# Patient Record
Sex: MALE | Race: WHITE | Employment: FULL TIME | URBAN - METROPOLITAN AREA
[De-identification: names, ages, dates, MRNs, and addresses within clinical notes are randomized per-mention and may not be internally consistent; named-entity substitution may affect disease eponyms.]

---

## 2011-02-25 LAB
ANTIBODY: NEGATIVE
HIV AG/AB: NEGATIVE

## 2017-11-17 ENCOUNTER — TELEPHONE (OUTPATIENT)
Dept: FAMILY MEDICINE CLINIC | Age: 52
End: 2017-11-17

## 2017-11-27 ENCOUNTER — TELEPHONE (OUTPATIENT)
Dept: FAMILY MEDICINE CLINIC | Age: 52
End: 2017-11-27

## 2017-11-27 DIAGNOSIS — Z12.5 SCREENING PSA (PROSTATE SPECIFIC ANTIGEN): ICD-10-CM

## 2017-11-27 DIAGNOSIS — Z13.220 SCREENING, LIPID: ICD-10-CM

## 2017-11-27 DIAGNOSIS — Z00.00 ANNUAL PHYSICAL EXAM: Primary | ICD-10-CM

## 2017-11-27 NOTE — TELEPHONE ENCOUNTER
PATIENT IS SCHEDULED TO ESTABLISH WITH DR Alfie Martinez ON 10/8/18, HE STATES HE JUST HAD A PHYSICAL WITH HER CURRENT PCP, BUT HE IS REQUESTING ORDERS FOR BLOOD WORK TO BE CREATED SO HE CAN HAVE IT DONE A WEEK BEFORE HIS APPT.  PLEASE ADVISE

## 2018-02-10 ENCOUNTER — OFFICE VISIT (OUTPATIENT)
Dept: FAMILY MEDICINE CLINIC | Age: 53
End: 2018-02-10
Payer: COMMERCIAL

## 2018-02-10 VITALS
TEMPERATURE: 98.1 F | RESPIRATION RATE: 14 BRPM | HEART RATE: 87 BPM | SYSTOLIC BLOOD PRESSURE: 152 MMHG | DIASTOLIC BLOOD PRESSURE: 96 MMHG | HEIGHT: 70 IN

## 2018-02-10 DIAGNOSIS — K64.9 HEMORRHOIDS, UNSPECIFIED HEMORRHOID TYPE: Primary | ICD-10-CM

## 2018-02-10 PROCEDURE — G8421 BMI NOT CALCULATED: HCPCS | Performed by: NURSE PRACTITIONER

## 2018-02-10 PROCEDURE — G8482 FLU IMMUNIZE ORDER/ADMIN: HCPCS | Performed by: NURSE PRACTITIONER

## 2018-02-10 PROCEDURE — 1036F TOBACCO NON-USER: CPT | Performed by: NURSE PRACTITIONER

## 2018-02-10 PROCEDURE — 3017F COLORECTAL CA SCREEN DOC REV: CPT | Performed by: NURSE PRACTITIONER

## 2018-02-10 PROCEDURE — 99213 OFFICE O/P EST LOW 20 MIN: CPT | Performed by: NURSE PRACTITIONER

## 2018-02-10 PROCEDURE — G8427 DOCREV CUR MEDS BY ELIG CLIN: HCPCS | Performed by: NURSE PRACTITIONER

## 2018-02-10 RX ORDER — HYDROCORTISONE ACETATE 25 MG/1
25 SUPPOSITORY RECTAL 2 TIMES DAILY
Qty: 100 SUPPOSITORY | Refills: 1 | Status: ON HOLD | OUTPATIENT
Start: 2018-02-10 | End: 2018-02-14 | Stop reason: HOSPADM

## 2018-02-10 RX ORDER — OMEPRAZOLE 40 MG/1
1 CAPSULE, DELAYED RELEASE ORAL
COMMUNITY
Start: 2017-07-17 | End: 2018-10-08 | Stop reason: ALTCHOICE

## 2018-02-10 ASSESSMENT — PATIENT HEALTH QUESTIONNAIRE - PHQ9
1. LITTLE INTEREST OR PLEASURE IN DOING THINGS: 0
2. FEELING DOWN, DEPRESSED OR HOPELESS: 0
SUM OF ALL RESPONSES TO PHQ9 QUESTIONS 1 & 2: 0
SUM OF ALL RESPONSES TO PHQ QUESTIONS 1-9: 0

## 2018-02-10 NOTE — PROGRESS NOTES
with the patient: current clinical status, medications, activities and diet. Side effects, adverse effects of the medication prescribed today, as well as treatment plan/ rationale and result expectations have been discussed with the patient who expresses understanding and desires to proceed. Close follow up to evaluate treatment results and for coordination of care. I have reviewed the patient's medical history in detail and updated the computerized patient record.     Amaya Lazo NP

## 2018-02-12 ENCOUNTER — OFFICE VISIT (OUTPATIENT)
Dept: FAMILY MEDICINE CLINIC | Age: 53
End: 2018-02-12
Payer: COMMERCIAL

## 2018-02-12 VITALS
HEART RATE: 82 BPM | HEIGHT: 70 IN | RESPIRATION RATE: 18 BRPM | SYSTOLIC BLOOD PRESSURE: 132 MMHG | DIASTOLIC BLOOD PRESSURE: 86 MMHG | TEMPERATURE: 97.2 F

## 2018-02-12 DIAGNOSIS — N50.89 PERINEAL MASS, MALE: Primary | ICD-10-CM

## 2018-02-12 PROCEDURE — G8421 BMI NOT CALCULATED: HCPCS | Performed by: NURSE PRACTITIONER

## 2018-02-12 PROCEDURE — G8427 DOCREV CUR MEDS BY ELIG CLIN: HCPCS | Performed by: NURSE PRACTITIONER

## 2018-02-12 PROCEDURE — 99213 OFFICE O/P EST LOW 20 MIN: CPT | Performed by: NURSE PRACTITIONER

## 2018-02-12 PROCEDURE — 3017F COLORECTAL CA SCREEN DOC REV: CPT | Performed by: NURSE PRACTITIONER

## 2018-02-12 PROCEDURE — 1036F TOBACCO NON-USER: CPT | Performed by: NURSE PRACTITIONER

## 2018-02-12 PROCEDURE — G8482 FLU IMMUNIZE ORDER/ADMIN: HCPCS | Performed by: NURSE PRACTITIONER

## 2018-02-12 ASSESSMENT — ENCOUNTER SYMPTOMS
ANAL BLEEDING: 0
DIARRHEA: 0
RECTAL PAIN: 1
NAUSEA: 0
VOMITING: 0
ABDOMINAL DISTENTION: 0
BLOOD IN STOOL: 0
CONSTIPATION: 0
ABDOMINAL PAIN: 0

## 2018-02-12 NOTE — PROGRESS NOTES
Subjective:      Patient ID: Umm Sargent is a 45 y.o. male who presents today for:  Chief Complaint   Patient presents with   [facility].     f/up on hemorroids, x5 days. denies any bleeding, has alot of pain. has tried medication OTC without relief       HPI    Patient seen for hemorrhoids 2 days ago. States he is in a lot of pain. Patient with h/o fissure that was repaired in 2012. Had colonoscopy shortly after this and states it showed nothing. Using suppositories and using otc rectal cream, tucks, etc. Using a donut to sit on. Denies any constipation or straining with BM's. Denies any penetration of anal cavity. Patient states that he feels like whatever it is causing the discomfort, it has worsened and feels like it is pressing on his tailbone. No past medical history on file. No past surgical history on file. No family history on file. Social History     Social History    Marital status: Single     Spouse name: N/A    Number of children: N/A    Years of education: N/A     Occupational History    Not on file. Social History Main Topics    Smoking status: Former Smoker    Smokeless tobacco: Never Used    Alcohol use Not on file    Drug use: Unknown    Sexual activity: Not on file     Other Topics Concern    Not on file     Social History Narrative    No narrative on file     Current Outpatient Prescriptions on File Prior to Visit   Medication Sig Dispense Refill    omeprazole (PRILOSEC) 40 MG delayed release capsule Take 1 capsule by mouth      hydrocortisone (ANUSOL-HC) 25 MG suppository Place 1 suppository rectally 2 times daily 100 suppository 1    Lidocaine, Anorectal, 5 % GEL Apply 4x a day as needed 30 g 1     No current facility-administered medications on file prior to visit. Allergies:  Amoxicillin and Clindamycin/lincomycin    Review of Systems   Gastrointestinal: Positive for rectal pain.  Negative for abdominal distention, abdominal pain, anal bleeding, blood in Return if symptoms worsen or fail to improve.     Estefanía Guevara, NP

## 2018-02-13 ENCOUNTER — ANESTHESIA EVENT (OUTPATIENT)
Dept: OPERATING ROOM | Age: 53
End: 2018-02-13
Payer: COMMERCIAL

## 2018-02-13 ENCOUNTER — HOSPITAL ENCOUNTER (OUTPATIENT)
Age: 53
Discharge: HOME | End: 2018-02-14
Attending: SURGERY | Admitting: SURGERY
Payer: COMMERCIAL

## 2018-02-13 ENCOUNTER — ANESTHESIA (OUTPATIENT)
Dept: OPERATING ROOM | Age: 53
End: 2018-02-13
Payer: COMMERCIAL

## 2018-02-13 ENCOUNTER — OFFICE VISIT (OUTPATIENT)
Dept: SURGERY | Age: 53
End: 2018-02-13
Payer: COMMERCIAL

## 2018-02-13 VITALS — DIASTOLIC BLOOD PRESSURE: 90 MMHG | TEMPERATURE: 97.9 F | SYSTOLIC BLOOD PRESSURE: 160 MMHG | HEART RATE: 100 BPM

## 2018-02-13 VITALS — DIASTOLIC BLOOD PRESSURE: 83 MMHG | SYSTOLIC BLOOD PRESSURE: 158 MMHG | OXYGEN SATURATION: 100 %

## 2018-02-13 DIAGNOSIS — K61.1 PERIRECTAL ABSCESS: Primary | ICD-10-CM

## 2018-02-13 PROBLEM — F41.1 GENERALIZED ANXIETY DISORDER: Status: ACTIVE | Noted: 2018-02-13

## 2018-02-13 PROBLEM — M27.59: Status: ACTIVE | Noted: 2018-02-13

## 2018-02-13 PROBLEM — K21.00 REFLUX ESOPHAGITIS: Status: ACTIVE | Noted: 2018-02-13

## 2018-02-13 PROBLEM — F42.9 OBSESSIVE-COMPULSIVE DISORDER: Status: ACTIVE | Noted: 2018-02-13

## 2018-02-13 PROCEDURE — 87205 SMEAR GRAM STAIN: CPT

## 2018-02-13 PROCEDURE — 2580000003 HC RX 258: Performed by: SURGERY

## 2018-02-13 PROCEDURE — 6370000000 HC RX 637 (ALT 250 FOR IP): Performed by: ANESTHESIOLOGY

## 2018-02-13 PROCEDURE — 3600000003 HC SURGERY LEVEL 3 BASE: Performed by: SURGERY

## 2018-02-13 PROCEDURE — 3700000000 HC ANESTHESIA ATTENDED CARE: Performed by: SURGERY

## 2018-02-13 PROCEDURE — 2580000003 HC RX 258: Performed by: ANESTHESIOLOGY

## 2018-02-13 PROCEDURE — 6360000002 HC RX W HCPCS: Performed by: ANESTHESIOLOGY

## 2018-02-13 PROCEDURE — G8482 FLU IMMUNIZE ORDER/ADMIN: HCPCS | Performed by: SURGERY

## 2018-02-13 PROCEDURE — 7100000000 HC PACU RECOVERY - FIRST 15 MIN: Performed by: SURGERY

## 2018-02-13 PROCEDURE — 6360000002 HC RX W HCPCS: Performed by: NURSE ANESTHETIST, CERTIFIED REGISTERED

## 2018-02-13 PROCEDURE — 7100000001 HC PACU RECOVERY - ADDTL 15 MIN: Performed by: SURGERY

## 2018-02-13 PROCEDURE — 46040 I&D ISCHIORCT&/PERIRCT ABSC: CPT | Performed by: SURGERY

## 2018-02-13 PROCEDURE — 1036F TOBACCO NON-USER: CPT | Performed by: SURGERY

## 2018-02-13 PROCEDURE — 2500000003 HC RX 250 WO HCPCS: Performed by: ANESTHESIOLOGY

## 2018-02-13 PROCEDURE — 6370000000 HC RX 637 (ALT 250 FOR IP): Performed by: SURGERY

## 2018-02-13 PROCEDURE — 6360000002 HC RX W HCPCS: Performed by: SURGERY

## 2018-02-13 PROCEDURE — G8421 BMI NOT CALCULATED: HCPCS | Performed by: SURGERY

## 2018-02-13 PROCEDURE — G8428 CUR MEDS NOT DOCUMENT: HCPCS | Performed by: SURGERY

## 2018-02-13 PROCEDURE — 87077 CULTURE AEROBIC IDENTIFY: CPT

## 2018-02-13 PROCEDURE — 87075 CULTR BACTERIA EXCEPT BLOOD: CPT

## 2018-02-13 PROCEDURE — 99204 OFFICE O/P NEW MOD 45 MIN: CPT | Performed by: SURGERY

## 2018-02-13 PROCEDURE — 87186 SC STD MICRODIL/AGAR DIL: CPT

## 2018-02-13 PROCEDURE — 2500000003 HC RX 250 WO HCPCS: Performed by: SURGERY

## 2018-02-13 PROCEDURE — 3700000001 HC ADD 15 MINUTES (ANESTHESIA): Performed by: SURGERY

## 2018-02-13 PROCEDURE — 3600000013 HC SURGERY LEVEL 3 ADDTL 15MIN: Performed by: SURGERY

## 2018-02-13 PROCEDURE — 3017F COLORECTAL CA SCREEN DOC REV: CPT | Performed by: SURGERY

## 2018-02-13 PROCEDURE — 2500000003 HC RX 250 WO HCPCS: Performed by: NURSE ANESTHETIST, CERTIFIED REGISTERED

## 2018-02-13 PROCEDURE — 87070 CULTURE OTHR SPECIMN AEROBIC: CPT

## 2018-02-13 RX ORDER — HYDROCODONE BITARTRATE AND ACETAMINOPHEN 5; 325 MG/1; MG/1
2 TABLET ORAL EVERY 4 HOURS PRN
Status: DISCONTINUED | OUTPATIENT
Start: 2018-02-13 | End: 2018-02-13

## 2018-02-13 RX ORDER — FENTANYL CITRATE 50 UG/ML
INJECTION, SOLUTION INTRAMUSCULAR; INTRAVENOUS PRN
Status: DISCONTINUED | OUTPATIENT
Start: 2018-02-13 | End: 2018-02-13 | Stop reason: SDUPTHER

## 2018-02-13 RX ORDER — ACETAMINOPHEN 325 MG/1
650 TABLET ORAL EVERY 6 HOURS PRN
COMMUNITY

## 2018-02-13 RX ORDER — SULFAMETHOXAZOLE AND TRIMETHOPRIM 800; 160 MG/1; MG/1
1 TABLET ORAL
COMMUNITY
End: 2018-10-08 | Stop reason: ALTCHOICE

## 2018-02-13 RX ORDER — HYDROCODONE BITARTRATE AND ACETAMINOPHEN 5; 325 MG/1; MG/1
1 TABLET ORAL EVERY 4 HOURS PRN
Status: DISCONTINUED | OUTPATIENT
Start: 2018-02-13 | End: 2018-02-13

## 2018-02-13 RX ORDER — M-VIT,TX,IRON,MINS/CALC/FOLIC 27MG-0.4MG
1 TABLET ORAL DAILY
COMMUNITY

## 2018-02-13 RX ORDER — ACETAMINOPHEN AND CODEINE PHOSPHATE 300; 30 MG/1; MG/1
2 TABLET ORAL EVERY 4 HOURS PRN
Status: DISCONTINUED | OUTPATIENT
Start: 2018-02-13 | End: 2018-02-14 | Stop reason: HOSPADM

## 2018-02-13 RX ORDER — MEPERIDINE HYDROCHLORIDE 25 MG/ML
12.5 INJECTION INTRAMUSCULAR; INTRAVENOUS; SUBCUTANEOUS EVERY 5 MIN PRN
Status: DISCONTINUED | OUTPATIENT
Start: 2018-02-13 | End: 2018-02-13 | Stop reason: HOSPADM

## 2018-02-13 RX ORDER — ONDANSETRON 2 MG/ML
4 INJECTION INTRAMUSCULAR; INTRAVENOUS
Status: DISCONTINUED | OUTPATIENT
Start: 2018-02-13 | End: 2018-02-13 | Stop reason: HOSPADM

## 2018-02-13 RX ORDER — DIPHENHYDRAMINE HYDROCHLORIDE 50 MG/ML
12.5 INJECTION INTRAMUSCULAR; INTRAVENOUS
Status: COMPLETED | OUTPATIENT
Start: 2018-02-13 | End: 2018-02-13

## 2018-02-13 RX ORDER — MAGNESIUM HYDROXIDE 1200 MG/15ML
LIQUID ORAL CONTINUOUS PRN
Status: DISCONTINUED | OUTPATIENT
Start: 2018-02-13 | End: 2018-02-13 | Stop reason: HOSPADM

## 2018-02-13 RX ORDER — HYDROCODONE BITARTRATE AND ACETAMINOPHEN 5; 325 MG/1; MG/1
2 TABLET ORAL PRN
Status: DISCONTINUED | OUTPATIENT
Start: 2018-02-13 | End: 2018-02-13 | Stop reason: HOSPADM

## 2018-02-13 RX ORDER — LIDOCAINE HYDROCHLORIDE 10 MG/ML
1 INJECTION, SOLUTION EPIDURAL; INFILTRATION; INTRACAUDAL; PERINEURAL
Status: COMPLETED | OUTPATIENT
Start: 2018-02-13 | End: 2018-02-13

## 2018-02-13 RX ORDER — LIDOCAINE HYDROCHLORIDE 20 MG/ML
INJECTION, SOLUTION INFILTRATION; PERINEURAL PRN
Status: DISCONTINUED | OUTPATIENT
Start: 2018-02-13 | End: 2018-02-13 | Stop reason: SDUPTHER

## 2018-02-13 RX ORDER — ONDANSETRON 2 MG/ML
4 INJECTION INTRAMUSCULAR; INTRAVENOUS EVERY 6 HOURS PRN
Status: DISCONTINUED | OUTPATIENT
Start: 2018-02-13 | End: 2018-02-14 | Stop reason: HOSPADM

## 2018-02-13 RX ORDER — METRONIDAZOLE 500 MG/1
500 TABLET ORAL 4 TIMES DAILY
COMMUNITY
End: 2018-10-08 | Stop reason: ALTCHOICE

## 2018-02-13 RX ORDER — BUPIVACAINE HYDROCHLORIDE 2.5 MG/ML
INJECTION, SOLUTION EPIDURAL; INFILTRATION; INTRACAUDAL PRN
Status: DISCONTINUED | OUTPATIENT
Start: 2018-02-13 | End: 2018-02-13 | Stop reason: HOSPADM

## 2018-02-13 RX ORDER — DOCUSATE SODIUM 100 MG/1
100 CAPSULE, LIQUID FILLED ORAL 2 TIMES DAILY
Status: ON HOLD | COMMUNITY
End: 2018-02-14 | Stop reason: HOSPADM

## 2018-02-13 RX ORDER — ACETAMINOPHEN AND CODEINE PHOSPHATE 300; 30 MG/1; MG/1
2 TABLET ORAL EVERY 4 HOURS PRN
Status: DISCONTINUED | OUTPATIENT
Start: 2018-02-13 | End: 2018-02-13

## 2018-02-13 RX ORDER — AMOXICILLIN 250 MG
2 CAPSULE ORAL DAILY
Status: ON HOLD | COMMUNITY
End: 2018-02-14 | Stop reason: HOSPADM

## 2018-02-13 RX ORDER — PSEUDOEPHEDRINE HCL 120 MG/1
120 TABLET, FILM COATED, EXTENDED RELEASE ORAL EVERY 12 HOURS PRN
Status: ON HOLD | COMMUNITY
End: 2018-02-14 | Stop reason: HOSPADM

## 2018-02-13 RX ORDER — HEPARIN SODIUM 5000 [USP'U]/.5ML
5000 INJECTION, SOLUTION INTRAVENOUS; SUBCUTANEOUS EVERY 8 HOURS
Status: DISCONTINUED | OUTPATIENT
Start: 2018-02-13 | End: 2018-02-14 | Stop reason: HOSPADM

## 2018-02-13 RX ORDER — ACETAMINOPHEN AND CODEINE PHOSPHATE 300; 30 MG/1; MG/1
1 TABLET ORAL EVERY 4 HOURS PRN
Status: DISCONTINUED | OUTPATIENT
Start: 2018-02-13 | End: 2018-02-13

## 2018-02-13 RX ORDER — SODIUM CHLORIDE 0.9 % (FLUSH) 0.9 %
10 SYRINGE (ML) INJECTION EVERY 12 HOURS SCHEDULED
Status: DISCONTINUED | OUTPATIENT
Start: 2018-02-13 | End: 2018-02-13 | Stop reason: HOSPADM

## 2018-02-13 RX ORDER — OMEGA-3 FATTY ACIDS CAP DELAYED RELEASE 1000 MG 1000 MG
1000 CAPSULE DELAYED RELEASE ORAL DAILY
COMMUNITY

## 2018-02-13 RX ORDER — KETOROLAC TROMETHAMINE 30 MG/ML
30 INJECTION, SOLUTION INTRAMUSCULAR; INTRAVENOUS EVERY 6 HOURS
Status: DISCONTINUED | OUTPATIENT
Start: 2018-02-13 | End: 2018-02-14 | Stop reason: HOSPADM

## 2018-02-13 RX ORDER — MORPHINE SULFATE 4 MG/ML
4 INJECTION, SOLUTION INTRAMUSCULAR; INTRAVENOUS
Status: DISCONTINUED | OUTPATIENT
Start: 2018-02-13 | End: 2018-02-14 | Stop reason: HOSPADM

## 2018-02-13 RX ORDER — METRONIDAZOLE 500 MG/1
250 TABLET ORAL EVERY 8 HOURS SCHEDULED
Status: DISCONTINUED | OUTPATIENT
Start: 2018-02-13 | End: 2018-02-14 | Stop reason: HOSPADM

## 2018-02-13 RX ORDER — PROPOFOL 10 MG/ML
INJECTION, EMULSION INTRAVENOUS PRN
Status: DISCONTINUED | OUTPATIENT
Start: 2018-02-13 | End: 2018-02-13 | Stop reason: SDUPTHER

## 2018-02-13 RX ORDER — HYDROCODONE BITARTRATE AND ACETAMINOPHEN 5; 325 MG/1; MG/1
1 TABLET ORAL PRN
Status: DISCONTINUED | OUTPATIENT
Start: 2018-02-13 | End: 2018-02-13 | Stop reason: HOSPADM

## 2018-02-13 RX ORDER — SODIUM CHLORIDE 0.9 % (FLUSH) 0.9 %
10 SYRINGE (ML) INJECTION PRN
Status: DISCONTINUED | OUTPATIENT
Start: 2018-02-13 | End: 2018-02-13 | Stop reason: HOSPADM

## 2018-02-13 RX ORDER — SCOLOPAMINE TRANSDERMAL SYSTEM 1 MG/1
1 PATCH, EXTENDED RELEASE TRANSDERMAL ONCE
Status: DISCONTINUED | OUTPATIENT
Start: 2018-02-13 | End: 2018-02-14 | Stop reason: HOSPADM

## 2018-02-13 RX ORDER — ACETAMINOPHEN AND CODEINE PHOSPHATE 300; 30 MG/1; MG/1
1 TABLET ORAL EVERY 4 HOURS PRN
Status: DISCONTINUED | OUTPATIENT
Start: 2018-02-13 | End: 2018-02-14 | Stop reason: HOSPADM

## 2018-02-13 RX ORDER — DEXTROSE, SODIUM CHLORIDE, SODIUM LACTATE, POTASSIUM CHLORIDE, AND CALCIUM CHLORIDE 5; .6; .31; .03; .02 G/100ML; G/100ML; G/100ML; G/100ML; G/100ML
INJECTION, SOLUTION INTRAVENOUS CONTINUOUS
Status: DISCONTINUED | OUTPATIENT
Start: 2018-02-13 | End: 2018-02-14 | Stop reason: HOSPADM

## 2018-02-13 RX ORDER — METOCLOPRAMIDE HYDROCHLORIDE 5 MG/ML
10 INJECTION INTRAMUSCULAR; INTRAVENOUS
Status: DISCONTINUED | OUTPATIENT
Start: 2018-02-13 | End: 2018-02-13 | Stop reason: HOSPADM

## 2018-02-13 RX ORDER — SODIUM CHLORIDE, SODIUM LACTATE, POTASSIUM CHLORIDE, CALCIUM CHLORIDE 600; 310; 30; 20 MG/100ML; MG/100ML; MG/100ML; MG/100ML
INJECTION, SOLUTION INTRAVENOUS CONTINUOUS
Status: DISCONTINUED | OUTPATIENT
Start: 2018-02-13 | End: 2018-02-14 | Stop reason: HOSPADM

## 2018-02-13 RX ORDER — MIDAZOLAM HYDROCHLORIDE 1 MG/ML
INJECTION INTRAMUSCULAR; INTRAVENOUS PRN
Status: DISCONTINUED | OUTPATIENT
Start: 2018-02-13 | End: 2018-02-13 | Stop reason: SDUPTHER

## 2018-02-13 RX ORDER — MORPHINE SULFATE 2 MG/ML
2 INJECTION, SOLUTION INTRAMUSCULAR; INTRAVENOUS
Status: DISCONTINUED | OUTPATIENT
Start: 2018-02-13 | End: 2018-02-14 | Stop reason: HOSPADM

## 2018-02-13 RX ORDER — FENTANYL CITRATE 50 UG/ML
50 INJECTION, SOLUTION INTRAMUSCULAR; INTRAVENOUS EVERY 10 MIN PRN
Status: DISCONTINUED | OUTPATIENT
Start: 2018-02-13 | End: 2018-02-13 | Stop reason: HOSPADM

## 2018-02-13 RX ADMIN — SODIUM CHLORIDE, SODIUM LACTATE, POTASSIUM CHLORIDE, CALCIUM CHLORIDE AND DEXTROSE MONOHYDRATE: 5; 600; 310; 30; 20 INJECTION, SOLUTION INTRAVENOUS at 22:31

## 2018-02-13 RX ADMIN — Medication 0.5 MG: at 18:00

## 2018-02-13 RX ADMIN — LIDOCAINE HYDROCHLORIDE 0.1 ML: 10 INJECTION, SOLUTION EPIDURAL; INFILTRATION; INTRACAUDAL; PERINEURAL at 15:44

## 2018-02-13 RX ADMIN — SODIUM CHLORIDE, POTASSIUM CHLORIDE, SODIUM LACTATE AND CALCIUM CHLORIDE: 600; 310; 30; 20 INJECTION, SOLUTION INTRAVENOUS at 17:05

## 2018-02-13 RX ADMIN — FENTANYL CITRATE 25 MCG: 50 INJECTION, SOLUTION INTRAMUSCULAR; INTRAVENOUS at 17:01

## 2018-02-13 RX ADMIN — PROPOFOL 50 MG: 10 INJECTION, EMULSION INTRAVENOUS at 16:59

## 2018-02-13 RX ADMIN — Medication 0.5 MG: at 18:10

## 2018-02-13 RX ADMIN — Medication 0.5 MG: at 18:20

## 2018-02-13 RX ADMIN — FENTANYL CITRATE 25 MCG: 50 INJECTION, SOLUTION INTRAMUSCULAR; INTRAVENOUS at 16:52

## 2018-02-13 RX ADMIN — FENTANYL CITRATE 25 MCG: 50 INJECTION, SOLUTION INTRAMUSCULAR; INTRAVENOUS at 16:50

## 2018-02-13 RX ADMIN — MIDAZOLAM HYDROCHLORIDE 2 MG: 1 INJECTION, SOLUTION INTRAMUSCULAR; INTRAVENOUS at 16:39

## 2018-02-13 RX ADMIN — FENTANYL CITRATE 50 MCG: 50 INJECTION, SOLUTION INTRAMUSCULAR; INTRAVENOUS at 17:40

## 2018-02-13 RX ADMIN — LIDOCAINE HYDROCHLORIDE 100 MG: 20 INJECTION, SOLUTION INFILTRATION; PERINEURAL at 16:46

## 2018-02-13 RX ADMIN — KETOROLAC TROMETHAMINE 30 MG: 30 INJECTION, SOLUTION INTRAMUSCULAR at 22:31

## 2018-02-13 RX ADMIN — Medication 0.5 MG: at 18:34

## 2018-02-13 RX ADMIN — METRONIDAZOLE 250 MG: 500 TABLET ORAL at 22:31

## 2018-02-13 RX ADMIN — SODIUM CHLORIDE, POTASSIUM CHLORIDE, SODIUM LACTATE AND CALCIUM CHLORIDE: 600; 310; 30; 20 INJECTION, SOLUTION INTRAVENOUS at 15:45

## 2018-02-13 RX ADMIN — DIPHENHYDRAMINE HYDROCHLORIDE 12.5 MG: 50 INJECTION, SOLUTION INTRAMUSCULAR; INTRAVENOUS at 18:39

## 2018-02-13 RX ADMIN — FENTANYL CITRATE 25 MCG: 50 INJECTION, SOLUTION INTRAMUSCULAR; INTRAVENOUS at 16:48

## 2018-02-13 RX ADMIN — FENTANYL CITRATE 50 MCG: 50 INJECTION, SOLUTION INTRAMUSCULAR; INTRAVENOUS at 17:51

## 2018-02-13 RX ADMIN — PROPOFOL 200 MG: 10 INJECTION, EMULSION INTRAVENOUS at 16:46

## 2018-02-13 RX ADMIN — FENTANYL CITRATE 25 MCG: 50 INJECTION, SOLUTION INTRAMUSCULAR; INTRAVENOUS at 16:54

## 2018-02-13 ASSESSMENT — PULMONARY FUNCTION TESTS
PIF_VALUE: 15
PIF_VALUE: 17
PIF_VALUE: 3
PIF_VALUE: 13
PIF_VALUE: 17
PIF_VALUE: 3
PIF_VALUE: 17
PIF_VALUE: 15
PIF_VALUE: 17
PIF_VALUE: 19
PIF_VALUE: 2
PIF_VALUE: 17
PIF_VALUE: 15
PIF_VALUE: 21
PIF_VALUE: 0
PIF_VALUE: 2
PIF_VALUE: 17
PIF_VALUE: 18
PIF_VALUE: 17
PIF_VALUE: 13
PIF_VALUE: 3
PIF_VALUE: 16
PIF_VALUE: 13
PIF_VALUE: 17
PIF_VALUE: 0
PIF_VALUE: 15
PIF_VALUE: 3
PIF_VALUE: 2

## 2018-02-13 ASSESSMENT — ENCOUNTER SYMPTOMS
ABDOMINAL DISTENTION: 0
ANAL BLEEDING: 0
ABDOMINAL PAIN: 0
TROUBLE SWALLOWING: 0
CHOKING: 0
EYE DISCHARGE: 0
EYE PAIN: 0
SHORTNESS OF BREATH: 0
WHEEZING: 0
BACK PAIN: 0
VOMITING: 0
COLOR CHANGE: 0
CHEST TIGHTNESS: 0

## 2018-02-13 ASSESSMENT — PAIN SCALES - GENERAL
PAINLEVEL_OUTOF10: 6
PAINLEVEL_OUTOF10: 5
PAINLEVEL_OUTOF10: 10
PAINLEVEL_OUTOF10: 9
PAINLEVEL_OUTOF10: 7
PAINLEVEL_OUTOF10: 8
PAINLEVEL_OUTOF10: 8

## 2018-02-13 ASSESSMENT — PAIN DESCRIPTION - PAIN TYPE: TYPE: SURGICAL PAIN

## 2018-02-13 ASSESSMENT — PAIN DESCRIPTION - LOCATION: LOCATION: BUTTOCKS

## 2018-02-13 ASSESSMENT — PAIN - FUNCTIONAL ASSESSMENT: PAIN_FUNCTIONAL_ASSESSMENT: 0-10

## 2018-02-13 NOTE — BRIEF OP NOTE
Brief Postoperative Note  ______________________________________________________________    Patient: Kip Goodpasture  YOB: 1965  MRN: [id number]  Date of Procedure: 2/13/2018    Pre-Op Diagnosis: PERIRECTAL ABSCESS    Post-Op Diagnosis: Same       Procedure(s):  INCISION AND DRAINAGE PERIRECTAL RECTAL ABSCESS (PAT ON ADMIT)    Anesthesia: General    Surgeon(s):  Radha Copeland MD    Staff:  First Assistant: Joe Kendall  Scrub Person First: Felice Goodpasture     Estimated Blood Loss: * No values recorded between 2/13/2018  4:40 PM and 2/13/2018  7:65 PM *     Complications: None    Specimens:   ID Type Source Tests Collected by Time Destination   1 :  Swab Perineum SURGICAL CULTURE Radha Copeland MD 2/13/2018 1703        Implants:  * No implants in log *      Drains:      Findings: abscess    Radha Copeland MD  Date: 2/13/2018  Time: 5:06 PM

## 2018-02-13 NOTE — ANESTHESIA PRE PROCEDURE
Department of Anesthesiology  Preprocedure Note       Name:  Jaime Garcia   Age:  45 y.o.  :  1965                                          MRN:  [id number]         Date:  2018      Surgeon: Flaca Echols):  Alfred Vickers MD    Procedure: Procedure(s):  INCISION AND DRAINAGE PERIRECTAL RECTAL ABSCESS (PAT ON ADMIT)    Medications prior to admission:   Prior to Admission medications    Medication Sig Start Date End Date Taking? Authorizing Provider   omeprazole (PRILOSEC) 40 MG delayed release capsule Take 1 capsule by mouth 17   Historical Provider, MD   hydrocortisone (ANUSOL-HC) 25 MG suppository Place 1 suppository rectally 2 times daily 2/10/18   [facility] Z[facility] Rivers   Lidocaine, Anorectal, 5 % GEL Apply 4x a day as needed 2/10/18   Gloria House NP       Current medications:    No current facility-administered medications for this encounter. 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 Past Surgical History:  No past surgical history on file. Social History:    Social History   Substance Use Topics    Smoking status: Former Smoker    Smokeless tobacco: Never Used    Alcohol use Not on file                                Counseling given: Not Answered      Vital Signs (Current): There were no vitals filed for this visit.                                            BP Readings from Last 3 Encounters:   18 (!) 160/90   18 132/86   02/10/18 (!) 152/96       NPO Status:                                                                                 BMI:   Wt Readings from Last 3 Encounters:   No data found for Wt     There is no height or weight on file to calculate BMI.    CBC: No results found for: WBC, RBC, HGB, HCT, MCV, RDW, PLT    CMP: No results found for: NA, K, CL, CO2, BUN, CREATININE, GFRAA, AGRATIO, LABGLOM, GLUCOSE, PROT, CALCIUM, BILITOT, ALKPHOS, AST, ALT    POC Tests: No results for input(s): POCGLU, POCNA, POCK, POCCL, POCBUN, POCHEMO, POCHCT in the last 72 hours. Coags: No results found for: PROTIME, INR, APTT    HCG (If Applicable): No results found for: PREGTESTUR, PREGSERUM, HCG, HCGQUANT     ABGs: No results found for: PHART, PO2ART, FZV2TIK, OHT4FQU, BEART, X5WKKWJJ     Type & Screen (If Applicable):  No results found for: LABABO, LABRH    Anesthesia Evaluation     history of anesthetic complications: PONV. Airway: Mallampati: I  TM distance: >3 FB   Neck ROM: full  Mouth opening: > = 3 FB Dental:          Pulmonary: breath sounds clear to auscultation  (+) asthma:                           ROS comment: Former smoker   Cardiovascular:Negative CV ROS            Rhythm: regular                      Neuro/Psych:   Negative Neuro/Psych ROS              GI/Hepatic/Renal:   (+) GERD:,           Endo/Other: Negative Endo/Other ROS                    Abdominal:           Vascular: negative vascular ROS. Anesthesia Plan      general     ASA 2     (PONV prophylaxis  Scop patch)  Induction: intravenous. MIPS: Postoperative opioids intended and Prophylactic antiemetics administered. Anesthetic plan and risks discussed with patient. Use of blood products discussed with patient whom consented to blood products.    Plan discussed with CRNA and surgical team.                  Екатерина Ritter MD   2/13/2018

## 2018-02-13 NOTE — PROGRESS NOTES
Kip Goodpasture      I have reviewed the Medical Assistant's entries in the Past Medical History, Medications, Allergies, Social History and Vital Sign sections      No chief complaint on file. HPI      45 M seen from ER for abscess and perirectal pain      Pt notes 8-10 days pain of anus  Notes lump at back of anus  Lump hurts    Not draining    Getting fever          Had anal surgery for fissure or fistula 2012 at [facility]  Colonoscopy at that time neg                              No past medical history on file. No past surgical history on file. Current problems include  There is no problem list on file for this patient. Social History     Social History    Marital status: Single     Spouse name: N/A    Number of children: N/A    Years of education: N/A     Social History Main Topics    Smoking status: Former Smoker    Smokeless tobacco: Never Used    Alcohol use None    Drug use: Unknown    Sexual activity: Not Asked     Other Topics Concern    None     Social History Narrative    None                 No family history on file. Current Outpatient Prescriptions   Medication Sig Dispense Refill    omeprazole (PRILOSEC) 40 MG delayed release capsule Take 1 capsule by mouth      hydrocortisone (ANUSOL-HC) 25 MG suppository Place 1 suppository rectally 2 times daily 100 suppository 1    Lidocaine, Anorectal, 5 % GEL Apply 4x a day as needed 30 g 1     No current facility-administered medications for this visit. Allergies   Allergen Reactions    Amoxicillin     Clindamycin/Lincomycin          Review of Systems   Constitutional: Negative for chills, fatigue, fever and unexpected weight change. HENT: Negative for nosebleeds and trouble swallowing. Eyes: Negative for pain and discharge. Respiratory: Negative for choking, chest tightness, shortness of breath and wheezing. Cardiovascular: Negative for chest pain, palpitations and leg swelling.    Gastrointestinal: Negative for abdominal distention, abdominal pain, anal bleeding and vomiting. Genitourinary: Negative for difficulty urinating, dysuria, flank pain and urgency. Musculoskeletal: Negative for arthralgias, back pain and neck stiffness. Skin: Negative for color change and wound. Neurological: Negative for dizziness, tremors, seizures, syncope and headaches. Hematological: Negative for adenopathy. Does not bruise/bleed easily. Psychiatric/Behavioral: Negative for agitation, behavioral problems and confusion. The patient is not nervous/anxious. Vitals:    02/13/18 0906   BP: (!) 160/90   Pulse: 100   Temp: 97.9 °F (36.6 °C)           Physical Exam   Constitutional: He is oriented to person, place, and time. He appears well-developed and well-nourished. HENT:   Head: Normocephalic and atraumatic. Eyes: Conjunctivae are normal. Pupils are equal, round, and reactive to light. Neck: Normal range of motion. Cardiovascular: Normal rate and normal heart sounds. Pulmonary/Chest: Effort normal. No stridor. He has no wheezes. He has no rales. Abdominal: Soft. Bowel sounds are normal. He exhibits no mass. There is no tenderness. There is no rebound and no guarding. Genitourinary: Right testis shows no mass. Left testis shows no mass. Genitourinary Comments: + abscess posterior to anus    Scar seen posteriorly   Musculoskeletal: Normal range of motion. Lymphadenopathy:     He has no cervical adenopathy. Neurological: He is alert and oriented to person, place, and time. Skin: Skin is warm and dry. Psychiatric: He has a normal mood and affect.  His behavior is normal.               Assessment/      Perirectal abscess            Plan/    Admit  NPO  IV ABX    To OR later today for I&D    Will have open wound    At risk for fistula            Fred Shirley MD

## 2018-02-14 VITALS
RESPIRATION RATE: 18 BRPM | DIASTOLIC BLOOD PRESSURE: 95 MMHG | WEIGHT: 195 LBS | HEART RATE: 77 BPM | TEMPERATURE: 97.5 F | SYSTOLIC BLOOD PRESSURE: 146 MMHG | HEIGHT: 70 IN | OXYGEN SATURATION: 96 % | BODY MASS INDEX: 27.92 KG/M2

## 2018-02-14 LAB
HCT VFR BLD CALC: 40 % (ref 42–52)
HEMOGLOBIN: 13.9 G/DL (ref 14–18)
MCH RBC QN AUTO: 28.4 PG (ref 27–31.3)
MCHC RBC AUTO-ENTMCNC: 34.7 % (ref 33–37)
MCV RBC AUTO: 82.1 FL (ref 80–100)
PDW BLD-RTO: 13.6 % (ref 11.5–14.5)
PLATELET # BLD: 210 K/UL (ref 130–400)
RBC # BLD: 4.87 M/UL (ref 4.7–6.1)
WBC # BLD: 6.2 K/UL (ref 4.8–10.8)

## 2018-02-14 PROCEDURE — 36415 COLL VENOUS BLD VENIPUNCTURE: CPT

## 2018-02-14 PROCEDURE — 2500000003 HC RX 250 WO HCPCS: Performed by: SURGERY

## 2018-02-14 PROCEDURE — 6370000000 HC RX 637 (ALT 250 FOR IP): Performed by: SURGERY

## 2018-02-14 PROCEDURE — 85027 COMPLETE CBC AUTOMATED: CPT

## 2018-02-14 PROCEDURE — 6360000002 HC RX W HCPCS: Performed by: SURGERY

## 2018-02-14 PROCEDURE — 2580000003 HC RX 258: Performed by: SURGERY

## 2018-02-14 PROCEDURE — 99024 POSTOP FOLLOW-UP VISIT: CPT | Performed by: SURGERY

## 2018-02-14 RX ADMIN — AZTREONAM 2 G: 2 INJECTION, SOLUTION INTRAVENOUS at 00:34

## 2018-02-14 RX ADMIN — METRONIDAZOLE 250 MG: 500 TABLET ORAL at 06:54

## 2018-02-14 RX ADMIN — AZTREONAM 2 G: 2 INJECTION, SOLUTION INTRAVENOUS at 08:16

## 2018-02-14 RX ADMIN — MORPHINE SULFATE 4 MG: 4 INJECTION, SOLUTION INTRAMUSCULAR; INTRAVENOUS at 09:14

## 2018-02-14 RX ADMIN — SODIUM CHLORIDE, SODIUM LACTATE, POTASSIUM CHLORIDE, CALCIUM CHLORIDE AND DEXTROSE MONOHYDRATE: 5; 600; 310; 30; 20 INJECTION, SOLUTION INTRAVENOUS at 08:16

## 2018-02-14 RX ADMIN — KETOROLAC TROMETHAMINE 30 MG: 30 INJECTION, SOLUTION INTRAMUSCULAR at 03:35

## 2018-02-14 ASSESSMENT — PAIN SCALES - GENERAL
PAINLEVEL_OUTOF10: 9
PAINLEVEL_OUTOF10: 1
PAINLEVEL_OUTOF10: 9

## 2018-02-14 NOTE — PROGRESS NOTES
Notified Dr. Garvey Notice via perfect serve need to review order for lovenox, and lab work. 2206 Notified Dr. Garvey Notice that patient needs to have a PLT count in order to admin heparin. Ordered am CBC. Will hold until am lab work. Pt assessment completed. Alert and oriented to person, place, time, and situation. Lung sounds clear bilaterally. RA. S1, S2 audible, no adventitious sounds. Peripheral pulses palpable. Bowel sounds active x 4 quadrants. Soft, non-tender. Skin Intact. Patient denies dyspnea, SOB, and any pain at this time. C/o tenderness to rectum. He has a bulky dressing with packing, abd pad and mesh underwear. Large drainage noted. Mesh underwear changed, applied large pad over packing. Will continue to monitor drainage.

## 2018-02-14 NOTE — OP NOTE
[address]                       [address], [address] [facility]                                 OPERATIVE REPORT    PATIENT NAME: Lucio Gray                    :        1965  MED REC NO:   [id number]                            ROOM:       [id number]  ACCOUNT NO:   [de-identified]                           ADMIT DATE: 2018  PROVIDER:     Claudia Quinn MD    DATE OF PROCEDURE:  2018    PREOPERATIVE DIAGNOSIS:  Perirectal abscess. POSTOPERATIVE DIAGNOSIS:  Perirectal abscess. OPERATION PERFORMED:  Incision and drainage of perirectal abscess. SURGEON:  Claudia Quinn MD    ASSISTANT:  Nurse. ANESTHESIA:  General.    INDICATIONS:  The patient is a 26-year-old male with a perirectal abscess. He presents now for incision and drainage. Risks, benefits, and  indications for this were reviewed with him. High likelihood of an open  wound was discussed. Need for packing, antibiotics etc., was reviewed. Potential need for colonoscopy at a later date was discussed. All  questions were answered and consent was obtained. OPERATIVE PROCEDURE:  The patient was taken to the operating room. General  anesthesia was obtained. He was placed on the table in right lateral  decubitus position. Perirectal area was now prepped with Betadine and  draped in a sterile fashion. Time-out procedures were followed. Area of concern was identified in the posterior midline. There appeared to  be an old scar at the site of prior incision and drainage procedure. Again, the area of fluctuance was centered underneath the scar. Using 15-blade, the scar was opened and abscess cavity was entered. Free  flowing white purulent fluid was evacuated, cultures were taken. The wound was irrigated and all the purulence was removed. It was now  packed with saline soaked gauze. Sterile dressing was placed.     The patient tolerated the procedure well.        Scot Flores MD    D: 02/13/2018 17:21:43       T: 02/14/2018 3:57:48     [id number]  Job#: [id number]     Doc#: [id number]    CC:  Vivienne Rodriguez MD

## 2018-02-16 LAB
ANAEROBIC CULTURE: ABNORMAL
CULTURE SURGICAL: ABNORMAL
CULTURE SURGICAL: ABNORMAL
GRAM STAIN RESULT: ABNORMAL
ORGANISM: ABNORMAL
ORGANISM: ABNORMAL

## 2018-02-16 NOTE — H&P
be kept n.p.o., will receive  fluids, analgesia, and antibiotics, will go to the operating room for  incision and drainage. Risks, benefits, and indications for this were reviewed with the patient. Anticipated convalescence was reviewed. The patient understands he will  have an open wound that may require packing. Potential for fistula requiring other treatment at a later date is  reviewed. All questions were answered, and consent was obtained.         Yfn Lobato MD    D: 02/13/2018 9:53:36       T: 02/13/2018 13:48:28     [id number]  Job#: [id number]     Doc#: [id number]    CC:  Neri Kingsley MD

## 2018-02-22 ENCOUNTER — OFFICE VISIT (OUTPATIENT)
Dept: SURGERY | Age: 53
End: 2018-02-22

## 2018-02-22 VITALS — TEMPERATURE: 97 F | HEART RATE: 74 BPM | SYSTOLIC BLOOD PRESSURE: 140 MMHG | DIASTOLIC BLOOD PRESSURE: 98 MMHG

## 2018-02-22 DIAGNOSIS — K61.1 PERIRECTAL ABSCESS: Primary | ICD-10-CM

## 2018-02-22 PROCEDURE — 99024 POSTOP FOLLOW-UP VISIT: CPT | Performed by: SURGERY

## 2018-02-22 NOTE — PROGRESS NOTES
S/P 2/13/18 I&D gustavo rectal abscess    At home  Feels great    Not draining    No f/c    PE/    I&D site closed  No eythema  No discharge  No induration    A/ doing well  P/    RTC 12/2021 for colonoscopy

## 2018-03-05 ENCOUNTER — TELEPHONE (OUTPATIENT)
Dept: SURGERY | Age: 53
End: 2018-03-05

## 2018-03-06 ENCOUNTER — OFFICE VISIT (OUTPATIENT)
Dept: SURGERY | Age: 53
End: 2018-03-06

## 2018-03-06 VITALS — TEMPERATURE: 97.5 F | SYSTOLIC BLOOD PRESSURE: 139 MMHG | HEART RATE: 101 BPM | DIASTOLIC BLOOD PRESSURE: 84 MMHG

## 2018-03-06 DIAGNOSIS — K61.1 PERIRECTAL ABSCESS: Primary | ICD-10-CM

## 2018-03-06 PROCEDURE — 99024 POSTOP FOLLOW-UP VISIT: CPT | Performed by: SURGERY

## 2018-06-26 ENCOUNTER — OFFICE VISIT (OUTPATIENT)
Dept: SURGERY | Age: 53
End: 2018-06-26
Payer: COMMERCIAL

## 2018-06-26 VITALS
SYSTOLIC BLOOD PRESSURE: 136 MMHG | HEIGHT: 70 IN | DIASTOLIC BLOOD PRESSURE: 86 MMHG | TEMPERATURE: 98 F | HEART RATE: 69 BPM | OXYGEN SATURATION: 98 %

## 2018-06-26 DIAGNOSIS — K61.1 PERIRECTAL ABSCESS: Primary | ICD-10-CM

## 2018-06-26 PROCEDURE — 99213 OFFICE O/P EST LOW 20 MIN: CPT | Performed by: SURGERY

## 2018-06-26 RX ORDER — ALBUTEROL SULFATE 90 UG/1
AEROSOL, METERED RESPIRATORY (INHALATION)
COMMUNITY
Start: 2018-03-05 | End: 2019-01-29 | Stop reason: SDUPTHER

## 2018-06-26 RX ORDER — BUDESONIDE AND FORMOTEROL FUMARATE DIHYDRATE 160; 4.5 UG/1; UG/1
AEROSOL RESPIRATORY (INHALATION)
COMMUNITY
Start: 2018-06-18 | End: 2018-10-25 | Stop reason: SDUPTHER

## 2018-06-26 ASSESSMENT — ENCOUNTER SYMPTOMS
BACK PAIN: 0
TROUBLE SWALLOWING: 0
EYE DISCHARGE: 0
ANAL BLEEDING: 0
VOMITING: 0
EYE PAIN: 0
ABDOMINAL DISTENTION: 0
CHOKING: 0
COLOR CHANGE: 0
SHORTNESS OF BREATH: 0
WHEEZING: 0
ABDOMINAL PAIN: 0
CHEST TIGHTNESS: 0

## 2018-10-01 DIAGNOSIS — Z13.220 SCREENING, LIPID: ICD-10-CM

## 2018-10-01 DIAGNOSIS — Z12.5 SCREENING PSA (PROSTATE SPECIFIC ANTIGEN): ICD-10-CM

## 2018-10-01 DIAGNOSIS — Z00.00 ANNUAL PHYSICAL EXAM: ICD-10-CM

## 2018-10-01 LAB
ALBUMIN SERPL-MCNC: 4.7 G/DL (ref 3.9–4.9)
ALP BLD-CCNC: 98 U/L (ref 35–104)
ALT SERPL-CCNC: 46 U/L (ref 0–41)
ANION GAP SERPL CALCULATED.3IONS-SCNC: 13 MEQ/L (ref 7–13)
AST SERPL-CCNC: 36 U/L (ref 0–40)
BASOPHILS ABSOLUTE: 0 K/UL (ref 0–0.2)
BASOPHILS RELATIVE PERCENT: 0.4 %
BILIRUB SERPL-MCNC: 0.6 MG/DL (ref 0–1.2)
BUN BLDV-MCNC: 16 MG/DL (ref 6–20)
CALCIUM SERPL-MCNC: 9.9 MG/DL (ref 8.6–10.2)
CHLORIDE BLD-SCNC: 101 MEQ/L (ref 98–107)
CHOLESTEROL, TOTAL: 211 MG/DL (ref 0–199)
CO2: 27 MEQ/L (ref 22–29)
CREAT SERPL-MCNC: 0.62 MG/DL (ref 0.7–1.2)
EOSINOPHILS ABSOLUTE: 0.1 K/UL (ref 0–0.7)
EOSINOPHILS RELATIVE PERCENT: 0.9 %
GFR AFRICAN AMERICAN: >60
GFR NON-AFRICAN AMERICAN: >60
GLOBULIN: 2.3 G/DL (ref 2.3–3.5)
GLUCOSE BLD-MCNC: 84 MG/DL (ref 74–109)
HCT VFR BLD CALC: 47 % (ref 42–52)
HDLC SERPL-MCNC: 61 MG/DL (ref 40–59)
HEMOGLOBIN: 16.1 G/DL (ref 14–18)
LDL CHOLESTEROL CALCULATED: 137 MG/DL (ref 0–129)
LYMPHOCYTES ABSOLUTE: 1.2 K/UL (ref 1–4.8)
LYMPHOCYTES RELATIVE PERCENT: 14.3 %
MCH RBC QN AUTO: 28.9 PG (ref 27–31.3)
MCHC RBC AUTO-ENTMCNC: 34.4 % (ref 33–37)
MCV RBC AUTO: 84 FL (ref 80–100)
MONOCYTES ABSOLUTE: 0.4 K/UL (ref 0.2–0.8)
MONOCYTES RELATIVE PERCENT: 5 %
NEUTROPHILS ABSOLUTE: 6.6 K/UL (ref 1.4–6.5)
NEUTROPHILS RELATIVE PERCENT: 79.4 %
PDW BLD-RTO: 13.3 % (ref 11.5–14.5)
PLATELET # BLD: 257 K/UL (ref 130–400)
POTASSIUM SERPL-SCNC: 4.8 MEQ/L (ref 3.5–5.1)
PROSTATE SPECIFIC ANTIGEN: 3.36 NG/ML (ref 0–3.89)
RBC # BLD: 5.59 M/UL (ref 4.7–6.1)
SODIUM BLD-SCNC: 141 MEQ/L (ref 132–144)
TOTAL PROTEIN: 7 G/DL (ref 6.4–8.1)
TRIGL SERPL-MCNC: 65 MG/DL (ref 0–200)
WBC # BLD: 8.3 K/UL (ref 4.8–10.8)

## 2018-10-08 ENCOUNTER — OFFICE VISIT (OUTPATIENT)
Dept: FAMILY MEDICINE CLINIC | Age: 53
End: 2018-10-08
Payer: COMMERCIAL

## 2018-10-08 VITALS
HEIGHT: 70 IN | BODY MASS INDEX: 27.98 KG/M2 | TEMPERATURE: 97.9 F | DIASTOLIC BLOOD PRESSURE: 82 MMHG | SYSTOLIC BLOOD PRESSURE: 134 MMHG | HEART RATE: 76 BPM | RESPIRATION RATE: 16 BRPM

## 2018-10-08 DIAGNOSIS — Z23 NEED FOR TETANUS, DIPHTHERIA, AND ACELLULAR PERTUSSIS (TDAP) VACCINE IN PATIENT OF ADOLESCENT AGE OR OLDER: ICD-10-CM

## 2018-10-08 DIAGNOSIS — R97.20 INCREASED PROSTATE SPECIFIC ANTIGEN (PSA) VELOCITY: ICD-10-CM

## 2018-10-08 DIAGNOSIS — Z00.00 ANNUAL PHYSICAL EXAM: Primary | ICD-10-CM

## 2018-10-08 DIAGNOSIS — E78.2 MIXED HYPERLIPIDEMIA: ICD-10-CM

## 2018-10-08 DIAGNOSIS — Z23 NEED FOR INFLUENZA VACCINATION: ICD-10-CM

## 2018-10-08 PROCEDURE — 90688 IIV4 VACCINE SPLT 0.5 ML IM: CPT | Performed by: FAMILY MEDICINE

## 2018-10-08 PROCEDURE — 99204 OFFICE O/P NEW MOD 45 MIN: CPT | Performed by: FAMILY MEDICINE

## 2018-10-08 PROCEDURE — 90471 IMMUNIZATION ADMIN: CPT | Performed by: FAMILY MEDICINE

## 2018-10-08 PROCEDURE — 90715 TDAP VACCINE 7 YRS/> IM: CPT | Performed by: FAMILY MEDICINE

## 2018-10-08 PROCEDURE — 90472 IMMUNIZATION ADMIN EACH ADD: CPT | Performed by: FAMILY MEDICINE

## 2018-10-08 RX ORDER — TRIAMCINOLONE ACETONIDE 1 MG/G
CREAM TOPICAL 2 TIMES DAILY
COMMUNITY
End: 2018-12-20 | Stop reason: SDUPTHER

## 2018-10-08 RX ORDER — FLUTICASONE PROPIONATE 50 MCG
1 SPRAY, SUSPENSION (ML) NASAL DAILY
COMMUNITY
End: 2019-01-29 | Stop reason: SDUPTHER

## 2018-10-08 RX ORDER — PANTOPRAZOLE SODIUM 40 MG/1
40 TABLET, DELAYED RELEASE ORAL DAILY
Qty: 90 TABLET | Refills: 1 | Status: SHIPPED | OUTPATIENT
Start: 2018-10-08 | End: 2019-02-04 | Stop reason: SDUPTHER

## 2018-10-08 RX ORDER — CETIRIZINE HYDROCHLORIDE 10 MG/1
10 TABLET ORAL DAILY
COMMUNITY
Start: 2013-08-23

## 2018-10-08 ASSESSMENT — ENCOUNTER SYMPTOMS
VOICE CHANGE: 0
RHINORRHEA: 0
VOMITING: 0
DIARRHEA: 0
ABDOMINAL PAIN: 0
SORE THROAT: 0
NAUSEA: 0
CHEST TIGHTNESS: 0
COLOR CHANGE: 0
COUGH: 0
TROUBLE SWALLOWING: 0
SHORTNESS OF BREATH: 0
CONSTIPATION: 0
WHEEZING: 0
BLOOD IN STOOL: 0
SINUS PAIN: 0

## 2018-10-15 ENCOUNTER — TELEPHONE (OUTPATIENT)
Dept: FAMILY MEDICINE CLINIC | Age: 53
End: 2018-10-15

## 2018-10-15 DIAGNOSIS — R74.01 ELEVATED ALT MEASUREMENT: Primary | ICD-10-CM

## 2018-10-15 NOTE — TELEPHONE ENCOUNTER
Patient's lab from 10/1/18 had an elevated ALT. Please advise on an additional lab(s) and when to complete. thank you.

## 2018-10-17 ENCOUNTER — TELEPHONE (OUTPATIENT)
Dept: FAMILY MEDICINE CLINIC | Age: 53
End: 2018-10-17

## 2018-10-18 ENCOUNTER — TELEPHONE (OUTPATIENT)
Dept: SURGERY | Age: 53
End: 2018-10-18

## 2018-10-25 RX ORDER — BUDESONIDE AND FORMOTEROL FUMARATE DIHYDRATE 160; 4.5 UG/1; UG/1
2 AEROSOL RESPIRATORY (INHALATION) 2 TIMES DAILY
Qty: 1 INHALER | Refills: 5 | Status: SHIPPED | OUTPATIENT
Start: 2018-10-25 | End: 2019-01-29 | Stop reason: SDUPTHER

## 2018-12-20 RX ORDER — TRIAMCINOLONE ACETONIDE 1 MG/G
CREAM TOPICAL 2 TIMES DAILY
Qty: 80 G | Refills: 1 | Status: SHIPPED | OUTPATIENT
Start: 2018-12-20 | End: 2019-01-29 | Stop reason: SDUPTHER

## 2019-01-29 RX ORDER — TRIAMCINOLONE ACETONIDE 1 MG/G
CREAM TOPICAL
Qty: 80 G | Refills: 1 | Status: SHIPPED | OUTPATIENT
Start: 2019-01-29 | End: 2019-02-04 | Stop reason: SDUPTHER

## 2019-01-29 RX ORDER — ALBUTEROL SULFATE 90 UG/1
AEROSOL, METERED RESPIRATORY (INHALATION)
Qty: 1 INHALER | Refills: 5 | Status: SHIPPED | OUTPATIENT
Start: 2019-01-29 | End: 2019-02-04 | Stop reason: SDUPTHER

## 2019-01-29 RX ORDER — FLUTICASONE PROPIONATE 50 MCG
1 SPRAY, SUSPENSION (ML) NASAL DAILY
Qty: 1 BOTTLE | Refills: 5 | Status: SHIPPED | OUTPATIENT
Start: 2019-01-29 | End: 2019-01-30 | Stop reason: CLARIF

## 2019-01-29 RX ORDER — BUDESONIDE AND FORMOTEROL FUMARATE DIHYDRATE 160; 4.5 UG/1; UG/1
2 AEROSOL RESPIRATORY (INHALATION) 2 TIMES DAILY
Qty: 1 INHALER | Refills: 5 | Status: SHIPPED | OUTPATIENT
Start: 2019-01-29 | End: 2019-02-04 | Stop reason: SDUPTHER

## 2019-01-30 RX ORDER — FLUTICASONE PROPIONATE 50 MCG
1 SPRAY, SUSPENSION (ML) NASAL DAILY
Qty: 1 BOTTLE | Refills: 5 | Status: SHIPPED | OUTPATIENT
Start: 2019-01-30 | End: 2019-02-04 | Stop reason: SDUPTHER

## 2019-02-04 ENCOUNTER — TELEPHONE (OUTPATIENT)
Dept: FAMILY MEDICINE CLINIC | Age: 54
End: 2019-02-04

## 2019-02-04 DIAGNOSIS — J30.1 ALLERGIC RHINITIS DUE TO POLLEN, UNSPECIFIED SEASONALITY: Primary | ICD-10-CM

## 2019-02-04 DIAGNOSIS — K21.00 REFLUX ESOPHAGITIS: ICD-10-CM

## 2019-02-04 DIAGNOSIS — L30.9 DERMATITIS: ICD-10-CM

## 2019-02-04 DIAGNOSIS — J45.40 MODERATE PERSISTENT ASTHMA, UNSPECIFIED WHETHER COMPLICATED: ICD-10-CM

## 2019-02-04 RX ORDER — BUDESONIDE AND FORMOTEROL FUMARATE DIHYDRATE 160; 4.5 UG/1; UG/1
2 AEROSOL RESPIRATORY (INHALATION) 2 TIMES DAILY
Qty: 3 INHALER | Refills: 3 | Status: SHIPPED | OUTPATIENT
Start: 2019-02-04 | End: 2019-02-07 | Stop reason: SDUPTHER

## 2019-02-04 RX ORDER — FLUTICASONE PROPIONATE 50 MCG
1 SPRAY, SUSPENSION (ML) NASAL DAILY
Qty: 3 BOTTLE | Refills: 3 | Status: SHIPPED | OUTPATIENT
Start: 2019-02-04

## 2019-02-04 RX ORDER — TRIAMCINOLONE ACETONIDE 1 MG/G
CREAM TOPICAL
Qty: 240 G | Refills: 3 | Status: SHIPPED | OUTPATIENT
Start: 2019-02-04 | End: 2020-03-13 | Stop reason: SDUPTHER

## 2019-02-04 RX ORDER — FLUTICASONE PROPIONATE 50 MCG
1 SPRAY, SUSPENSION (ML) NASAL DAILY
Qty: 3 BOTTLE | Refills: 3 | Status: SHIPPED | OUTPATIENT
Start: 2019-02-04 | End: 2019-02-04 | Stop reason: SDUPTHER

## 2019-02-04 RX ORDER — ALBUTEROL SULFATE 90 UG/1
AEROSOL, METERED RESPIRATORY (INHALATION)
Qty: 3 INHALER | Refills: 3 | Status: SHIPPED | OUTPATIENT
Start: 2019-02-04 | End: 2019-02-04 | Stop reason: SDUPTHER

## 2019-02-04 RX ORDER — BUDESONIDE AND FORMOTEROL FUMARATE DIHYDRATE 160; 4.5 UG/1; UG/1
2 AEROSOL RESPIRATORY (INHALATION) 2 TIMES DAILY
Qty: 3 INHALER | Refills: 3 | Status: SHIPPED | OUTPATIENT
Start: 2019-02-04 | End: 2019-02-04 | Stop reason: SDUPTHER

## 2019-02-04 RX ORDER — PANTOPRAZOLE SODIUM 40 MG/1
40 TABLET, DELAYED RELEASE ORAL DAILY
Qty: 90 TABLET | Refills: 3 | Status: SHIPPED | OUTPATIENT
Start: 2019-02-04 | End: 2019-02-04 | Stop reason: SDUPTHER

## 2019-02-04 RX ORDER — ALBUTEROL SULFATE 90 UG/1
AEROSOL, METERED RESPIRATORY (INHALATION)
Qty: 3 INHALER | Refills: 3 | Status: SHIPPED | OUTPATIENT
Start: 2019-02-04 | End: 2021-07-12

## 2019-02-04 RX ORDER — PANTOPRAZOLE SODIUM 40 MG/1
40 TABLET, DELAYED RELEASE ORAL DAILY
Qty: 90 TABLET | Refills: 3 | Status: SHIPPED | OUTPATIENT
Start: 2019-02-04 | End: 2019-12-02 | Stop reason: SDUPTHER

## 2019-02-04 RX ORDER — TRIAMCINOLONE ACETONIDE 1 MG/G
CREAM TOPICAL
Qty: 240 G | Refills: 3 | Status: SHIPPED | OUTPATIENT
Start: 2019-02-04 | End: 2019-02-04 | Stop reason: SDUPTHER

## 2019-02-07 DIAGNOSIS — J45.40 MODERATE PERSISTENT ASTHMA, UNSPECIFIED WHETHER COMPLICATED: ICD-10-CM

## 2019-02-07 RX ORDER — BUDESONIDE AND FORMOTEROL FUMARATE DIHYDRATE 160; 4.5 UG/1; UG/1
2 AEROSOL RESPIRATORY (INHALATION) 2 TIMES DAILY
Qty: 3 INHALER | Refills: 3 | Status: SHIPPED | OUTPATIENT
Start: 2019-02-07 | End: 2019-02-07 | Stop reason: SDUPTHER

## 2019-02-07 RX ORDER — BUDESONIDE AND FORMOTEROL FUMARATE DIHYDRATE 160; 4.5 UG/1; UG/1
2 AEROSOL RESPIRATORY (INHALATION) 2 TIMES DAILY
Qty: 3 INHALER | Refills: 3 | Status: CANCELLED | OUTPATIENT
Start: 2019-02-07

## 2019-02-07 RX ORDER — BUDESONIDE AND FORMOTEROL FUMARATE DIHYDRATE 160; 4.5 UG/1; UG/1
2 AEROSOL RESPIRATORY (INHALATION) 2 TIMES DAILY
Qty: 3 INHALER | Refills: 3 | Status: SHIPPED | OUTPATIENT
Start: 2019-02-07 | End: 2020-09-14

## 2019-02-25 DIAGNOSIS — E78.2 MIXED HYPERLIPIDEMIA: Primary | ICD-10-CM

## 2019-02-25 DIAGNOSIS — R74.01 ELEVATED ALT MEASUREMENT: ICD-10-CM

## 2019-02-25 DIAGNOSIS — R97.20 INCREASING PROSTATE SPECIFIC ANTIGEN LEVEL: ICD-10-CM

## 2019-02-26 ENCOUNTER — TELEPHONE (OUTPATIENT)
Dept: FAMILY MEDICINE CLINIC | Age: 54
End: 2019-02-26

## 2019-04-01 ENCOUNTER — TELEPHONE (OUTPATIENT)
Dept: FAMILY MEDICINE CLINIC | Age: 54
End: 2019-04-01

## 2019-04-01 NOTE — TELEPHONE ENCOUNTER
Pt has appt in Sept for lab and Oct to transfer care to Dr. Ines Bassett. He is requesting orders for all his labs to be drawn, that he normally gets. He is requesting cholesterol, liver, sugar, sodium, psa, etc. He wants everything checked.

## 2019-04-02 DIAGNOSIS — Z12.5 PROSTATE CANCER SCREENING: ICD-10-CM

## 2019-04-02 DIAGNOSIS — E78.00 PURE HYPERCHOLESTEROLEMIA: ICD-10-CM

## 2019-04-02 DIAGNOSIS — R73.9 HYPERGLYCEMIA: ICD-10-CM

## 2019-04-02 DIAGNOSIS — Z00.00 PREVENTATIVE HEALTH CARE: Primary | ICD-10-CM

## 2019-04-19 ENCOUNTER — OFFICE VISIT (OUTPATIENT)
Dept: SURGERY | Age: 54
End: 2019-04-19
Payer: COMMERCIAL

## 2019-04-19 VITALS — HEIGHT: 70 IN | BODY MASS INDEX: 27.98 KG/M2 | HEART RATE: 92 BPM | OXYGEN SATURATION: 97 % | TEMPERATURE: 97.3 F

## 2019-04-19 DIAGNOSIS — K64.2 GRADE III HEMORRHOIDS: Primary | ICD-10-CM

## 2019-04-19 PROCEDURE — 46221 LIGATION OF HEMORRHOID(S): CPT | Performed by: COLON & RECTAL SURGERY

## 2019-04-19 PROCEDURE — 99203 OFFICE O/P NEW LOW 30 MIN: CPT | Performed by: COLON & RECTAL SURGERY

## 2019-04-19 ASSESSMENT — ENCOUNTER SYMPTOMS
CHEST TIGHTNESS: 0
ABDOMINAL DISTENTION: 0
VOMITING: 0
BLOOD IN STOOL: 0
COUGH: 0
RECTAL PAIN: 1
ANAL BLEEDING: 0
COLOR CHANGE: 0

## 2019-04-19 NOTE — PROGRESS NOTES
service: Not on file     Active member of club or organization: Not on file     Attends meetings of clubs or organizations: Not on file     Relationship status: Not on file    Intimate partner violence:     Fear of current or ex partner: Not on file     Emotionally abused: Not on file     Physically abused: Not on file     Forced sexual activity: Not on file   Other Topics Concern    Not on file   Social History Narrative    Not on file     History reviewed. No pertinent family history. Allergies:  Amoxicillin and Clindamycin/lincomycin    Review of Systems   Constitutional: Negative for activity change, appetite change, diaphoresis and fever. Respiratory: Negative for cough and chest tightness. Cardiovascular: Negative for chest pain and palpitations. Gastrointestinal: Positive for rectal pain. Negative for abdominal distention, anal bleeding, blood in stool and vomiting. Genitourinary: Negative for difficulty urinating. Musculoskeletal: Negative for arthralgias. Skin: Negative for color change. Neurological: Negative for dizziness and headaches. Psychiatric/Behavioral: Negative for agitation. Objective:    Pulse 92   Temp 97.3 °F (36.3 °C) (Temporal)   Ht 5' 10\" (1.778 m)   SpO2 97%   BMI 27.98 kg/m²     Physical Exam   Constitutional: He is oriented to person, place, and time. He appears well-developed and well-nourished. No distress. HENT:   Head: Normocephalic and atraumatic. Eyes: Pupils are equal, round, and reactive to light. Neck: Normal range of motion. Cardiovascular: Normal rate, regular rhythm and normal heart sounds. Pulmonary/Chest: Effort normal and breath sounds normal. No respiratory distress. He has no wheezes. Abdominal: Soft. He exhibits no distension. There is no tenderness. There is no guarding. No hernia. Genitourinary:   Genitourinary Comments: Anal inspection shows a prolapsing grade 3 internal hemorrhoid in the left lateral position.   There is no evidence of any perianal abscess. There is no evidence of a fistula. Digital exam shows no evidence of an anal fissure. Anoscopy was performed which was only remarkable for grade 3 internal hemorrhoid left lateral position. Musculoskeletal: Normal range of motion. He exhibits no edema or tenderness. Neurological: He is alert and oriented to person, place, and time. Skin: Skin is warm and dry. No rash noted. He is not diaphoretic. No erythema. Psychiatric: He has a normal mood and affect. His behavior is normal. Judgment and thought content normal.     Procedure: I discussed with the patient the risks and benefits of hemorrhoid banding. Risks of infection bleeding and recurrence of hemorrhoids were all addressed. Postoperative pain post-banding was discussed as well. I discussed the risks and benefits, and the patient wishes to proceed. Proper consents were obtained. Timeout was taken for appropriate verification    With the patient in left lateral position a lubricated anoscope was inserted without difficulty. The above-mentioned hemorrhoids were located in the Left lateral after the hemorrhoids were grasped to ensure that they were insensate. Once appropriate, the hemorrhoid bands were deployed. Excellent results were obtained. The anoscope was removed. Patient tolerated procedure without difficulty. There was no blood loss during the procedure. Assessment/Plan:          Diagnosis Orders   1. Grade III hemorrhoids        Post-banding instructions were given. He had a number of questions regarding his abdominal discomfort around his umbilical site as well as the post-banding clinical course for internal hemorrhoids. I discussed this with him for 20 minutes and answered all his questions. I will see him back in the office in 3-4 weeks if any problems persist.  Reassurance was given.             Please note this report has beenpartially produced using speech recognition software and may cause contain errors related to that system including grammar, punctuation and spelling as well as words and phrases that may seem inappropriate.  If there arequestions or concerns please feel free to contact me to clarify

## 2019-10-03 DIAGNOSIS — E78.00 PURE HYPERCHOLESTEROLEMIA: ICD-10-CM

## 2019-10-03 DIAGNOSIS — R73.9 HYPERGLYCEMIA: ICD-10-CM

## 2019-10-03 DIAGNOSIS — Z00.00 PREVENTATIVE HEALTH CARE: ICD-10-CM

## 2019-10-03 DIAGNOSIS — Z12.5 PROSTATE CANCER SCREENING: ICD-10-CM

## 2019-10-03 LAB
ALBUMIN SERPL-MCNC: 4.8 G/DL (ref 3.5–4.6)
ALP BLD-CCNC: 101 U/L (ref 35–104)
ALT SERPL-CCNC: 40 U/L (ref 0–41)
ANION GAP SERPL CALCULATED.3IONS-SCNC: 15 MEQ/L (ref 9–15)
AST SERPL-CCNC: 39 U/L (ref 0–40)
BASOPHILS ABSOLUTE: 0 K/UL (ref 0–0.2)
BASOPHILS RELATIVE PERCENT: 0.6 %
BILIRUB SERPL-MCNC: 0.6 MG/DL (ref 0.2–0.7)
BUN BLDV-MCNC: 15 MG/DL (ref 6–20)
CALCIUM SERPL-MCNC: 9.8 MG/DL (ref 8.5–9.9)
CHLORIDE BLD-SCNC: 105 MEQ/L (ref 95–107)
CHOLESTEROL, TOTAL: 192 MG/DL (ref 0–199)
CO2: 24 MEQ/L (ref 20–31)
CREAT SERPL-MCNC: 0.83 MG/DL (ref 0.7–1.2)
EOSINOPHILS ABSOLUTE: 0.2 K/UL (ref 0–0.7)
EOSINOPHILS RELATIVE PERCENT: 3.4 %
GFR AFRICAN AMERICAN: >60
GFR NON-AFRICAN AMERICAN: >60
GLOBULIN: 2.5 G/DL (ref 2.3–3.5)
GLUCOSE BLD-MCNC: 102 MG/DL (ref 70–99)
HBA1C MFR BLD: 5.5 % (ref 4.8–5.9)
HCT VFR BLD CALC: 49.8 % (ref 42–52)
HDLC SERPL-MCNC: 67 MG/DL (ref 40–59)
HEMOGLOBIN: 16.6 G/DL (ref 14–18)
LDL CHOLESTEROL CALCULATED: 109 MG/DL (ref 0–129)
LYMPHOCYTES ABSOLUTE: 1.4 K/UL (ref 1–4.8)
LYMPHOCYTES RELATIVE PERCENT: 24.6 %
MCH RBC QN AUTO: 28.6 PG (ref 27–31.3)
MCHC RBC AUTO-ENTMCNC: 33.3 % (ref 33–37)
MCV RBC AUTO: 85.7 FL (ref 80–100)
MONOCYTES ABSOLUTE: 0.5 K/UL (ref 0.2–0.8)
MONOCYTES RELATIVE PERCENT: 7.8 %
NEUTROPHILS ABSOLUTE: 3.7 K/UL (ref 1.4–6.5)
NEUTROPHILS RELATIVE PERCENT: 63.6 %
PDW BLD-RTO: 13.7 % (ref 11.5–14.5)
PLATELET # BLD: 271 K/UL (ref 130–400)
POTASSIUM SERPL-SCNC: 4.4 MEQ/L (ref 3.4–4.9)
PROSTATE SPECIFIC ANTIGEN: 2.7 NG/ML (ref 0–3.89)
RBC # BLD: 5.81 M/UL (ref 4.7–6.1)
SODIUM BLD-SCNC: 144 MEQ/L (ref 135–144)
TOTAL PROTEIN: 7.3 G/DL (ref 6.3–8)
TRIGL SERPL-MCNC: 82 MG/DL (ref 0–150)
WBC # BLD: 5.8 K/UL (ref 4.8–10.8)

## 2019-10-07 ENCOUNTER — OFFICE VISIT (OUTPATIENT)
Dept: FAMILY MEDICINE CLINIC | Age: 54
End: 2019-10-07
Payer: COMMERCIAL

## 2019-10-07 VITALS
BODY MASS INDEX: 27.98 KG/M2 | TEMPERATURE: 98 F | DIASTOLIC BLOOD PRESSURE: 90 MMHG | HEART RATE: 108 BPM | RESPIRATION RATE: 14 BRPM | HEIGHT: 70 IN | SYSTOLIC BLOOD PRESSURE: 150 MMHG | OXYGEN SATURATION: 98 %

## 2019-10-07 DIAGNOSIS — Z00.00 PREVENTATIVE HEALTH CARE: Primary | ICD-10-CM

## 2019-10-07 DIAGNOSIS — K64.4 EXTERNAL HEMORRHOIDS: ICD-10-CM

## 2019-10-07 PROCEDURE — 99396 PREV VISIT EST AGE 40-64: CPT | Performed by: INTERNAL MEDICINE

## 2019-10-07 PROCEDURE — 90688 IIV4 VACCINE SPLT 0.5 ML IM: CPT | Performed by: INTERNAL MEDICINE

## 2019-10-07 PROCEDURE — 90471 IMMUNIZATION ADMIN: CPT | Performed by: INTERNAL MEDICINE

## 2019-10-07 RX ORDER — METHYLPREDNISOLONE 4 MG/1
TABLET ORAL
COMMUNITY
Start: 2018-03-15 | End: 2020-09-14

## 2019-10-07 RX ORDER — HYDROCORTISONE ACETATE 25 MG/1
25 SUPPOSITORY RECTAL EVERY 12 HOURS
Qty: 12 SUPPOSITORY | Refills: 5 | Status: SHIPPED | OUTPATIENT
Start: 2019-10-07 | End: 2020-08-24

## 2019-10-13 ASSESSMENT — ENCOUNTER SYMPTOMS
VOMITING: 0
TROUBLE SWALLOWING: 0
SHORTNESS OF BREATH: 0
NAUSEA: 0
VOICE CHANGE: 0
PHOTOPHOBIA: 0
CHOKING: 0

## 2019-12-02 ENCOUNTER — OFFICE VISIT (OUTPATIENT)
Dept: PRIMARY CARE CLINIC | Age: 54
End: 2019-12-02
Payer: COMMERCIAL

## 2019-12-02 VITALS
TEMPERATURE: 96.7 F | RESPIRATION RATE: 18 BRPM | OXYGEN SATURATION: 99 % | SYSTOLIC BLOOD PRESSURE: 140 MMHG | HEIGHT: 71 IN | WEIGHT: 195 LBS | BODY MASS INDEX: 27.3 KG/M2 | HEART RATE: 80 BPM | DIASTOLIC BLOOD PRESSURE: 98 MMHG

## 2019-12-02 DIAGNOSIS — M79.641 BILATERAL HAND PAIN: ICD-10-CM

## 2019-12-02 DIAGNOSIS — M79.642 BILATERAL HAND PAIN: ICD-10-CM

## 2019-12-02 DIAGNOSIS — Z76.0 MEDICATION REFILL: ICD-10-CM

## 2019-12-02 DIAGNOSIS — K21.00 REFLUX ESOPHAGITIS: Primary | ICD-10-CM

## 2019-12-02 PROCEDURE — 99213 OFFICE O/P EST LOW 20 MIN: CPT | Performed by: NURSE PRACTITIONER

## 2019-12-02 RX ORDER — PANTOPRAZOLE SODIUM 40 MG/1
40 TABLET, DELAYED RELEASE ORAL DAILY
Qty: 90 TABLET | Refills: 3 | Status: SHIPPED | OUTPATIENT
Start: 2019-12-02 | End: 2020-11-20 | Stop reason: SDUPTHER

## 2019-12-27 ASSESSMENT — ENCOUNTER SYMPTOMS
ANAL BLEEDING: 0
BLOOD IN STOOL: 0
CONSTIPATION: 0
ABDOMINAL PAIN: 0
COUGH: 0
VOMITING: 0
NAUSEA: 0
DIARRHEA: 0
ABDOMINAL DISTENTION: 0
WHEEZING: 0
SHORTNESS OF BREATH: 0
CHEST TIGHTNESS: 0

## 2020-03-13 ENCOUNTER — TELEPHONE (OUTPATIENT)
Dept: PRIMARY CARE CLINIC | Age: 55
End: 2020-03-13

## 2020-03-13 RX ORDER — ALBUTEROL SULFATE 90 UG/1
AEROSOL, METERED RESPIRATORY (INHALATION)
Qty: 3 INHALER | Refills: 1 | Status: SHIPPED | OUTPATIENT
Start: 2020-03-13 | End: 2020-03-16 | Stop reason: SDUPTHER

## 2020-03-13 RX ORDER — TRIAMCINOLONE ACETONIDE 1 MG/G
CREAM TOPICAL
Qty: 240 G | Refills: 3 | Status: SHIPPED | OUTPATIENT
Start: 2020-03-13 | End: 2020-03-16 | Stop reason: SDUPTHER

## 2020-03-13 RX ORDER — TRIAMCINOLONE ACETONIDE 1 MG/G
CREAM TOPICAL
Qty: 240 G | Refills: 3 | Status: CANCELLED | OUTPATIENT
Start: 2020-03-13

## 2020-03-13 NOTE — TELEPHONE ENCOUNTER
Patient would like refills for 90 days with 3 refills attatched for insurance purposes and because of mail order needs GENERIC for PROAIR INHALER, and MUPIROCIN ointment 2% net wt 22grams, AND TRIAMCINOLONE 0.1%

## 2020-03-16 RX ORDER — TRIAMCINOLONE ACETONIDE 1 MG/G
CREAM TOPICAL
Qty: 240 G | Refills: 3 | Status: SHIPPED | OUTPATIENT
Start: 2020-03-16 | End: 2021-08-23 | Stop reason: SDUPTHER

## 2020-03-16 RX ORDER — ALBUTEROL SULFATE 90 UG/1
AEROSOL, METERED RESPIRATORY (INHALATION)
Qty: 3 INHALER | Refills: 3 | Status: SHIPPED | OUTPATIENT
Start: 2020-03-16 | End: 2020-07-20 | Stop reason: SDUPTHER

## 2020-03-18 RX ORDER — TRIAMCINOLONE ACETONIDE 1 MG/G
CREAM TOPICAL
Qty: 240 G | Refills: 3 | OUTPATIENT
Start: 2020-03-18

## 2020-07-15 ENCOUNTER — TELEPHONE (OUTPATIENT)
Dept: ADMINISTRATIVE | Age: 55
End: 2020-07-15

## 2020-07-15 NOTE — TELEPHONE ENCOUNTER
Patient called for a refill of his Albuterol sulfate HFA (PROAIR  mgc/act  inhaler sent to [facility] for a 90 day supply with three refills.

## 2020-07-17 NOTE — TELEPHONE ENCOUNTER
Patient called for a refill of his Albuterol sulfate HFA (PROAIR  mgc/act  inhaler sent to [facility] for a 90 day supply with three refills.

## 2020-07-20 RX ORDER — ALBUTEROL SULFATE 90 UG/1
AEROSOL, METERED RESPIRATORY (INHALATION)
Qty: 3 INHALER | Refills: 3 | Status: SHIPPED | OUTPATIENT
Start: 2020-07-20 | End: 2020-09-14

## 2020-08-07 ENCOUNTER — TELEPHONE (OUTPATIENT)
Dept: PRIMARY CARE CLINIC | Age: 55
End: 2020-08-07

## 2020-08-07 NOTE — TELEPHONE ENCOUNTER
Received: 1 week ago   [address] Clinical Staff    Caller: Unspecified (1 week ago,  3:07 PM)               The Pt has requested that Dr Jerilyn Gonzalez approve his needed Lab Testing Scripts before his Mon. 8/4/2020 appt. He is planning on coming to the [facility]. 8/17/2020 for his blood draws. He wants a Complete series of Lab Testing done, including but not limited to PSA, Cholesterol, Prostrate.

## 2020-08-17 DIAGNOSIS — Z00.00 PREVENTATIVE HEALTH CARE: ICD-10-CM

## 2020-08-17 DIAGNOSIS — E78.00 PURE HYPERCHOLESTEROLEMIA: ICD-10-CM

## 2020-08-17 DIAGNOSIS — R73.9 HYPERGLYCEMIA: ICD-10-CM

## 2020-08-17 DIAGNOSIS — Z12.5 PROSTATE CANCER SCREENING: ICD-10-CM

## 2020-08-17 LAB
ALBUMIN SERPL-MCNC: 4.8 G/DL (ref 3.5–4.6)
ALP BLD-CCNC: 94 U/L (ref 35–104)
ALT SERPL-CCNC: 37 U/L (ref 0–41)
ANION GAP SERPL CALCULATED.3IONS-SCNC: 9 MEQ/L (ref 9–15)
AST SERPL-CCNC: 36 U/L (ref 0–40)
BASOPHILS ABSOLUTE: 0 K/UL (ref 0–0.2)
BASOPHILS RELATIVE PERCENT: 0.5 %
BILIRUB SERPL-MCNC: 0.6 MG/DL (ref 0.2–0.7)
BUN BLDV-MCNC: 14 MG/DL (ref 6–20)
CALCIUM SERPL-MCNC: 9.6 MG/DL (ref 8.5–9.9)
CHLORIDE BLD-SCNC: 105 MEQ/L (ref 95–107)
CHOLESTEROL, TOTAL: 223 MG/DL (ref 0–199)
CO2: 30 MEQ/L (ref 20–31)
CREAT SERPL-MCNC: 0.68 MG/DL (ref 0.7–1.2)
EOSINOPHILS ABSOLUTE: 0.2 K/UL (ref 0–0.7)
EOSINOPHILS RELATIVE PERCENT: 4.1 %
GFR AFRICAN AMERICAN: >60
GFR NON-AFRICAN AMERICAN: >60
GLOBULIN: 2.4 G/DL (ref 2.3–3.5)
GLUCOSE BLD-MCNC: 105 MG/DL (ref 70–99)
HCT VFR BLD CALC: 49 % (ref 42–52)
HDLC SERPL-MCNC: 71 MG/DL (ref 40–59)
HEMOGLOBIN: 16.7 G/DL (ref 14–18)
LDL CHOLESTEROL CALCULATED: 131 MG/DL (ref 0–129)
LYMPHOCYTES ABSOLUTE: 1.4 K/UL (ref 1–4.8)
LYMPHOCYTES RELATIVE PERCENT: 22.6 %
MCH RBC QN AUTO: 28.9 PG (ref 27–31.3)
MCHC RBC AUTO-ENTMCNC: 34 % (ref 33–37)
MCV RBC AUTO: 84.9 FL (ref 80–100)
MONOCYTES ABSOLUTE: 0.4 K/UL (ref 0.2–0.8)
MONOCYTES RELATIVE PERCENT: 6.5 %
NEUTROPHILS ABSOLUTE: 4 K/UL (ref 1.4–6.5)
NEUTROPHILS RELATIVE PERCENT: 66.3 %
PDW BLD-RTO: 14 % (ref 11.5–14.5)
PLATELET # BLD: 254 K/UL (ref 130–400)
POTASSIUM SERPL-SCNC: 4.4 MEQ/L (ref 3.4–4.9)
PROSTATE SPECIFIC ANTIGEN: 2.75 NG/ML (ref 0–3.89)
RBC # BLD: 5.77 M/UL (ref 4.7–6.1)
SODIUM BLD-SCNC: 144 MEQ/L (ref 135–144)
TOTAL PROTEIN: 7.2 G/DL (ref 6.3–8)
TRIGL SERPL-MCNC: 107 MG/DL (ref 0–150)
WBC # BLD: 6.1 K/UL (ref 4.8–10.8)

## 2020-08-21 ENCOUNTER — TELEPHONE (OUTPATIENT)
Dept: PRIMARY CARE CLINIC | Age: 55
End: 2020-08-21

## 2020-08-24 ENCOUNTER — OFFICE VISIT (OUTPATIENT)
Dept: PRIMARY CARE CLINIC | Age: 55
End: 2020-08-24
Payer: COMMERCIAL

## 2020-08-24 VITALS
RESPIRATION RATE: 14 BRPM | BODY MASS INDEX: 27.16 KG/M2 | DIASTOLIC BLOOD PRESSURE: 98 MMHG | OXYGEN SATURATION: 96 % | SYSTOLIC BLOOD PRESSURE: 138 MMHG | HEART RATE: 79 BPM | TEMPERATURE: 97.6 F | HEIGHT: 71 IN | WEIGHT: 194 LBS

## 2020-08-24 PROCEDURE — 99396 PREV VISIT EST AGE 40-64: CPT | Performed by: INTERNAL MEDICINE

## 2020-08-24 SDOH — ECONOMIC STABILITY: INCOME INSECURITY: HOW HARD IS IT FOR YOU TO PAY FOR THE VERY BASICS LIKE FOOD, HOUSING, MEDICAL CARE, AND HEATING?: NOT HARD AT ALL

## 2020-08-24 SDOH — ECONOMIC STABILITY: FOOD INSECURITY: WITHIN THE PAST 12 MONTHS, THE FOOD YOU BOUGHT JUST DIDN'T LAST AND YOU DIDN'T HAVE MONEY TO GET MORE.: NEVER TRUE

## 2020-08-24 SDOH — ECONOMIC STABILITY: FOOD INSECURITY: WITHIN THE PAST 12 MONTHS, YOU WORRIED THAT YOUR FOOD WOULD RUN OUT BEFORE YOU GOT MONEY TO BUY MORE.: NEVER TRUE

## 2020-08-24 ASSESSMENT — ENCOUNTER SYMPTOMS
PHOTOPHOBIA: 0
SHORTNESS OF BREATH: 0
NAUSEA: 0
CHOKING: 0
VOMITING: 0
TROUBLE SWALLOWING: 0
VOICE CHANGE: 0

## 2020-08-24 NOTE — PROGRESS NOTES
Juliane Ba 62 y.o. male presents today with   Chief Complaint   Patient presents with    Annual Exam       HPI annual   5 years since last insole for plantar faciatis. Mole on inner leg with color change. Past Medical History:   Diagnosis Date    Anal fistula     Arthritis     Asthma     Eczema     steroid christina work    GERD (gastroesophageal reflux disease)     Plantar fasciitis, bilateral     insoles work    Smoker     quit 2007     Patient Active Problem List    Diagnosis Date Noted    Periradicular pathology associated with previous endodontic treatment 02/13/2018    Generalized anxiety disorder 02/13/2018    Obsessive-compulsive disorder 02/13/2018    Reflux esophagitis 02/13/2018    Perirectal abscess     Dermatitis 10/04/2016    Dermatofibroma of right thigh 10/04/2016    Acquired equinus deformity of both feet 03/14/2016    Calcaneal spur 03/14/2016    Plantar fasciitis, left 03/14/2016    Mixed hyperlipidemia 10/08/2015    Dermatophytosis of foot 05/09/2012    Other seborrheic keratosis 11/11/2011    Idiopathic urticaria 04/03/2009    Allergic rhinitis due to pollen 01/23/2009    Moderate persistent asthma 01/33/7194    Umbilical hernia 26/95/7313    Constipation 12/04/2001    Depressive disorder, not elsewhere classified 12/04/2001     Past Surgical History:   Procedure Laterality Date    NH I&D RECTAL SUBMUCOSAL ABSCESS N/A 2/13/2018    INCISION AND DRAINAGE PERIRECTAL RECTAL ABSCESS performed by Brooke Orosco MD at [facility] Right     dislocation      No family history on file.   Social History     Socioeconomic History    Marital status: Single     Spouse name: None    Number of children: None    Years of education: None    Highest education level: None   Occupational History    None   Social Needs    Financial resource strain: Not hard at all   knowNormal-Lorenzo insecurity     Worry: Never true     Inability: Never true   [place] needs Medical: None     Non-medical: None   Tobacco Use    Smoking status: Former Smoker     Packs/day: 1.50     Years: 26.00     Pack years: 39.00     Last attempt to quit: 2006     Years since quittin.1    Smokeless tobacco: Never Used   Substance and Sexual Activity    Alcohol use: No    Drug use: No    Sexual activity: Never   Lifestyle    Physical activity     Days per week: None     Minutes per session: None    Stress: None   Relationships    Social connections     Talks on phone: None     Gets together: None     Attends Nondenominational service: None     Active member of club or organization: None     Attends meetings of clubs or organizations: None     Relationship status: None    Intimate partner violence     Fear of current or ex partner: None     Emotionally abused: None     Physically abused: None     Forced sexual activity: None   Other Topics Concern    None   Social History Narrative    None     Allergies   Allergen Reactions    Amoxicillin     Clindamycin/Lincomycin        Review of Systems   Constitutional: Negative for fatigue and fever. HENT: Negative for congestion, trouble swallowing and voice change. Eyes: Negative for photophobia and visual disturbance. Respiratory: Negative for choking and shortness of breath. Cardiovascular: Negative for chest pain and palpitations. Gastrointestinal: Negative for nausea and vomiting. Genitourinary: Negative for urgency. Skin: Positive for color change. Negative for rash. Neurological: Negative for tremors and syncope. Hematological: Does not bruise/bleed easily. Psychiatric/Behavioral: Negative for suicidal ideas. Vitals:    20 0918   BP: (!) 138/98   Pulse: 79   Resp: 14   Temp: 97.6 °F (36.4 °C)   SpO2: 96%   Weight: 194 lb (88 kg)   Height: 5' 10.5\" (1.791 m)       Physical Exam  Constitutional:       Appearance: He is well-developed. HENT:      Head: Normocephalic and atraumatic.       Mouth/Throat: Mouth: Mucous membranes are moist.   Eyes:      Conjunctiva/sclera: Conjunctivae normal.      Pupils: Pupils are equal, round, and reactive to light. Neck:      Musculoskeletal: Normal range of motion. Cardiovascular:      Rate and Rhythm: Normal rate and regular rhythm. Pulmonary:      Effort: Pulmonary effort is normal. No respiratory distress. Breath sounds: No wheezing. Abdominal:      General: Bowel sounds are normal. There is no distension. Palpations: Abdomen is soft. Genitourinary:     Prostate: Normal.      Rectum: Normal.   Musculoskeletal: Normal range of motion. Skin:     General: Skin is dry. Coloration: Skin is not jaundiced. Neurological:      Mental Status: He is alert. Cranial Nerves: No cranial nerve deficit. Psychiatric:         Mood and Affect: Mood normal.       Assessment/Plan  Adan Mcneill was seen today for annual exam.    Diagnoses and all orders for this visit:    Preventative health care    Plantar fasciitis, bilateral  -     [facility] - Brysono Lupillo, DPM, Podiatry, [place]    Change in color of skin mole  -     [facility]        Return in about 1 year (around 8/24/2021), or if symptoms worsen or fail to improve.     Elena Diggs MD

## 2020-08-25 ASSESSMENT — ENCOUNTER SYMPTOMS: COLOR CHANGE: 1

## 2020-09-14 ENCOUNTER — INITIAL CONSULT (OUTPATIENT)
Dept: PODIATRY | Facility: CLINIC | Age: 55
End: 2020-09-14

## 2020-09-14 VITALS — BODY MASS INDEX: 27.63 KG/M2 | TEMPERATURE: 97.4 F | WEIGHT: 193 LBS | HEIGHT: 70 IN

## 2020-09-14 ASSESSMENT — ENCOUNTER SYMPTOMS
CONSTIPATION: 0
NAUSEA: 0
BACK PAIN: 1
SHORTNESS OF BREATH: 0
DIARRHEA: 0

## 2020-09-14 NOTE — PROGRESS NOTES
HOURS AS NEEDED AS DIRECTED 3 Inhaler 3    aspirin 81 MG tablet Take 81 mg by mouth daily      triamcinolone (KENALOG) 0.1 % cream Apply a thin film to the affected area twice daily until resolved. 240 g 3    pantoprazole (PROTONIX) 40 MG tablet Take 1 tablet by mouth daily 90 tablet 3    fluticasone (FLONASE) 50 MCG/ACT nasal spray 1 spray by Each Nare route daily 3 Bottle 3    cetirizine (ZYRTEC ALLERGY) 10 MG tablet Take 10 mg by mouth      acetaminophen (TYLENOL) 325 MG tablet Take 650 mg by mouth every 6 hours as needed for Pain      Multiple Vitamins-Minerals (THERAPEUTIC MULTIVITAMIN-MINERALS) tablet Take 1 tablet by mouth daily      Omega-3 Fatty Acids (FISH OIL) 1000 MG CPDR Take 1,000 mg by mouth daily       No current facility-administered medications on file prior to visit.         Past Medical History:   Diagnosis Date    Anal fistula     Arthritis     Asthma     Eczema     steroid christina work    GERD (gastroesophageal reflux disease)     Plantar fasciitis, bilateral     insoles work    Smoker     quit      Past Surgical History:   Procedure Laterality Date    [place] I&D RECTAL SUBMUCOSAL ABSCESS N/A 2018    INCISION AND DRAINAGE PERIRECTAL RECTAL ABSCESS performed by Mis Potts MD at [facility] Right     dislocation      Social History     Socioeconomic History    Marital status: Single     Spouse name: Not on file    Number of children: Not on file    Years of education: Not on file    Highest education level: Not on file   Occupational History    Not on file   Social Needs    Financial resource strain: Not hard at all    Food insecurity     Worry: Never true     Inability: Never true   [employer] needs     Medical: Not on file     Non-medical: Not on file   Tobacco Use    Smoking status: Former Smoker     Packs/day: 1.50     Years: 26.00     Pack years: 39.00     Last attempt to quit: 2006     Years since quittin.1    Smokeless symptoms were not reproduced with percussion of the medial or lateral calcaneal nerves bilaterally. Dermatological:  No open lesions noted bilateral foot. The hallux toenail is absent bilateral foot, the remaining toenails are incurvated and are well groomed bilaterally. Normal skin texture noted bilaterally. Focal hyperkeratotic lesions noted: none. Normal skin turgor noted bilaterally. Webspace 1-4 is dry and intact bilateral foot. Musculoskeletal:  Planus foot type noted bilaterally. Manual muscle strength is graded at 5/5 for all groups tested bilateral foot. Gross static deformities noted: bony prominences noted the dorsum of the head of the first metatarsal bilateral foot. Pain or crepitus noted with active or passive range of motion of the joints of the foot or ankle: none. Functional hallux limitus noted bilaterally. Decreased ankle joint dorsiflexion noted bilateral lower extremity. There is tenderness to palpation of the medial calcaneal tubercle bilateral plantar foot. No pain elicited with medial to lateral compression of the heel bilaterally. No tenderness to palpation to the Achilles tendon insertion or the musculotendinous junction bilaterally. Psychiatric:    Judgement and insight intact. Short and long term memory intact. No evidence of depression, anxiety, or agitation. Patient is calm, cooperative, and communicative. Appropriate interactions and affect. Assessment:      Diagnosis Orders   1. Pain in both feet  FL FT INSERT UCB LUNA SHELL   2. Bilateral plantar fasciitis  FL FT INSERT UCB LUNA SHELL   3. Acquired hallux limitus of both feet  FL FT INSERT UCB LUNA SHELL       Plan:     Plantar fasciitis:  I discussed the nature and etiology of the condition with the patient in detail. Patient is to continue daily home exercise plan. I have recommended new custom orthotics with a 1st ray cutout to improve joint motion and relax the plantar fascia.  I have also recommended that the patient avoid using motion control shoes with the orthotics. Orders Placed This Encounter   Procedures    MO FT INSERT UCB LUNA MACEDO     Prescriptioin: Qty 2; self-pay     Standing Status:   Future     Standing Expiration Date:   12/13/2020     All questions were answered to the patient's satisfaction. Thank you for allowing me to participate in the care of your patient. Follow up:  Return in about 1 week (around 9/21/2020) for orthotic casting. Arie Holden DPM      Please note that this report has been partially produced using speech recognition software which may cause errors including grammar, punctuation, and spelling or words and phrases that may seem inappropriate. If there are questions or concerns please feel free to contact me for clarification.

## 2020-09-21 ENCOUNTER — OFFICE VISIT (OUTPATIENT)
Dept: PODIATRY | Facility: CLINIC | Age: 55
End: 2020-09-21

## 2020-09-21 NOTE — PROGRESS NOTES
Shantanu Gill  (1965)    9/21/20    Subjective     Shantanu Gill is 62 y.o. male who complains today of:    Chief Complaint   Patient presents with    Foot Pain     HPI: Patient returns today for follow up of painful plantar fasciitis. There has been no significant improvement since last visit. The patient has continued prior conservative measures as instructed. No new complaints today. The patient wishes to proceed with making impressions for new custom orthotics. Allergies:  Amoxicillin and Clindamycin/lincomycin    Current Outpatient Medications on File Prior to Visit   Medication Sig Dispense Refill    triamcinolone (KENALOG) 0.1 % cream Apply a thin film to the affected area twice daily until resolved. 240 g 3    pantoprazole (PROTONIX) 40 MG tablet Take 1 tablet by mouth daily 90 tablet 3    fluticasone (FLONASE) 50 MCG/ACT nasal spray 1 spray by Each Nare route daily 3 Bottle 3    albuterol sulfate HFA (PROAIR HFA) 108 (90 Base) MCG/ACT inhaler USE 2 INHALATIONS EVERY 4 HOURS AS NEEDED AS DIRECTED 3 Inhaler 3    cetirizine (ZYRTEC ALLERGY) 10 MG tablet Take 10 mg by mouth      acetaminophen (TYLENOL) 325 MG tablet Take 650 mg by mouth every 6 hours as needed for Pain      aspirin 81 MG tablet Take 81 mg by mouth daily      Multiple Vitamins-Minerals (THERAPEUTIC MULTIVITAMIN-MINERALS) tablet Take 1 tablet by mouth daily      Omega-3 Fatty Acids (FISH OIL) 1000 MG CPDR Take 1,000 mg by mouth daily       No current facility-administered medications on file prior to visit.         Past Medical History:   Diagnosis Date    Anal fistula     Arthritis     Asthma     Eczema     steroid christina work    GERD (gastroesophageal reflux disease)     Plantar fasciitis, bilateral     insoles work    Smoker     quit 2007     Past Surgical History:   Procedure Laterality Date    [place] I&D RECTAL SUBMUCOSAL ABSCESS N/A 2/13/2018    INCISION AND DRAINAGE PERIRECTAL RECTAL ABSCESS performed by Lencho Baez MD at [facility] Right     dislocation      Social History     Socioeconomic History    Marital status: Single     Spouse name: Not on file    Number of children: Not on file    Years of education: Not on file    Highest education level: Not on file   Occupational History    Not on file   Social Needs    Financial resource strain: Not hard at all    Food insecurity     Worry: Never true     Inability: Never true   [employer] needs     Medical: Not on file     Non-medical: Not on file   Tobacco Use    Smoking status: Former Smoker     Packs/day: 1.50     Years: 26.00     Pack years: 39.00     Last attempt to quit: 2006     Years since quittin.2    Smokeless tobacco: Never Used   Substance and Sexual Activity    Alcohol use: No    Drug use: No    Sexual activity: Never   Lifestyle    Physical activity     Days per week: Not on file     Minutes per session: Not on file    Stress: Not on file   Relationships    Social connections     Talks on phone: Not on file     Gets together: Not on file     Attends Quaker service: Not on file     Active member of club or organization: Not on file     Attends meetings of clubs or organizations: Not on file     Relationship status: Not on file    Intimate partner violence     Fear of current or ex partner: Not on file     Emotionally abused: Not on file     Physically abused: Not on file     Forced sexual activity: Not on file   Other Topics Concern    Not on file   Social History Narrative    Not on file     History reviewed. No pertinent family history. Objective:     Physical Exam  Musculoskeletal:  Planus foot type noted bilaterally. Manual muscle strength is graded at 5/5 for all groups tested bilateral foot. Bony prominences noted the dorsum of the head of the first metatarsal bilateral foot. No pain or crepitus noted with range of motion of the joints of the foot or ankle.    Functional hallux limitus noted bilaterally. Decreased ankle joint dorsiflexion noted bilateral lower extremity. There is tenderness to palpation of the medial calcaneal tubercle bilateral plantar foot. Assessment:      Diagnosis Orders   1. Pain in both feet     2. Bilateral plantar fasciitis     3. Acquired hallux limitus of both feet         Plan:     The patient is evaluated and found to have a biomechanical dysfunction causing  significant pain and deformity. It is recommended that use of a custom orthotic be undertaken to treat this  deformity and malalignment while reducing pain. After obtaining appropriate range of motion measurements of the hindfoot to forefoot relationship, negative plaster casting impressions of the right and left foot were made using a neutral suspension technique. Plaster casting material was used for the right and left foot to make the  impressions. These functional foot orthotics will be packaged, handled, and mailed by an outside  laboratory and fashioned as removable devices. The patient is to continue previously enacted conservative measures. The patient will be contacted when the orthotics have arrived. Follow up:  Return for orthotic dispense. Herminia Amaya DPM      Please note that this report has been partially produced using speech recognition software which may cause errors including grammar, punctuation, and spelling or words and phrases that may seem inappropriate. If there are questions or concerns please feel free to contact me for clarification.

## 2020-10-26 ENCOUNTER — OFFICE VISIT (OUTPATIENT)
Dept: PODIATRY | Facility: CLINIC | Age: 55
End: 2020-10-26

## 2020-10-26 VITALS — TEMPERATURE: 97.7 F | HEIGHT: 70 IN | BODY MASS INDEX: 27.63 KG/M2 | WEIGHT: 193 LBS

## 2020-10-26 ASSESSMENT — ENCOUNTER SYMPTOMS
NAUSEA: 0
BACK PAIN: 0
CONSTIPATION: 0
DIARRHEA: 0
SHORTNESS OF BREATH: 0

## 2020-10-26 NOTE — PROGRESS NOTES
by mouth daily      Omega-3 Fatty Acids (FISH OIL) 1000 MG CPDR Take 1,000 mg by mouth daily       No current facility-administered medications on file prior to visit.         Past Medical History:   Diagnosis Date    Anal fistula     Arthritis     Asthma     Eczema     steroid christina work    GERD (gastroesophageal reflux disease)     Plantar fasciitis, bilateral     insoles work    Smoker     quit      Past Surgical History:   Procedure Laterality Date    [place] I&D RECTAL SUBMUCOSAL ABSCESS N/A 2018    INCISION AND DRAINAGE PERIRECTAL RECTAL ABSCESS performed by Santiago Cintron MD at [address] [place] [address] Right     dislocation      Social History     Socioeconomic History    Marital status: Single     Spouse name: Not on file    Number of children: Not on file    Years of education: Not on file    Highest education level: Not on file   Occupational History    Not on file   Social Needs    Financial resource strain: Not hard at all    Food insecurity     Worry: Never true     Inability: Never true   [employer] needs     Medical: Not on file     Non-medical: Not on file   Tobacco Use    Smoking status: Former Smoker     Packs/day: 1.50     Years: 26.00     Pack years: 39.00     Last attempt to quit: 2006     Years since quittin.3    Smokeless tobacco: Never Used   Substance and Sexual Activity    Alcohol use: No    Drug use: No    Sexual activity: Never   Lifestyle    Physical activity     Days per week: Not on file     Minutes per session: Not on file    Stress: Not on file   Relationships    Social connections     Talks on phone: Not on file     Gets together: Not on file     Attends Taoism service: Not on file     Active member of club or organization: Not on file     Attends meetings of clubs or organizations: Not on file     Relationship status: Not on file    Intimate partner violence     Fear of current or ex partner: Not on file     Emotionally abused: Not on file     Physically abused: Not on file     Forced sexual activity: Not on file   Other Topics Concern    Not on file   Social History Narrative    Not on file     History reviewed. No pertinent family history. Objective:   Vitals:  Temp 97.7 °F (36.5 °C)   Ht 5' 10\" (1.778 m)   Wt 193 lb (87.5 kg)   BMI 27.69 kg/m² Pain Score:   0 - No pain    Physical Exam  Custom molded orthotics were dispensed today. The devices were compared to the feet and found to be the appropriate size, length and conform to the plantar foot. The patient stood on the devices outside of their shoes and correction of the gross static deformity and improved foot to leg alignment/ relationship were noted. The devices were inserted into the patient's shoes and found to fit well. The patient denied areas of friction or irritation while ambulating in the office with the devices in place. Assessment:      Diagnosis Orders   1. Pain in both feet     2. Bilateral plantar fasciitis     3. Acquired hallux limitus of both feet         Plan:     The orthotics were dispensed and fit to the patient's existing shoe gear. Explicit break-in instructions were dicussed/ dispensed, break-in should take approximately four weeks. The patient was counseled today regarding the buying and fitting of appropriate footwear. The patient will continue with all other concomitant/ previous  conservative care rendered. The exam, fitting, gait analysis, break in protocol discussion, and shoe gear discussion required more than 15 minutes to complete. Return to clinic in four weeks, or sooner, should concerns arise. Follow up:  Return in about 4 weeks (around 11/23/2020). Morales Muñoz DPM      Please note that this report has been partially produced using speech recognition software which may cause errors including grammar, punctuation, and spelling or words and phrases that may seem inappropriate.  If there are questions or concerns please feel free to contact me for clarification.

## 2020-11-16 ENCOUNTER — TELEPHONE (OUTPATIENT)
Dept: ADMINISTRATIVE | Age: 55
End: 2020-11-16

## 2020-11-20 RX ORDER — PANTOPRAZOLE SODIUM 40 MG/1
40 TABLET, DELAYED RELEASE ORAL DAILY
Qty: 90 TABLET | Refills: 3 | Status: SHIPPED | OUTPATIENT
Start: 2020-11-20 | End: 2021-08-23 | Stop reason: SDUPTHER

## 2020-11-20 RX ORDER — BUDESONIDE AND FORMOTEROL FUMARATE DIHYDRATE 160; 4.5 UG/1; UG/1
2 AEROSOL RESPIRATORY (INHALATION) 2 TIMES DAILY
Qty: 3 INHALER | Refills: 3 | Status: SHIPPED | OUTPATIENT
Start: 2020-11-20 | End: 2021-07-26 | Stop reason: ALTCHOICE

## 2020-11-23 ENCOUNTER — OFFICE VISIT (OUTPATIENT)
Dept: PODIATRY | Facility: CLINIC | Age: 55
End: 2020-11-23

## 2020-11-23 VITALS — HEIGHT: 71 IN | BODY MASS INDEX: 27.3 KG/M2 | WEIGHT: 195 LBS | TEMPERATURE: 96.6 F

## 2020-11-23 RX ORDER — METHYLPREDNISOLONE 4 MG/1
TABLET ORAL
Qty: 1 KIT | Refills: 0 | Status: SHIPPED | OUTPATIENT
Start: 2020-11-23 | End: 2020-11-29

## 2020-11-23 ASSESSMENT — ENCOUNTER SYMPTOMS
NAUSEA: 0
BACK PAIN: 0
DIARRHEA: 0
SHORTNESS OF BREATH: 0
CONSTIPATION: 0

## 2020-11-23 NOTE — PROGRESS NOTES
Brian Clemente  (1965)    11/23/20    Subjective     Brian Clemente is 62 y.o. male who complains today of:    Chief Complaint   Patient presents with    Toe Pain    Foot Pain       HPI: The patient presents 4 weeks s/p dispensing of custom orthotics. He complains of bilateral heel pain. 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 Wearing them in these shoes causes arch pain and exacerbates heel pain. He has been wearing his old orthotics with these shoes as there is less discomfort. He has been wearing them in the slip resistant shoes he wears while working at [employer], he has no pain with this combination of shoe and device. Toe Pain          Review of Systems   Constitutional: Negative for fever. HENT: Negative for hearing loss. Respiratory: Negative for shortness of breath. Gastrointestinal: Negative for constipation, diarrhea and nausea. Genitourinary: Negative for difficulty urinating. Musculoskeletal: Negative for back pain and neck pain. Skin: Negative for rash. Neurological: Negative for headaches. Hematological: Does not bruise/bleed easily. Psychiatric/Behavioral: Negative for sleep disturbance. He has not tried physical therapy. Date of last of last PT treatment: none    The patient is NOT a diabetic. Allergies:  Amoxicillin and Clindamycin/lincomycin    Current Outpatient Medications on File Prior to Visit   Medication Sig Dispense Refill    pantoprazole (PROTONIX) 40 MG tablet Take 1 tablet by mouth daily 90 tablet 3    SYMBICORT 160-4.5 MCG/ACT AERO Inhale 2 puffs into the lungs 2 times daily 3 Inhaler 3    triamcinolone (KENALOG) 0.1 % cream Apply a thin film to the affected area twice daily until resolved.  240 g 3    fluticasone (FLONASE) 50 MCG/ACT nasal spray 1 spray by Each Nare route daily 3 Bottle 3    albuterol sulfate HFA (PROAIR HFA) 108 (90 Base) MCG/ACT inhaler USE 2 INHALATIONS EVERY 4 HOURS AS NEEDED AS DIRECTED 3 Inhaler 3    cetirizine (ZYRTEC ALLERGY) 10 MG tablet Take 10 mg by mouth      acetaminophen (TYLENOL) 325 MG tablet Take 650 mg by mouth every 6 hours as needed for Pain      aspirin 81 MG tablet Take 81 mg by mouth daily      Multiple Vitamins-Minerals (THERAPEUTIC MULTIVITAMIN-MINERALS) tablet Take 1 tablet by mouth daily      Omega-3 Fatty Acids (FISH OIL) 1000 MG CPDR Take 1,000 mg by mouth daily       No current facility-administered medications on file prior to visit.         Past Medical History:   Diagnosis Date    Anal fistula     Arthritis     Asthma     Eczema     steroid christina work    GERD (gastroesophageal reflux disease)     Plantar fasciitis, bilateral     insoles work    Smoker     quit      Past Surgical History:   Procedure Laterality Date    OR I&D RECTAL SUBMUCOSAL ABSCESS N/A 2018    INCISION AND DRAINAGE PERIRECTAL RECTAL ABSCESS performed by Memo Rodriguez MD at [facility]     dislocation      Social History     Socioeconomic History    Marital status: Single     Spouse name: Not on file    Number of children: Not on file    Years of education: Not on file    Highest education level: Not on file   Occupational History    Not on file   Social Needs    Financial resource strain: Not hard at all    Food insecurity     Worry: Never true     Inability: Never true   [employer] needs     Medical: Not on file     Non-medical: Not on file   Tobacco Use    Smoking status: Former Smoker     Packs/day: 1.50     Years: 26.00     Pack years: 39.00     Last attempt to quit: 2006     Years since quittin.3    Smokeless tobacco: Never Used   Substance and Sexual Activity    Alcohol use: No    Drug use: No    Sexual activity: Never   Lifestyle    Physical activity     Days per week: Not on file     Minutes per session: Not on file    Stress: Not on file   Relationships    Social connections     Talks on phone: Not on file     Gets together: Not on file     Attends Restorationist service: Not on file     Active member of club or organization: Not on file     Attends meetings of clubs or organizations: Not on file     Relationship status: Not on file    Intimate partner violence     Fear of current or ex partner: Not on file     Emotionally abused: Not on file     Physically abused: Not on file     Forced sexual activity: Not on file   Other Topics Concern    Not on file   Social History Narrative    Not on file     No family history on file. Objective:   Vitals:  Temp 96.6 °F (35.9 °C) (Temporal)   Ht 5' 10.5\" (1.791 m)   Wt 195 lb (88.5 kg)   BMI 27.58 kg/m² Pain Score:   0 - No pain    Physical Exam  Vascular:   Dorsalis pedis pulse is palpable bilateral foot. Posterior tibial pulse is palpable bilateral foot. There is no edema noted to the bilateral lower extremity. Capillary refill is immediate bilateral hallux. There are no varicosities noted bilaterally. There are no telangiectasia noted bilaterally. Skin temperature gradient is noted to be warm to warm bilaterally. There are no signs of ischemia or skin atrophy noted bilaterally. Hair growth is present bilaterally.     Neurological:   Light touch sensation is absent bilaterally. Patellar deep tendon reflex is graded at 2/4 bilaterally. Achilles tendon deep tendon reflex is graded at 2/4 bilaterally. The Babinski response is negative bilaterally. No ankle clonus is noted bilaterally.     Dermatological:  No open lesions noted bilateral foot. The hallux toenail is absent bilateral foot, the remaining toenails are incurvated and are well groomed bilaterally. Normal skin texture noted bilaterally. Focal hyperkeratotic lesions noted: none. Normal skin turgor noted bilaterally.   Webspace 1-4 is dry and intact bilateral foot.     Musculoskeletal:  Planus foot type noted bilaterally. Manual muscle strength is graded at 5/5 for all groups tested bilateral foot. Bony prominences noted the dorsum of the head of the first metatarsal bilateral foot. No pain or crepitus noted with range of motion of the joints of the foot or ankle. Functional hallux limitus noted bilaterally. Decreased ankle joint dorsiflexion noted bilateral lower extremity. There is tenderness to palpation of the medial calcaneal tubercle bilateral plantar foot. Assessment:      Diagnosis Orders   1. Pain in both feet     2. Bilateral plantar fasciitis     3. Acquired hallux limitus of both feet         Plan:     Patient encouraged to obtain new cushioned running shoes to wear while at work at the [employer]. Patient instructed to take the orthotics with him to make sure they fit shoe. Patient instructed to stretch and ice daily. Prescription for Medrol Dosepak was dispensed. Patient instructed to follow-up in April, prior to the end of the 6 month warranty against defects for the orthotics from [employer] Orders Placed This Encounter   Medications    methylPREDNISolone (MEDROL DOSEPACK) 4 MG tablet     Sig: Take by mouth. Dispense:  1 kit     Refill:  0         Follow up:  Return in about 20 weeks (around 4/12/2021). Nicole Haji DPM      Please note that this report has been partially produced using speech recognition software which may cause errors including grammar, punctuation, and spelling or words and phrases that may seem inappropriate. If there are questions or concerns please feel free to contact me for clarification.

## 2021-02-23 ENCOUNTER — TELEPHONE (OUTPATIENT)
Dept: PRIMARY CARE CLINIC | Age: 56
End: 2021-02-23

## 2021-02-23 DIAGNOSIS — J45.40 MODERATE PERSISTENT ASTHMA, UNSPECIFIED WHETHER COMPLICATED: Primary | ICD-10-CM

## 2021-02-23 RX ORDER — ALBUTEROL SULFATE 90 UG/1
2 AEROSOL, METERED RESPIRATORY (INHALATION) 4 TIMES DAILY PRN
Qty: 3 INHALER | Refills: 3 | Status: SHIPPED | OUTPATIENT
Start: 2021-02-23 | End: 2021-07-26 | Stop reason: ALTCHOICE

## 2021-02-23 NOTE — TELEPHONE ENCOUNTER
Patient asking for new medication that he wants called in and he checked with his insurance   Cymbicort is too expensive can't afford it and he has insurance and it is $200 out of pocket  Needs generic Fluticasone / salmeterol   [facility] said for a 90 day supply with 3 refills  Write it as a EDEN-9   (Generic for advair disc)  Cymbicort is 160 / 4.5 is strengh he uses     Was told pro-air not covered   Generic - albuterol inhaler 90 day supply with 3 refills  90 mcg     These will be no charge to him if you send this into him

## 2021-04-19 ENCOUNTER — OFFICE VISIT (OUTPATIENT)
Dept: PRIMARY CARE CLINIC | Age: 56
End: 2021-04-19
Payer: COMMERCIAL

## 2021-04-19 VITALS
DIASTOLIC BLOOD PRESSURE: 80 MMHG | HEART RATE: 87 BPM | OXYGEN SATURATION: 99 % | TEMPERATURE: 97.7 F | SYSTOLIC BLOOD PRESSURE: 122 MMHG

## 2021-04-19 DIAGNOSIS — G56.03 BILATERAL CARPAL TUNNEL SYNDROME: Primary | ICD-10-CM

## 2021-04-19 PROCEDURE — 99213 OFFICE O/P EST LOW 20 MIN: CPT | Performed by: INTERNAL MEDICINE

## 2021-04-19 ASSESSMENT — ENCOUNTER SYMPTOMS
COUGH: 0
SHORTNESS OF BREATH: 0
NAUSEA: 0
TROUBLE SWALLOWING: 0
VOICE CHANGE: 0
VOMITING: 0

## 2021-04-19 ASSESSMENT — PATIENT HEALTH QUESTIONNAIRE - PHQ9
SUM OF ALL RESPONSES TO PHQ QUESTIONS 1-9: 0
2. FEELING DOWN, DEPRESSED OR HOPELESS: 0
SUM OF ALL RESPONSES TO PHQ QUESTIONS 1-9: 0
SUM OF ALL RESPONSES TO PHQ QUESTIONS 1-9: 0

## 2021-04-19 NOTE — PROGRESS NOTES
Donald Oliveira 60 y.o. male presents today with   Chief Complaint   Patient presents with    Numbness     in right arm getting worse       Wrist Pain   The pain is present in the left wrist and right wrist. This is a recurrent problem. The current episode started more than 1 year ago. The problem occurs daily. The problem has been waxing and waning. The quality of the pain is described as aching. The pain is at a severity of 1/10. The pain is mild. Associated symptoms include numbness and stiffness. Pertinent negatives include no fever. Past Medical History:   Diagnosis Date    Anal fistula     Arthritis     Asthma     Eczema     steroid christina work    GERD (gastroesophageal reflux disease)     Plantar fasciitis, bilateral     insoles work    Smoker     quit 2007     Patient Active Problem List    Diagnosis Date Noted    Periradicular pathology associated with previous endodontic treatment 02/13/2018    Generalized anxiety disorder 02/13/2018    Obsessive-compulsive disorder 02/13/2018    Reflux esophagitis 02/13/2018    Perirectal abscess     Dermatitis 10/04/2016    Dermatofibroma of right thigh 10/04/2016    Acquired equinus deformity of both feet 03/14/2016    Calcaneal spur 03/14/2016    Plantar fasciitis, left 03/14/2016    Mixed hyperlipidemia 10/08/2015    Dermatophytosis of foot 05/09/2012    Other seborrheic keratosis 11/11/2011    Idiopathic urticaria 04/03/2009    Allergic rhinitis due to pollen 01/23/2009    Moderate persistent asthma 99/87/8605    Umbilical hernia 84/77/5640    Constipation 12/04/2001    Depressive disorder, not elsewhere classified 12/04/2001     Past Surgical History:   Procedure Laterality Date    MT I&D RECTAL SUBMUCOSAL ABSCESS N/A 2/13/2018    INCISION AND DRAINAGE PERIRECTAL RECTAL ABSCESS performed by Yinka Dougherty MD at [facility]     dislocation      No family history on file.   Social History     Socioeconomic History    Marital status: Single     Spouse name: Not on file    Number of children: Not on file    Years of education: Not on file    Highest education level: Not on file   Occupational History    Not on file   Social Needs    Financial resource strain: Not hard at all    Food insecurity     Worry: Never true     Inability: Never true   [employer] needs     Medical: Not on file     Non-medical: Not on file   Tobacco Use    Smoking status: Former Smoker     Packs/day: 1.50     Years: 26.00     Pack years: 39.00     Quit date: 2006     Years since quittin.7    Smokeless tobacco: Never Used   Substance and Sexual Activity    Alcohol use: No    Drug use: No    Sexual activity: Never   Lifestyle    Physical activity     Days per week: Not on file     Minutes per session: Not on file    Stress: Not on file   Relationships    Social connections     Talks on phone: Not on file     Gets together: Not on file     Attends Yazidism service: Not on file     Active member of club or organization: Not on file     Attends meetings of clubs or organizations: Not on file     Relationship status: Not on file    Intimate partner violence     Fear of current or ex partner: Not on file     Emotionally abused: Not on file     Physically abused: Not on file     Forced sexual activity: Not on file   Other Topics Concern    Not on file   Social History Narrative    Not on file     Allergies   Allergen Reactions    Amoxicillin     Clindamycin/Lincomycin        Review of Systems   Constitutional: Negative for fatigue and fever. HENT: Negative for trouble swallowing and voice change. Eyes: Negative for visual disturbance. Respiratory: Negative for cough and shortness of breath. Cardiovascular: Negative for chest pain and palpitations. Gastrointestinal: Negative for nausea and vomiting. Genitourinary: Negative for urgency. Musculoskeletal: Positive for stiffness. Skin: Negative for rash. Neurological: Positive for numbness. Negative for tremors and syncope. Hematological: Does not bruise/bleed easily. Psychiatric/Behavioral: Negative for suicidal ideas. Vitals:    04/19/21 1013   BP: 122/80   Pulse: 87   Temp: 97.7 °F (36.5 °C)   SpO2: 99%       Physical Exam  Constitutional:       Appearance: He is well-developed. HENT:      Head: Normocephalic and atraumatic. Eyes:      Conjunctiva/sclera: Conjunctivae normal.   Neck:      Musculoskeletal: Normal range of motion. Cardiovascular:      Rate and Rhythm: Normal rate and regular rhythm. Pulmonary:      Effort: Pulmonary effort is normal. No respiratory distress. Abdominal:      General: There is no distension. Musculoskeletal: Normal range of motion. Skin:     Coloration: Skin is not jaundiced. Neurological:      Mental Status: He is alert. Cranial Nerves: No cranial nerve deficit. Psychiatric:         Mood and Affect: Mood normal.     Assessment/Plan  Naty Billings was seen today for numbness. Diagnoses and all orders for this visit:    Bilateral carpal tunnel syndrome  -     EMG; Future        No follow-ups on file.     Claudine Kan MD

## 2021-04-28 ENCOUNTER — TELEPHONE (OUTPATIENT)
Dept: PRIMARY CARE CLINIC | Age: 56
End: 2021-04-28

## 2021-04-28 DIAGNOSIS — J45.40 MODERATE PERSISTENT ASTHMA, UNSPECIFIED WHETHER COMPLICATED: Primary | ICD-10-CM

## 2021-04-28 RX ORDER — ALBUTEROL SULFATE 90 UG/1
2 AEROSOL, METERED RESPIRATORY (INHALATION) EVERY 6 HOURS PRN
Qty: 3 INHALER | Refills: 3 | Status: SHIPPED | OUTPATIENT
Start: 2021-04-28 | End: 2022-04-20 | Stop reason: SDUPTHER

## 2021-04-28 NOTE — TELEPHONE ENCOUNTER
Ventolina HFA inhaler not covered. Preferred covered alt is Albuterol.   Please change if ok and send to [facility]

## 2021-05-12 ENCOUNTER — HOSPITAL ENCOUNTER (OUTPATIENT)
Dept: NEUROLOGY | Age: 56
Discharge: HOME | End: 2021-05-12
Payer: COMMERCIAL

## 2021-05-12 DIAGNOSIS — G56.03 BILATERAL CARPAL TUNNEL SYNDROME: ICD-10-CM

## 2021-05-12 PROCEDURE — 95911 NRV CNDJ TEST 9-10 STUDIES: CPT

## 2021-05-12 PROCEDURE — 95886 MUSC TEST DONE W/N TEST COMP: CPT | Performed by: PSYCHIATRY & NEUROLOGY

## 2021-05-12 PROCEDURE — 95913 NRV CNDJ TEST 13/> STUDIES: CPT | Performed by: PSYCHIATRY & NEUROLOGY

## 2021-05-12 PROCEDURE — 95886 MUSC TEST DONE W/N TEST COMP: CPT

## 2021-05-12 NOTE — PROCEDURES
velocity. The left ulnar mixed orthodromic study  shows normal peak latencies, normal amplitudes, and normal conduction  velocity. Radial sensory nerve conduction studies are normal.  Needle  electrode examination of the above-sampled muscles shows decreased  activated units in the right abductor pollicis brevis muscle and the  left abductor pollicis brevis muscle. IMPRESSION:  Moderate bilateral median nerve neuropathies at the wrists  suggesting carpal tunnel syndrome. These findings are based on  prolonged latencies seen both in the antidromic and orthodromic sensory  nerve recordings. Surgical decompressions may be considered. The  patient has a subtle nonlocalized left ulnar neuropathy. This does not  though show any denervation or conduction blocks. She is mostly  asymptomatic. Multiple sensory nerve conduction studies are evaluated to rule out an  artifact or temperature differences. This test is personally performed  and interpreted by me. Findings were discussed with the patient. Thank you Dr. Jae Holbrook for allowing us to participate in the care of the  patient.         Tobi Haji MD    D: 05/12/2021 11:24:33       T: 05/12/2021 11:32:04     [id number]  Job#: [id number]     Doc#: [id number]    CC:

## 2021-05-17 ENCOUNTER — TELEPHONE (OUTPATIENT)
Dept: PRIMARY CARE CLINIC | Age: 56
End: 2021-05-17

## 2021-05-17 DIAGNOSIS — G56.03 CARPAL TUNNEL SYNDROME, BILATERAL: Primary | ICD-10-CM

## 2021-06-14 ENCOUNTER — TELEPHONE (OUTPATIENT)
Dept: PRIMARY CARE CLINIC | Age: 56
End: 2021-06-14

## 2021-06-14 DIAGNOSIS — L03.019 CELLULITIS OF FINGER, UNSPECIFIED LATERALITY: Primary | ICD-10-CM

## 2021-06-14 NOTE — TELEPHONE ENCOUNTER
Wants to know if he can have mupirocion oint 2% called in. Has had this in the past. Gets frequent infections on index finger under nail bed and this helps.

## 2021-06-15 ENCOUNTER — TELEPHONE (OUTPATIENT)
Dept: PRIMARY CARE CLINIC | Age: 56
End: 2021-06-15

## 2021-06-15 DIAGNOSIS — G56.03 CARPAL TUNNEL SYNDROME, BILATERAL: Primary | ICD-10-CM

## 2021-06-15 NOTE — TELEPHONE ENCOUNTER
Pt needs new referral to [facility]) for carpal tunnel. He was originally referred to Dr. Darin Cummings (per suggestion) and he is not in pt network nor do they accept his insurance. He is requesting someone experienced with hands as his occupation depends on it. He is asking for as soon as possible since he has been trying to get this taken care of since EMG done in May.

## 2021-07-12 ENCOUNTER — OFFICE VISIT (OUTPATIENT)
Dept: ORTHOPEDIC SURGERY | Age: 56
End: 2021-07-12
Payer: COMMERCIAL

## 2021-07-12 VITALS
OXYGEN SATURATION: 99 % | HEART RATE: 98 BPM | TEMPERATURE: 97.6 F | WEIGHT: 196 LBS | BODY MASS INDEX: 27.44 KG/M2 | HEIGHT: 71 IN

## 2021-07-12 DIAGNOSIS — G56.03 CARPAL TUNNEL SYNDROME ON BOTH SIDES: Primary | ICD-10-CM

## 2021-07-12 PROCEDURE — 99204 OFFICE O/P NEW MOD 45 MIN: CPT | Performed by: ORTHOPAEDIC SURGERY

## 2021-07-12 NOTE — PROGRESS NOTES
Subjective:      Patient ID: Bran Price is a 60 y.o. male who presents today for:  Chief Complaint   Patient presents with    Carpal Tunnel     NP-bilateral carpal tunnel, EMG 5/12, says that results show right worse than left, pt says that his hand hurts more in the right hand but hand is more numb in the left, he also has numbing in the left, pt is right hand dominant; no surgical hx on either hand; referred by Dr. 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 Patient states that his right hand is significantly worse than his left. He notes some numbness and tingling in his left hand without associated pain. In regards to his right hand. Patient states that the pain is essentially present most if not all of the time and is keeping him up at night. Patient was evaluated by his primary care physician and EMG was obtained with diagnosis of bilateral carpal tunnel syndrome right significantly worse than left.     Past Medical History:   Diagnosis Date    Anal fistula     Arthritis     Asthma     Eczema     steroid christina work    GERD (gastroesophageal reflux disease)     Plantar fasciitis, bilateral     insoles work    Smoker     quit 2007      Past Surgical History:   Procedure Laterality Date    LA I&D RECTAL SUBMUCOSAL ABSCESS N/A 2/13/2018    INCISION AND DRAINAGE PERIRECTAL RECTAL ABSCESS performed by Tatyana Churchill MD at [facility]     dislocation      Social History     Socioeconomic History    Marital status: Single     Spouse name: Not on file    Number of children: Not on file    Years of education: Not on file    Highest education level: Not on file   Occupational History    Not on file   Tobacco Use    Smoking status: Former Smoker     Packs/day: 1.50     Years: 26.00     Pack years: 39.00     Quit date: 7/11/2006     Years since quitting: 15.0    Smokeless tobacco: Never Used Vaping Use    Vaping Use: Never used   Substance and Sexual Activity    Alcohol use: No    Drug use: No    Sexual activity: Never   Other Topics Concern    Not on file   Social History Narrative    Not on file     Social Determinants of Health     Financial Resource Strain: Low Risk     Difficulty of Paying Living Expenses: Not hard at all   Food Insecurity: No Food Insecurity    Worried About Running Out of Food in the Last Year: Never true    [address] in the Last Year: Never true   Transportation Needs:     Lack of Transportation (Medical):  Lack of Transportation (Non-Medical):    Physical Activity:     Days of Exercise per Week:     Minutes of Exercise per Session:    Stress:     Feeling of Stress :    Social Connections:     Frequency of Communication with Friends and Family:     Frequency of Social Gatherings with Friends and Family:     Attends Church Services:     Active Member of Clubs or Organizations:     Attends Club or Organization Meetings:     Marital Status:    Intimate Partner Violence:     Fear of Current or Ex-Partner:     Emotionally Abused:     Physically Abused:     Sexually Abused:      No family history on file.   Allergies   Allergen Reactions    Amoxicillin     Clindamycin/Lincomycin      Current Outpatient Medications on File Prior to Visit   Medication Sig Dispense Refill    albuterol sulfate  (90 Base) MCG/ACT inhaler Inhale 2 puffs into the lungs every 6 hours as needed for Wheezing 3 Inhaler 3    fluticasone-salmeterol (ADVAIR) 250-50 MCG/DOSE AEPB Inhale 1 puff into the lungs every 12 hours 3 each 3    albuterol sulfate HFA (VENTOLIN HFA) 108 (90 Base) MCG/ACT inhaler Inhale 2 puffs into the lungs 4 times daily as needed for Wheezing 3 Inhaler 3    pantoprazole (PROTONIX) 40 MG tablet Take 1 tablet by mouth daily 90 tablet 3    SYMBICORT 160-4.5 MCG/ACT AERO Inhale 2 puffs into the lungs 2 times daily 3 Inhaler 3    triamcinolone (KENALOG) 0.1 % cream Apply a thin film to the affected area twice daily until resolved. 240 g 3    fluticasone (FLONASE) 50 MCG/ACT nasal spray 1 spray by Each Nare route daily 3 Bottle 3    cetirizine (ZYRTEC ALLERGY) 10 MG tablet Take 10 mg by mouth      acetaminophen (TYLENOL) 325 MG tablet Take 650 mg by mouth every 6 hours as needed for Pain      aspirin 81 MG tablet Take 81 mg by mouth daily      Multiple Vitamins-Minerals (THERAPEUTIC MULTIVITAMIN-MINERALS) tablet Take 1 tablet by mouth daily      Omega-3 Fatty Acids (FISH OIL) 1000 MG CPDR Take 1,000 mg by mouth daily       No current facility-administered medications on file prior to visit. Review of Systems  General: Denies fever, chills  Cardiovascular: Denies chest pain  Pulmonary: Denies shortness of breath  GI: Denies nausea or vomiting  Neuro: Denies numbness or tingling except as documented in HPI    Objective:   Pulse 98   Temp 97.6 °F (36.4 °C) (Temporal)   Ht 5' 10.5\" (1.791 m)   Wt 196 lb (88.9 kg) Comment: pt refused but says that this was his last weight  SpO2 99%   BMI 27.73 kg/m²     Ortho Exam  General: Well-appearing male who appears their stated age  Right upper extremity:  Skin: Intact circumferentially, no swelling, ecchymosis or edema is appreciated  Neuro: Sensation intact light touch in the radial and ulnar nerve distribution. Able to perform all cardinal hand movements. There is decrease sensation appreciated in the median nerve distribution. There is mild increased painful symptoms with Alie's compression test as well as prolonged flexion at the wrist.  Negative Tinel's sign at the wrist volarly. Negative Tinel's sign proximally on the proximal volar forearm or the cubital tunnel  Vascular: Strong palpable radial pulse, brisk cap refill in all digits. MSK: Thenar atrophy is present. Mild decreased  strength compared to contralateral side.     Left upper extremity:  Skin: Intact circumferentially, no swelling, ecchymosis or edema is appreciated  Neuro: Sensation intact light touch in the radial and ulnar nerve distribution. Able to perform all cardinal hand movements. There is decrease sensation appreciated in the median nerve distribution. There is no increased painful symptoms with Alie's compression test for prolonged flexion at the wrist.  Negative Tinel's sign at the wrist volarly. Negative Tinel's sign proximally on the proximal volar forearm or the cubital tunnel  Vascular: Strong palpable radial pulse, brisk cap refill in all digits. MSK: Thenar atrophy is absent. Radiographs and Laboratory Studies:     Diagnostic Studies:      EMG demonstrates bilateral medial nerve neuropathies located at the carpal tunnel    Laboratory Studies:   Lab Results   Component Value Date    WBC 6.1 08/17/2020    HGB 16.7 08/17/2020    HCT 49.0 08/17/2020    MCV 84.9 08/17/2020     08/17/2020     No results found for: SEDRATE  No results found for: CRP    Assessment:      Carpal tunnel syndrome bilateral wrists right worse than left     Plan:     Discussion was had with patient regarding treatment options including bracing and steroid injections of the carpal tunnel. Patient states that he has been dealing with this for so long he wants to proceed with surgical intervention. Risks and benefits of right carpal tunnel release were discussed with patient including not limited to damage to nerves, tendons, vessels, persistent pain, persistent numbness, unpredictable return of sensation and strength, infection, need for revision surgery with goals of surgery to remove compressive structure about the median nerve and allow for maximal functional recovery of the median nerve and improved pain relief. With knowledge of these risk and benefits patient is electing to proceed.     Morales Ramey MD           Surgery Phone: [phone]   [facility]   Surgery Fax: [phone]    Phone: ______________________________________________________    Physician Signature Required:    Date/Time: 7/12/2021

## 2021-07-13 ENCOUNTER — TELEPHONE (OUTPATIENT)
Dept: ORTHOPEDIC SURGERY | Age: 56
End: 2021-07-13

## 2021-07-13 NOTE — TELEPHONE ENCOUNTER
Lm Yadav from [facility] tried contacting patient to discuss surgery with Dr. Desmond Sims. Julia EBAR on patients VM to call office. PRE-OP: 7/26/2021 @10am @[address] shan [place]      Post-op: 8/16/2021 @10am Dr. Tiffany Birch. Please make patient aware when he contacts office.

## 2021-07-13 NOTE — TELEPHONE ENCOUNTER
Saurav Cabrera did a prior approval for Dr. Ivette Ordaz for surgery date 08/02/2021    Insurance approved CPT code 25047  REF# [id number]

## 2021-07-26 ENCOUNTER — OFFICE VISIT (OUTPATIENT)
Dept: FAMILY MEDICINE CLINIC | Age: 56
End: 2021-07-26
Payer: COMMERCIAL

## 2021-07-26 VITALS
HEIGHT: 71 IN | HEART RATE: 84 BPM | SYSTOLIC BLOOD PRESSURE: 132 MMHG | BODY MASS INDEX: 26.88 KG/M2 | WEIGHT: 192 LBS | OXYGEN SATURATION: 98 % | DIASTOLIC BLOOD PRESSURE: 88 MMHG | TEMPERATURE: 97.6 F

## 2021-07-26 DIAGNOSIS — Z01.818 PRE-OP EXAMINATION: ICD-10-CM

## 2021-07-26 DIAGNOSIS — Z01.818 PRE-OP EXAMINATION: Primary | ICD-10-CM

## 2021-07-26 PROBLEM — M79.645 PAIN IN LEFT FINGER(S): Status: ACTIVE | Noted: 2017-07-25

## 2021-07-26 PROBLEM — L03.012 CELLULITIS OF LEFT FINGER: Status: ACTIVE | Noted: 2017-07-25

## 2021-07-26 PROBLEM — Z76.5 MALINGERER (CONSCIOUS SIMULATION): Status: ACTIVE | Noted: 2017-07-25

## 2021-07-26 PROBLEM — K62.5 HEMORRHAGE OF ANUS AND RECTUM: Status: ACTIVE | Noted: 2018-02-13

## 2021-07-26 LAB
HCT VFR BLD CALC: 46.9 % (ref 42–52)
HEMOGLOBIN: 16.2 G/DL (ref 14–18)
MCH RBC QN AUTO: 29.3 PG (ref 27–31.3)
MCHC RBC AUTO-ENTMCNC: 34.6 % (ref 33–37)
MCV RBC AUTO: 84.9 FL (ref 80–100)
PDW BLD-RTO: 13.6 % (ref 11.5–14.5)
PLATELET # BLD: 280 K/UL (ref 130–400)
RBC # BLD: 5.53 M/UL (ref 4.7–6.1)
WBC # BLD: 6.9 K/UL (ref 4.8–10.8)

## 2021-07-26 PROCEDURE — 93000 ELECTROCARDIOGRAM COMPLETE: CPT | Performed by: NURSE PRACTITIONER

## 2021-07-26 PROCEDURE — 99243 OFF/OP CNSLTJ NEW/EST LOW 30: CPT | Performed by: NURSE PRACTITIONER

## 2021-07-26 RX ORDER — MULTIVIT WITH MINERALS/LUTEIN
250 TABLET ORAL DAILY
COMMUNITY

## 2021-07-26 RX ORDER — B-COMPLEX WITH VITAMIN C
TABLET ORAL DAILY
COMMUNITY

## 2021-07-26 NOTE — PROGRESS NOTES
Preoperative Consultation      Arturo Bloom  YOB: 1965    Date of Service:  7/26/2021     Vitals:    07/26/21 0959   BP: 132/88   Site: Right Upper Arm   Position: Sitting   Pulse: 84   Temp: 97.6 °F (36.4 °C)   TempSrc: Temporal   SpO2: 98%   Weight: 192 lb (87.1 kg)   Height: 5' 10.5\" (1.791 m)      Wt Readings from Last 2 Encounters:   07/26/21 192 lb (87.1 kg)   07/12/21 196 lb (88.9 kg)     BP Readings from Last 3 Encounters:   07/26/21 132/88   04/19/21 122/80   08/24/20 (!) 138/98        Chief Complaint   Patient presents with   Andree Mandel Pre-op Exam     Patient here for      Allergies   Allergen Reactions    Amoxicillin Diarrhea     Stomach problems     Clindamycin/Lincomycin Diarrhea     Stomach problmes     Outpatient Medications Marked as Taking for the 7/26/21 encounter (Office Visit) with Shauna Lopez. True Coelho, APRN - CNP   Medication Sig Dispense Refill    Ascorbic Acid (VITAMIN C) 250 MG tablet Take 250 mg by mouth daily      Zinc 100 MG TABS Take by mouth daily      albuterol sulfate  (90 Base) MCG/ACT inhaler Inhale 2 puffs into the lungs every 6 hours as needed for Wheezing 3 Inhaler 3    fluticasone-salmeterol (ADVAIR) 250-50 MCG/DOSE AEPB Inhale 1 puff into the lungs every 12 hours 3 each 3    pantoprazole (PROTONIX) 40 MG tablet Take 1 tablet by mouth daily 90 tablet 3    triamcinolone (KENALOG) 0.1 % cream Apply a thin film to the affected area twice daily until resolved.  240 g 3    fluticasone (FLONASE) 50 MCG/ACT nasal spray 1 spray by Each Nare route daily 3 Bottle 3    cetirizine (ZYRTEC ALLERGY) 10 MG tablet Take 10 mg by mouth daily       acetaminophen (TYLENOL) 325 MG tablet Take 650 mg by mouth every 6 hours as needed for Pain      aspirin 81 MG tablet Take 81 mg by mouth daily      Multiple Vitamins-Minerals (THERAPEUTIC MULTIVITAMIN-MINERALS) tablet Take 1 tablet by mouth daily      Omega-3 Fatty Acids (FISH OIL) 1000 MG CPDR Take 1,000 mg by mouth daily         This patient presents to the office today for a preoperative consultation at the request of surgeon, Dr. Maddie Fernandez, who plans on performing Right open Carpal tunnel release on August 2 at [facility]. The current problem began 2 years ago, and symptoms have been worsening with time. Conservative therapy: Yes: splints, which has been ineffective. Planned anesthesia: Berny Block   Known anesthesia problems: PONV   Bleeding risk: No recent or remote history of abnormal bleeding  Personal or FH of DVT/PE: No    Patient objection to receiving blood products: No    Patient Active Problem List   Diagnosis    Periradicular pathology associated with previous endodontic treatment    Acquired equinus deformity of both feet    Allergic rhinitis due to pollen    Calcaneal spur    Constipation    Depressive disorder, not elsewhere classified    Dermatitis    Dermatofibroma of right thigh    Dermatophytosis of foot    Generalized anxiety disorder    Idiopathic urticaria    Mixed hyperlipidemia    Moderate persistent asthma    Obsessive-compulsive disorder    Other seborrheic keratosis    Plantar fasciitis, left    Reflux esophagitis    Umbilical hernia    Perirectal abscess    Bilateral carpal tunnel syndrome    Pain in left finger(s)    Malingerer (conscious simulation)    Hemorrhage of anus and rectum    Cellulitis of left finger       Past Medical History:   Diagnosis Date    Anal fistula     Arthritis     Asthma     Eczema     steroid christina work    GERD (gastroesophageal reflux disease)     Plantar fasciitis, bilateral     insoles work    Smoker     quit 2007     Past Surgical History:   Procedure Laterality Date    NC I&D RECTAL SUBMUCOSAL ABSCESS N/A 2/13/2018    INCISION AND DRAINAGE PERIRECTAL RECTAL ABSCESS performed by Tyler aVz MD at [facility] Right     dislocation      History reviewed. No pertinent family history.   Social History     Socioeconomic History  Marital status: Single     Spouse name: Not on file    Number of children: Not on file    Years of education: Not on file    Highest education level: Not on file   Occupational History    Not on file   Tobacco Use    Smoking status: Former Smoker     Packs/day: 1.50     Years: 26.00     Pack years: 39.00     Quit date: 7/11/2006     Years since quitting: 15.0    Smokeless tobacco: Never Used   Vaping Use    Vaping Use: Never used   Substance and Sexual Activity    Alcohol use: No    Drug use: No    Sexual activity: Never   Other Topics Concern    Not on file   Social History Narrative    Not on file     Social Determinants of Health     Financial Resource Strain: Low Risk     Difficulty of Paying Living Expenses: Not hard at all   Food Insecurity: No Food Insecurity    Worried About [address] in the Last Year: Never true    [address] in the Last Year: Never true   Transportation Needs:     Lack of Transportation (Medical):  Lack of Transportation (Non-Medical):    Physical Activity:     Days of Exercise per Week:     Minutes of Exercise per Session:    Stress:     Feeling of Stress :    Social Connections:     Frequency of Communication with Friends and Family:     Frequency of Social Gatherings with Friends and Family:     Attends Taoism Services:     Active Member of Clubs or Organizations:     Attends Club or Organization Meetings:     Marital Status:    Intimate Partner Violence:     Fear of Current or Ex-Partner:     Emotionally Abused:     Physically Abused:     Sexually Abused:        Review of Systems  A comprehensive review of systems was negative except for what was noted in the HPI. Physical Exam   Constitutional: He is oriented to person, place, and time. He appears well-developed and well-nourished. No distress. HENT:   Head: Normocephalic and atraumatic.    Mouth/Throat: Uvula is midline, oropharynx is clear and moist and mucous membranes are normal.   Eyes: Conjunctivae and EOM are normal. Pupils are equal, round, and reactive to light. Neck: Trachea normal and normal range of motion. Neck supple. No JVD present. Carotid bruit is not present. No mass and no thyromegaly present. Cardiovascular: Normal rate, regular rhythm, normal heart sounds and intact distal pulses. Exam reveals no gallop and no friction rub. No murmur heard. Pulmonary/Chest: Effort normal and breath sounds normal. No respiratory distress. He has no wheezes. He has no rales. Abdominal: Soft. Normal aorta and bowel sounds are normal. He exhibits no distension and no mass. There is no hepatosplenomegaly. No tenderness. Musculoskeletal: He exhibits no edema and no tenderness. Neurological: He is alert and oriented to person, place, and time. He has normal strength. No cranial nerve deficit or sensory deficit. Coordination and gait normal.   Skin: Skin is warm and dry. No rash noted. No erythema. Psychiatric: He has a normal mood and affect. His behavior is normal.     EKG Interpretation:  normal sinus rhythm, difficult to get a good read on EKG due to lotion. Lab Review labs ordered        Assessment:       60 y.o. patient with planned surgery as above. Known risk factors for perioperative complications: Asthma  Current medications which may produce withdrawal symptoms if withheld perioperatively: none      Plan:     1. Preoperative workup as follows: ECG, hemoglobin, hematocrit,   2. Change in medication regimen before surgery: Hold all medications on morning of surgery, albuterol and advair use   3.  Prophylaxis for cardiac events with perioperative beta-blockers: Not indicated  ACC/AHA indications for pre-operative beta-blocker use:    · Vascular surgery with history of postitive stress test  · Intermediate or high risk surgery with history of CAD   · Intermediate or high risk surgery with multiple clinical predictors of CAD- 2 of the following: history of

## 2021-07-26 NOTE — PATIENT INSTRUCTIONS
Patient Education        Carpal Tunnel Release: Before Your Surgery  What is carpal tunnel release? Carpal tunnel surgery reduces the pressure on a nerve in the wrist. Your doctor will cut a ligament that presses on the nerve. This lets the nerve pass freely through the tunnel without being squeezed. This is also called carpal tunnel release surgery. The surgery can be open or endoscopic. In open surgery, your doctor makes a small cut in the palm of your hand. This cut is called an incision. In endoscopic surgery, your doctor makes one small incision in the wrist. Or you may have one small incision in the wrist and one in the palm. Your doctor puts a thin tube with a camera attached (endoscope) into the incision. Surgical tools are put in along with the endoscope. In both types of surgeries, the incisions are closed with stitches. The incisions leave scars that usually fade in time. You may be asleep during the surgery. Or you may be awake and have medicine to numb your hand and arm so you won't feel pain. After surgery, your wrist and hand pain should start to go away. It usually takes 3 to 4 months to recover and 1 year before your hand strength returns. How much hand strength returns is different for each person. You will go home the same day as the surgery. When you can go back to work depends on the type of work you do. Follow-up care is a key part of your treatment and safety. Be sure to make and go to all appointments, and call your doctor if you are having problems. It's also a good idea to know your test results and keep a list of the medicines you take. How do you prepare for surgery? Surgery can be stressful. This information will help you understand what you can expect. And it will help you safely prepare for surgery. Preparing for surgery    · Be sure you have someone to take you home.  Anesthesia and pain medicine will make it unsafe for you to drive or get home on your own.     · Understand exactly what surgery is planned, along with the risks, benefits, and other options.     · Tell your doctor ALL the medicines, vitamins, supplements, and herbal remedies you take. Some may increase the risk of problems during your surgery. Your doctor will tell you if you should stop taking any of them before the surgery and how soon to do it.     · If you take aspirin or some other blood thinner, ask your doctor if you should stop taking it before your surgery. Make sure that you understand exactly what your doctor wants you to do. These medicines increase the risk of bleeding.     · Make sure your doctor and the hospital have a copy of your advance directive. If you don't have one, you may want to prepare one. It lets others know your health care wishes. It's a good thing to have before any type of surgery or procedure. What happens on the day of surgery? · Follow the instructions exactly about when to stop eating and drinking. If you don't, your surgery may be canceled. If your doctor told you to take your medicines on the day of surgery, take them with only a sip of water.     · Take a bath or shower before you come in for your surgery. Do not apply lotions, perfumes, deodorants, or nail polish.     · Do not shave the surgical site yourself.     · Take off all jewelry and piercings. And take out contact lenses, if you wear them. At the hospital or surgery center   · Bring a picture ID.     · The area for surgery is often marked to make sure there are no errors.     · You will be kept comfortable and safe by your anesthesia provider. The anesthesia may make you sleep. Or it may just numb the area being worked on.     · The surgery will take about 15 to 60 minutes. When should you call your doctor?    · You have questions or concerns.     · You don't understand how to prepare for your surgery.     · You become ill before the surgery (such as fever, flu, or a cold).     · You need to reschedule or have changed your mind about having the surgery. Where can you learn more? Go to [email or web address] and sign in to your [employer] account. Enter N012 in the [facility] box to learn more about \"Carpal Tunnel Release: Before Your Surgery. \"     If you do not have an account, please click on the \"Sign Up Now\" link. Current as of: November 16, 2020               Content Version: 12.9  © 2006-2021 [employer]. Care instructions adapted under license by [facility] ([facility]). If you have questions about a medical condition or this instruction, always ask your healthcare professional. [address] any warranty or liability for your use of this information. Patient Education        Carpal Tunnel Release: What to Expect at [facility] tunnel reduces the pressure on a nerve in the wrist. Your doctor cut a ligament that presses on the nerve. This lets the nerve pass freely through the tunnel without being squeezed. Your hand will hurt and may feel weak with some numbness. This usually goes away in a few days, but it may take several months. Your doctor may remove the large bandage, or he or she will tell you when and how to remove it yourself. In some cases, you may have a splint. If you have one, you will wear it for about 2 weeks. Your doctor will take out your stitches in 1 to 2 weeks. Your hand and wrist may feel worse than they used to feel. But the pain should start to go away. It usually takes 3 to 4 months to recover and up to 1 year before hand strength returns. How much strength returns will vary. The timing of your return to work depends on the type of surgery you had, whether the surgery was on your dominant hand (the hand you use most), and your work activities. If you had open surgery on your dominant hand and you do repeated actions at work, you may be able to go back to work in 10 to 8 weeks.  Repeated motions include typing or assembly-line work. If the surgery was on the other hand and you don't do repeated actions at work, you may be able to return to work in 9 to 14 days. If you had endoscopic surgery, you may be able to go back to work sooner than with open surgery. This care sheet gives you a general idea about how long it will take for you to recover. But each person recovers at a different pace. Follow the steps below to get better as quickly as possible. How can you care for yourself at home? Activity    · Rest when you feel tired. Getting enough sleep will help you recover.     · Try to walk each day. Start by walking a little more than you did the day before. Bit by bit, increase the amount you walk.     · For up to 2 weeks after surgery, avoid lifting things heavier than 1 to 2 pounds and using your hand. This includes doing repeated arm or hand movements, such as typing or using a computer mouse, washing windows, vacuuming, or chopping food. Do not use power tools, and avoid activities that cause vibration.     · You may do heavier tasks about 4 weeks after surgery. These include vacuuming, mowing the lawn, and gardening.     · You may shower 24 to 48 hours after surgery, if your doctor okays it. Keep your bandage dry by taping a sheet of plastic to cover it. If you have a splint, keep it dry. Your doctor will tell you if you can remove it when you shower. Be careful not to put the splint on too tight. Do not take a bath until the incision heals, or until your doctor tells you it is okay.     · You may drive when you are fully able to use your hand. Diet    · You can eat your normal diet. If your stomach is upset, try bland, low-fat foods like plain rice, broiled chicken, toast, and yogurt. Medicines    · Your doctor will tell you if and when you can restart your medicines.  He or she will also give you instructions about taking any new medicines.     · If you take aspirin or some other blood thinner, ask your doctor if and when to start taking it again. Make sure that you understand exactly what your doctor wants you to do.     · Take pain medicines exactly as directed. ? If the doctor gave you a prescription medicine for pain, take it as prescribed. ? If you are not taking a prescription pain medicine, take an over-the-counter medicine such as acetaminophen (Tylenol), ibuprofen (Advil, Motrin), or naproxen (Aleve). Read and follow all instructions on the label. ? Do not take two or more pain medicines at the same time unless the doctor told you to. Many pain medicines have acetaminophen, which is Tylenol. Too much acetaminophen (Tylenol) can be harmful.     · If you think your pain medicine is making you sick to your stomach:  ? Take your medicine after meals (unless your doctor has told you not to). ? Ask your doctor for a different pain medicine.     · If your doctor prescribed antibiotics, take them as directed. Do not stop taking them just because you feel better. You need to take the full course of antibiotics. Incision and splint care    · Keep your bandage dry. If it gets dirty, you may change it.     · If you have a splint, talk to your doctor about when you should wear it. Exercise    · You may need wrist and hand rehabilitation. This is a series of exercises you do after your surgery. This helps you get back your wrist's and hand's range of motion, strength, and . You will work with your doctor and physical or occupational therapist to plan this exercise program. To get the best results, you need to do the exercises correctly and as often and as long as your doctor tells you. Ice and elevation    · Put ice or a cold pack on your wrist for 10 to 20 minutes at a time. Try to do this every 1 to 2 hours for the next 3 days (when you are awake) or until the swelling goes down.  Put a thin cloth between the ice and your skin.     · Prop up the sore wrist on a pillow when you ice it or anytime you sit or lie down during the next 3 days. Try to keep it above the level of your heart. This will help reduce swelling. Other instructions    · Avoid letting your hand hang down. This can cause swelling. Follow-up care is a key part of your treatment and safety. Be sure to make and go to all appointments, and call your doctor if you are having problems. It's also a good idea to know your test results and keep a list of the medicines you take. When should you call for help? Call 911 anytime you think you may need emergency care. For example, call if:    · You passed out (lost consciousness).     · You have chest pain, are short of breath, or cough up blood. Call your doctor now or seek immediate medical care if:    · You have pain that does not get better after you take pain medicine.     · Your hand is cool or pale or changes color.     · Your cast or splint feels too tight.     · You have tingling, weakness, or numbness in your hand or fingers.     · You are sick to your stomach or cannot drink fluids.     · You have loose stitches, or your incision comes open.     · You have signs of a blood clot in your leg (called a deep vein thrombosis), such as:  ? Pain in your calf, back of the knee, thigh, or groin. ? Redness or swelling in your leg.     · You have signs of infection, such as:  ? Increased pain, swelling, warmth, or redness. ? Red streaks leading from the incision. ? Pus draining from the incision. ? A fever.     · Bright red blood has soaked through the bandage over your incision. Watch closely for any changes in your health, and be sure to contact your doctor if:    · You have a problem with your cast or splint.     · You do not get better as expected. Where can you learn more? Go to https://[email or web address] org and sign in to your [employer] account. Enter N388 in the [facility] box to learn more about \"Carpal Tunnel Release: What to Expect at Home. \"     If you do not have an account, please click on the \"Sign Up Now\" link. Current as of: November 16, 2020               Content Version: 12.9  © 2006-2021 [employer]. Care instructions adapted under license by [facility] ([facility]). If you have questions about a medical condition or this instruction, always ask your healthcare professional. [address] any warranty or liability for your use of this information.

## 2021-07-27 LAB — SARS-COV-2, PCR: NOT DETECTED

## 2021-08-02 ENCOUNTER — ANESTHESIA (OUTPATIENT)
Dept: OPERATING ROOM | Age: 56
End: 2021-08-02
Payer: COMMERCIAL

## 2021-08-02 ENCOUNTER — ANESTHESIA EVENT (OUTPATIENT)
Dept: OPERATING ROOM | Age: 56
End: 2021-08-02
Payer: COMMERCIAL

## 2021-08-02 ENCOUNTER — HOSPITAL ENCOUNTER (OUTPATIENT)
Age: 56
Setting detail: OUTPATIENT SURGERY
Discharge: HOME | End: 2021-08-02
Attending: ORTHOPAEDIC SURGERY | Admitting: ORTHOPAEDIC SURGERY
Payer: COMMERCIAL

## 2021-08-02 VITALS
RESPIRATION RATE: 19 BRPM | HEIGHT: 71 IN | HEART RATE: 77 BPM | DIASTOLIC BLOOD PRESSURE: 76 MMHG | BODY MASS INDEX: 26.88 KG/M2 | WEIGHT: 192 LBS | SYSTOLIC BLOOD PRESSURE: 155 MMHG | TEMPERATURE: 97 F | OXYGEN SATURATION: 99 %

## 2021-08-02 VITALS — DIASTOLIC BLOOD PRESSURE: 79 MMHG | OXYGEN SATURATION: 97 % | SYSTOLIC BLOOD PRESSURE: 162 MMHG

## 2021-08-02 DIAGNOSIS — G56.01 CARPAL TUNNEL SYNDROME OF RIGHT WRIST: Primary | ICD-10-CM

## 2021-08-02 PROCEDURE — 2500000003 HC RX 250 WO HCPCS: Performed by: ORTHOPAEDIC SURGERY

## 2021-08-02 PROCEDURE — 2709999900 HC NON-CHARGEABLE SUPPLY: Performed by: ORTHOPAEDIC SURGERY

## 2021-08-02 PROCEDURE — 6370000000 HC RX 637 (ALT 250 FOR IP): Performed by: ANESTHESIOLOGY

## 2021-08-02 PROCEDURE — 2500000003 HC RX 250 WO HCPCS: Performed by: ANESTHESIOLOGY

## 2021-08-02 PROCEDURE — 2580000003 HC RX 258: Performed by: ANESTHESIOLOGY

## 2021-08-02 PROCEDURE — 2580000003 HC RX 258: Performed by: ORTHOPAEDIC SURGERY

## 2021-08-02 PROCEDURE — 3700000001 HC ADD 15 MINUTES (ANESTHESIA): Performed by: ORTHOPAEDIC SURGERY

## 2021-08-02 PROCEDURE — 3600000013 HC SURGERY LEVEL 3 ADDTL 15MIN: Performed by: ORTHOPAEDIC SURGERY

## 2021-08-02 PROCEDURE — 7100000011 HC PHASE II RECOVERY - ADDTL 15 MIN: Performed by: ORTHOPAEDIC SURGERY

## 2021-08-02 PROCEDURE — 3700000000 HC ANESTHESIA ATTENDED CARE: Performed by: ORTHOPAEDIC SURGERY

## 2021-08-02 PROCEDURE — 3600000003 HC SURGERY LEVEL 3 BASE: Performed by: ORTHOPAEDIC SURGERY

## 2021-08-02 PROCEDURE — 6360000002 HC RX W HCPCS: Performed by: ORTHOPAEDIC SURGERY

## 2021-08-02 PROCEDURE — 7100000010 HC PHASE II RECOVERY - FIRST 15 MIN: Performed by: ORTHOPAEDIC SURGERY

## 2021-08-02 PROCEDURE — 2720000010 HC SURG SUPPLY STERILE: Performed by: ORTHOPAEDIC SURGERY

## 2021-08-02 PROCEDURE — 64721 CARPAL TUNNEL SURGERY: CPT | Performed by: ORTHOPAEDIC SURGERY

## 2021-08-02 RX ORDER — HYDROCODONE BITARTRATE AND ACETAMINOPHEN 5; 325 MG/1; MG/1
1 TABLET ORAL EVERY 4 HOURS PRN
Qty: 21 TABLET | Refills: 0 | Status: SHIPPED | OUTPATIENT
Start: 2021-08-02 | End: 2021-08-09

## 2021-08-02 RX ORDER — SODIUM CHLORIDE, SODIUM LACTATE, POTASSIUM CHLORIDE, CALCIUM CHLORIDE 600; 310; 30; 20 MG/100ML; MG/100ML; MG/100ML; MG/100ML
INJECTION, SOLUTION INTRAVENOUS CONTINUOUS
Status: DISCONTINUED | OUTPATIENT
Start: 2021-08-02 | End: 2021-08-02 | Stop reason: HOSPADM

## 2021-08-02 RX ORDER — MAGNESIUM HYDROXIDE 1200 MG/15ML
LIQUID ORAL CONTINUOUS PRN
Status: COMPLETED | OUTPATIENT
Start: 2021-08-02 | End: 2021-08-02

## 2021-08-02 RX ORDER — 0.9 % SODIUM CHLORIDE 0.9 %
500 INTRAVENOUS SOLUTION INTRAVENOUS
Status: DISCONTINUED | OUTPATIENT
Start: 2021-08-02 | End: 2021-08-02 | Stop reason: HOSPADM

## 2021-08-02 RX ORDER — METOCLOPRAMIDE HYDROCHLORIDE 5 MG/ML
10 INJECTION INTRAMUSCULAR; INTRAVENOUS
Status: DISCONTINUED | OUTPATIENT
Start: 2021-08-02 | End: 2021-08-02 | Stop reason: HOSPADM

## 2021-08-02 RX ORDER — HYDROCODONE BITARTRATE AND ACETAMINOPHEN 5; 325 MG/1; MG/1
2 TABLET ORAL PRN
Status: COMPLETED | OUTPATIENT
Start: 2021-08-02 | End: 2021-08-02

## 2021-08-02 RX ORDER — ONDANSETRON 2 MG/ML
4 INJECTION INTRAMUSCULAR; INTRAVENOUS
Status: DISCONTINUED | OUTPATIENT
Start: 2021-08-02 | End: 2021-08-02 | Stop reason: HOSPADM

## 2021-08-02 RX ORDER — LIDOCAINE HYDROCHLORIDE 5 MG/ML
INJECTION, SOLUTION INFILTRATION; INTRAVENOUS PRN
Status: DISCONTINUED | OUTPATIENT
Start: 2021-08-02 | End: 2021-08-02 | Stop reason: SDUPTHER

## 2021-08-02 RX ORDER — PROPOFOL 10 MG/ML
INJECTION, EMULSION INTRAVENOUS CONTINUOUS PRN
Status: DISCONTINUED | OUTPATIENT
Start: 2021-08-02 | End: 2021-08-02 | Stop reason: SDUPTHER

## 2021-08-02 RX ORDER — DIPHENHYDRAMINE HYDROCHLORIDE 50 MG/ML
12.5 INJECTION INTRAMUSCULAR; INTRAVENOUS
Status: DISCONTINUED | OUTPATIENT
Start: 2021-08-02 | End: 2021-08-02 | Stop reason: HOSPADM

## 2021-08-02 RX ORDER — LIDOCAINE HYDROCHLORIDE 10 MG/ML
1 INJECTION, SOLUTION EPIDURAL; INFILTRATION; INTRACAUDAL; PERINEURAL
Status: COMPLETED | OUTPATIENT
Start: 2021-08-02 | End: 2021-08-02

## 2021-08-02 RX ORDER — LABETALOL HYDROCHLORIDE 5 MG/ML
5 INJECTION, SOLUTION INTRAVENOUS EVERY 10 MIN PRN
Status: DISCONTINUED | OUTPATIENT
Start: 2021-08-02 | End: 2021-08-02 | Stop reason: HOSPADM

## 2021-08-02 RX ORDER — BUPIVACAINE HYDROCHLORIDE 5 MG/ML
INJECTION, SOLUTION EPIDURAL; INTRACAUDAL PRN
Status: DISCONTINUED | OUTPATIENT
Start: 2021-08-02 | End: 2021-08-02 | Stop reason: ALTCHOICE

## 2021-08-02 RX ORDER — HYDROCODONE BITARTRATE AND ACETAMINOPHEN 5; 325 MG/1; MG/1
1 TABLET ORAL PRN
Status: COMPLETED | OUTPATIENT
Start: 2021-08-02 | End: 2021-08-02

## 2021-08-02 RX ORDER — FENTANYL CITRATE 50 UG/ML
25 INJECTION, SOLUTION INTRAMUSCULAR; INTRAVENOUS EVERY 5 MIN PRN
Status: DISCONTINUED | OUTPATIENT
Start: 2021-08-02 | End: 2021-08-02 | Stop reason: HOSPADM

## 2021-08-02 RX ADMIN — HYDROCODONE BITARTRATE AND ACETAMINOPHEN 2 TABLET: 5; 325 TABLET ORAL at 13:43

## 2021-08-02 RX ADMIN — SODIUM CHLORIDE, POTASSIUM CHLORIDE, SODIUM LACTATE AND CALCIUM CHLORIDE: 600; 310; 30; 20 INJECTION, SOLUTION INTRAVENOUS at 11:51

## 2021-08-02 RX ADMIN — PROPOFOL 150 MCG/KG/MIN: 10 INJECTION, EMULSION INTRAVENOUS at 13:01

## 2021-08-02 RX ADMIN — CEFAZOLIN 2000 MG: 10 INJECTION, POWDER, FOR SOLUTION INTRAVENOUS at 13:09

## 2021-08-02 RX ADMIN — LIDOCAINE HYDROCHLORIDE 45 ML: 5 INJECTION, SOLUTION INFILTRATION; INTRAVENOUS at 13:04

## 2021-08-02 RX ADMIN — LIDOCAINE HYDROCHLORIDE 0.1 ML: 10 INJECTION, SOLUTION EPIDURAL; INFILTRATION; INTRACAUDAL; PERINEURAL at 11:50

## 2021-08-02 ASSESSMENT — PULMONARY FUNCTION TESTS
PIF_VALUE: 1

## 2021-08-02 ASSESSMENT — PAIN SCALES - GENERAL
PAINLEVEL_OUTOF10: 5
PAINLEVEL_OUTOF10: 5
PAINLEVEL_OUTOF10: 8

## 2021-08-02 NOTE — OP NOTE
Operative Note      Patient: Brian Clemente  YOB: 1965  MRN: [id number]    Date of Procedure: 8/2/2021    Pre-Op Diagnosis: RIGHT CARPAL TUNNEL SYNDROME    Post-Op Diagnosis: Same       Procedure(s):  RIGHT OPEN CARPAL TUNNEL RELEASE    Surgeon(s):  Yvette Hughes MD    Assistant:   * No surgical staff found *    Anesthesia: Berny Block    Estimated Blood Loss (mL): Minimal    Complications: None    Specimens:   * No specimens in log *    Implants:  * No implants in log *      Drains: * No LDAs found *    Findings: Right carpal tunnel syndrome    Detailed Description of Procedure:   Patient was taken the operating room and placed supine on the operative table. The right uppper extremity extremity was placed onto the hand table. Tourniquet was applied to the operative extremity at the level of the arm. Timeout was performed, all parties identified the correct surgical site was noted. After exsanguination and inflation of the tourniquet, a [place] block was administered by anesthesia. After this was performed and adequate analgesia had been obtained, a standard volar approach to the transverse carpal ligament was utilized. Skin incision was localized between the thenar and hypothenar eminence. Dutton's line was noted for the distal extent of the release. An incision was not carried into the flexion crease of the wrist.  Dissection was carried down through skin and exposing palmar fascia. This was incised longitudinally in line with the incision. Self retainer was placed into the wound. The transverse carpal ligament was easily identified. This was released under direct visualization using 15 blade scalpel. Release was carried distally until the palmar fat was identified. I then turned my attention proximally and directly released as much as I could visualize until with a very small remnant of the transverse carpal ligament was present proximally.   This was subsequently then released with carpal tunnel blade with protective base. Small spreading with Metzenbaum scissors fully released the median nerve. Median nerve did show increased vascularity once transverse carpal ligament had been completely released. Wound was copiously irrigated with normal saline. Skin was closed with 4-0 Prolene suture. 10 cc of half percent Marcaine was applied proximally for regional block. A soft compressive dressing was applied. Tourniquet was deflated after closure and dressing application. Patient tolerated the procedure well. No complications occurred.     Electronically signed by Zeina Reyes MD on 8/2/2021 at 1:48 PM

## 2021-08-02 NOTE — H&P
History and physical reviewed from 7/26/2021 is correct without interval change. Plan to proceed with right open carpal tunnel release.

## 2021-08-02 NOTE — ANESTHESIA PRE PROCEDURE
Department of Anesthesiology  Preprocedure Note       Name:  Kim Dennis   Age:  60 y.o.  :  1965                                          MRN:  [id number]         Date:  2021      Surgeon: Jose Ramon Higgins):  Carmina Alberts MD    Procedure: Procedure(s):  RIGHT OPEN CARPAL TUNNEL RELEASE    Medications prior to admission:   Prior to Admission medications    Medication Sig Start Date End Date Taking? Authorizing Provider   Ascorbic Acid (VITAMIN C) 250 MG tablet Take 250 mg by mouth daily   Yes Historical Provider, MD   Zinc 100 MG TABS Take by mouth daily   Yes Historical Provider, MD   albuterol sulfate  (90 Base) MCG/ACT inhaler Inhale 2 puffs into the lungs every 6 hours as needed for Wheezing 21  Yes Ritu Balderas MD   fluticasone-salmeterol (ADVAIR) 250-50 MCG/DOSE AEPB Inhale 1 puff into the lungs every 12 hours 21  Yes Ritu Balderas MD   pantoprazole (PROTONIX) 40 MG tablet Take 1 tablet by mouth daily 20 Yes Ritu Balderas MD   cetirizine (ZYRTEC ALLERGY) 10 MG tablet Take 10 mg by mouth daily  13  Yes Historical Provider, MD   acetaminophen (TYLENOL) 325 MG tablet Take 650 mg by mouth every 6 hours as needed for Pain   Yes Historical Provider, MD   aspirin 81 MG tablet Take 81 mg by mouth daily   Yes Historical Provider, MD   Multiple Vitamins-Minerals (THERAPEUTIC MULTIVITAMIN-MINERALS) tablet Take 1 tablet by mouth daily   Yes Historical Provider, MD   Omega-3 Fatty Acids (FISH OIL) 1000 MG CPDR Take 1,000 mg by mouth daily   Yes Historical Provider, MD   triamcinolone (KENALOG) 0.1 % cream Apply a thin film to the affected area twice daily until resolved.  3/16/20   Ritu Balderas MD   fluticasone [facility]) 50 MCG/ACT nasal spray 1 spray by Each Nare route daily 19   Meghann Marc MD       Current medications:    Current Facility-Administered Medications   Medication Dose Route Frequency Provider Last Rate Last Admin    lactated ringers infusion Intravenous Continuous Ariadna Aguayo  mL/hr at 08/02/21 1151 New Bag at 08/02/21 1151     Facility-Administered Medications Ordered in Other Encounters   Medication Dose Route Frequency Provider Last Rate Last Admin    propofol injection   Intravenous Continuous PRN Radha Basurto MD 78.39 mL/hr at 08/02/21 1301 150 mcg/kg/min at 08/02/21 1301    lidocaine PF 0.5 % injection   Intravenous PRN Radha Basurto MD   45 mL at 08/02/21 1304       Allergies:     Allergies   Allergen Reactions    Amoxicillin Diarrhea     Stomach problems     Clindamycin/Lincomycin Diarrhea     Stomach problmes       Problem List:    Patient Active Problem List   Diagnosis Code    Periradicular pathology associated with previous endodontic treatment M27.59    Acquired equinus deformity of both feet M21.6X1, M21.6X2    Allergic rhinitis due to pollen J30.1    Calcaneal spur M77.30    Constipation K59.00    Depressive disorder, not elsewhere classified F32.9    Dermatitis L30.9    Dermatofibroma of right thigh D23.71    Dermatophytosis of foot B35.3    Generalized anxiety disorder F41.1    Idiopathic urticaria L50.1    Mixed hyperlipidemia E78.2    Moderate persistent asthma J45.40    Obsessive-compulsive disorder F42.9    Other seborrheic keratosis L82.1    Plantar fasciitis, left M72.2    Reflux esophagitis K62.10    Umbilical hernia L83.4    Perirectal abscess K61.1    Bilateral carpal tunnel syndrome G56.03    Pain in left finger(s) M79.645    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    Hemorrhage of anus and rectum K62.5    Cellulitis of left finger L03.012       Past Medical History:        Diagnosis Date    Anal fistula     Arthritis     Asthma     Eczema     steroid christina work    GERD (gastroesophageal reflux disease)     Plantar fasciitis, bilateral     insoles work    Smoker     quit 2007       Past Surgical History:        Procedure Laterality Date    [place] I&D RECTAL SUBMUCOSAL ABSCESS N/A 2/13/2018    INCISION AND DRAINAGE PERIRECTAL RECTAL ABSCESS performed by Andrei Pereyra MD at [address] Right     dislocation        Social History:    Social History     Tobacco Use    Smoking status: Former Smoker     Packs/day: 1.50     Years: 26.00     Pack years: 39.00     Quit date: 7/11/2006     Years since quitting: 15.0    Smokeless tobacco: Never Used   Substance Use Topics    Alcohol use: No                                Counseling given: Not Answered      Vital Signs (Current):   Vitals:    08/02/21 1058   BP: (!) 141/92   Pulse: 82   Resp: 16   Temp: 97.9 °F (36.6 °C)   TempSrc: Temporal   SpO2: 98%   Weight: 192 lb (87.1 kg)   Height: 5' 10.5\" (1.791 m)                                              BP Readings from Last 3 Encounters:   08/02/21 (!) 141/92   08/02/21 (!) 174/83   07/26/21 132/88       NPO Status: Time of last liquid consumption: 0000                        Time of last solid consumption: 2200                        Date of last liquid consumption: 08/02/21                        Date of last solid food consumption: 08/01/21    BMI:   Wt Readings from Last 3 Encounters:   08/02/21 192 lb (87.1 kg)   07/26/21 192 lb (87.1 kg)   07/12/21 196 lb (88.9 kg)     Body mass index is 27.16 kg/m².     CBC:   Lab Results   Component Value Date    WBC 6.9 07/26/2021    RBC 5.53 07/26/2021    HGB 16.2 07/26/2021    HCT 46.9 07/26/2021    MCV 84.9 07/26/2021    RDW 13.6 07/26/2021     07/26/2021       CMP:   Lab Results   Component Value Date     08/17/2020    K 4.4 08/17/2020     08/17/2020    CO2 30 08/17/2020    BUN 14 08/17/2020    CREATININE 0.68 08/17/2020    GFRAA >60.0 08/17/2020    LABGLOM >60.0 08/17/2020    GLUCOSE 105 08/17/2020    PROT 7.2 08/17/2020    CALCIUM 9.6 08/17/2020    BILITOT 0.6 08/17/2020    ALKPHOS 94 08/17/2020    AST 36 08/17/2020    ALT 37 08/17/2020       POC Tests: No results for input(s): POCGLU, POCNA, POCK, POCCL, POCBUN, Joselin Rigginsvert in the last 72 hours. Coags: No results found for: PROTIME, INR, APTT    HCG (If Applicable): No results found for: PREGTESTUR, PREGSERUM, HCG, HCGQUANT     ABGs: No results found for: PHART, PO2ART, GSL2WEX, DUF6JOS, BEART, B5SAKHCR     Type & Screen (If Applicable):  No results found for: LABABO, LABRH    Drug/Infectious Status (If Applicable):  No results found for: HIV, HEPCAB    COVID-19 Screening (If Applicable):   Lab Results   Component Value Date    COVID19 Not Detected 07/26/2021           Anesthesia Evaluation  Patient summary reviewed and Nursing notes reviewed no history of anesthetic complications:   Airway: Mallampati: II  TM distance: >3 FB   Neck ROM: full  Mouth opening: > = 3 FB Dental: normal exam         Pulmonary:Negative Pulmonary ROS and normal exam    (+) asthma:                            Cardiovascular:Negative CV ROS  Exercise tolerance: good (>4 METS),         ECG reviewed               Beta Blocker:  Not on Beta Blocker         Neuro/Psych:   Negative Neuro/Psych ROS  (+) neuromuscular disease:,             GI/Hepatic/Renal: Neg GI/Hepatic/Renal ROS  (+) GERD:,           Endo/Other: Negative Endo/Other ROS             Pt had PAT visit. Abdominal:             Vascular: negative vascular ROS. Other Findings:             Anesthesia Plan      MAC and [place] block     ASA 2       Induction: intravenous. MIPS: Prophylactic antiemetics administered. Anesthetic plan and risks discussed with patient.         Attending anesthesiologist reviewed and agrees with Pre Eval content              Oliverio Currie MD   8/2/2021

## 2021-08-04 ENCOUNTER — TELEPHONE (OUTPATIENT)
Dept: ORTHOPEDIC SURGERY | Age: 56
End: 2021-08-04

## 2021-08-04 NOTE — TELEPHONE ENCOUNTER
Patient had Right carpal Tunnel surgery on 08/2, Patient called in asking if he can change the bandage. Patient would also like to know when he can change the bandage.      Please Advise

## 2021-08-09 ENCOUNTER — TELEPHONE (OUTPATIENT)
Dept: PRIMARY CARE CLINIC | Age: 56
End: 2021-08-09

## 2021-08-09 NOTE — TELEPHONE ENCOUNTER
Pt has physical scheduled 8/23/2021. Pt would like annual labs ordered including psa and a1c prior to appt.      Made aware if there is a problem that gets discussed, insurance will no longer cover physical.    Please advise pt when complete

## 2021-08-11 ENCOUNTER — TELEPHONE (OUTPATIENT)
Dept: PRIMARY CARE CLINIC | Age: 56
End: 2021-08-11

## 2021-08-11 DIAGNOSIS — Z12.5 PROSTATE CANCER SCREENING: ICD-10-CM

## 2021-08-11 DIAGNOSIS — Z00.00 PREVENTATIVE HEALTH CARE: Primary | ICD-10-CM

## 2021-08-11 DIAGNOSIS — R73.9 HYPERGLYCEMIA: ICD-10-CM

## 2021-08-11 DIAGNOSIS — K21.9 GASTROESOPHAGEAL REFLUX DISEASE WITHOUT ESOPHAGITIS: ICD-10-CM

## 2021-08-11 DIAGNOSIS — E78.00 PURE HYPERCHOLESTEROLEMIA: ICD-10-CM

## 2021-08-11 NOTE — TELEPHONE ENCOUNTER
----- Message from Alex Valdez sent at 8/11/2021 11:34 AM EDT -----  Subject: Message to Provider    QUESTIONS  Information for Provider? Pt has an appt 8/23/2021 for a physical and is   also interested in getting a prostate exam. Please advise patient if he is   able to get this prostate exam done during this appt time or if he needs a   separate appt. Pt also had a colonoscopy done when he was 48 d/t some   issues he was having, nothing was discovered but the provider who   performed it told him he should get another one done in 10 years. Pt will   be 56 this December and would like to get an order for a colonoscopy. Please advise  ---------------------------------------------------------------------------  --------------  CALL BACK INFO  What is the best way for the office to contact you? OK to leave message on   [employer], OK to respond with electronic message via [employer] portal (only   for patients who have registered [employer] account)  Preferred Call Back Phone Number? [phone]  ---------------------------------------------------------------------------  --------------  SCRIPT ANSWERS  Relationship to Patient?  Self

## 2021-08-16 ENCOUNTER — OFFICE VISIT (OUTPATIENT)
Dept: ORTHOPEDIC SURGERY | Age: 56
End: 2021-08-16

## 2021-08-16 VITALS
RESPIRATION RATE: 16 BRPM | WEIGHT: 192 LBS | TEMPERATURE: 97.1 F | HEART RATE: 86 BPM | BODY MASS INDEX: 27.49 KG/M2 | OXYGEN SATURATION: 99 % | HEIGHT: 70 IN

## 2021-08-16 DIAGNOSIS — G56.01 CARPAL TUNNEL SYNDROME, RIGHT: Primary | ICD-10-CM

## 2021-08-16 PROCEDURE — 99024 POSTOP FOLLOW-UP VISIT: CPT | Performed by: ORTHOPAEDIC SURGERY

## 2021-08-16 NOTE — PROGRESS NOTES
Subjective:      Patient ID: Lorena Daniel is a 60 y.o. male who presents today for:  Chief Complaint   Patient presents with    Post-Op Check     S/p RIGHT OPEN [address] - Right with Nita Cherry MD on 8/2/2021 . Pt states mild pain, more of a soreness. HPI  Patient doing fairly well overall. 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 Admits to some persistent numbness in the hand but no significant pain or tingling.     Past Medical History:   Diagnosis Date    Anal fistula     Arthritis     Asthma     Eczema     steroid christina work    GERD (gastroesophageal reflux disease)     Plantar fasciitis, bilateral     insoles work    Smoker     quit 2007      Past Surgical History:   Procedure Laterality Date    CARPAL TUNNEL RELEASE Right 8/2/2021    RIGHT OPEN CARPAL TUNNEL RELEASE performed by Nita Cherry MD at [address] [place] [address] I&D RECTAL SUBMUCOSAL ABSCESS N/A 2/13/2018    INCISION AND DRAINAGE PERIRECTAL RECTAL ABSCESS performed by Nuno Miller MD at [facility] Right     dislocation      Social History     Socioeconomic History    Marital status: Single     Spouse name: Not on file    Number of children: Not on file    Years of education: Not on file    Highest education level: Not on file   Occupational History    Not on file   Tobacco Use    Smoking status: Former Smoker     Packs/day: 1.50     Years: 26.00     Pack years: 39.00     Quit date: 7/11/2006     Years since quitting: 15.1    Smokeless tobacco: Never Used   Vaping Use    Vaping Use: Never used   Substance and Sexual Activity    Alcohol use: No    Drug use: No    Sexual activity: Never   Other Topics Concern    Not on file   Social History Narrative    Not on file     Social Determinants of Health     Financial Resource Strain: Low Risk     Difficulty of Paying Living Expenses: Not hard at all   Food Insecurity: No Food Insecurity    Worried About Running Out of Food in the Last Year: Never true    [address] in the Last Year: Never true   Transportation Needs:     Lack of Transportation (Medical):  Lack of Transportation (Non-Medical):    Physical Activity:     Days of Exercise per Week:     Minutes of Exercise per Session:    Stress:     Feeling of Stress :    Social Connections:     Frequency of Communication with Friends and Family:     Frequency of Social Gatherings with Friends and Family:     Attends [employer] Services:     Active Member of Clubs or Organizations:     Attends Club or Organization Meetings:     Marital Status:    Intimate Partner Violence:     Fear of Current or Ex-Partner:     Emotionally Abused:     Physically Abused:     Sexually Abused:      No family history on file. Allergies   Allergen Reactions    Amoxicillin Diarrhea     Stomach problems     Clindamycin/Lincomycin Diarrhea     Stomach problmes     Current Outpatient Medications on File Prior to Visit   Medication Sig Dispense Refill    Ascorbic Acid (VITAMIN C) 250 MG tablet Take 250 mg by mouth daily      Zinc 100 MG TABS Take by mouth daily      albuterol sulfate  (90 Base) MCG/ACT inhaler Inhale 2 puffs into the lungs every 6 hours as needed for Wheezing 3 Inhaler 3    fluticasone-salmeterol (ADVAIR) 250-50 MCG/DOSE AEPB Inhale 1 puff into the lungs every 12 hours 3 each 3    pantoprazole (PROTONIX) 40 MG tablet Take 1 tablet by mouth daily 90 tablet 3    triamcinolone (KENALOG) 0.1 % cream Apply a thin film to the affected area twice daily until resolved.  240 g 3    cetirizine (ZYRTEC ALLERGY) 10 MG tablet Take 10 mg by mouth daily       aspirin 81 MG tablet Take 81 mg by mouth daily      Multiple Vitamins-Minerals (THERAPEUTIC MULTIVITAMIN-MINERALS) tablet Take 1 tablet by mouth daily      Omega-3 Fatty Acids (FISH OIL) 1000 MG CPDR Take 1,000 mg by mouth daily      fluticasone (FLONASE) 50 MCG/ACT nasal spray 1 spray by Each Nare route daily (Patient not taking: Reported on 8/16/2021) 3 Bottle 3    acetaminophen (TYLENOL) 325 MG tablet Take 650 mg by mouth every 6 hours as needed for Pain (Patient not taking: Reported on 8/16/2021)       No current facility-administered medications on file prior to visit. Review of Systems  General: Denies fever, chills  Cardiovascular: Denies chest pain  Pulmonary: Denies shortness of breath  GI: Denies nausea or vomiting  Neuro: Denies numbness or tingling except as document HPI    Objective:   Pulse 86   Temp 97.1 °F (36.2 °C) (Temporal)   Resp 16   Ht 5' 10\" (1.778 m)   Wt 192 lb (87.1 kg)   SpO2 99%   BMI 27.55 kg/m²     Ortho Exam  Right upper extremity: Patient's volar palmar incision is well-healed. Sutures were subsequently moved and Steri-Strips applied. Patient is minimally tender palpation about the surgical site. There is no significant edema or ecchymosis appreciated about the hand or wrist.  Patient with expected stiffness in the hand and wrist given recent surgery. Sensation present but persistently decreased in the median nerve distribution. Radiographs and Laboratory Studies:     Diagnostic Imaging Studies:    None    Laboratory Studies:   Lab Results   Component Value Date    WBC 6.9 07/26/2021    HGB 16.2 07/26/2021    HCT 46.9 07/26/2021    MCV 84.9 07/26/2021     07/26/2021     No results found for: SEDRATE  No results found for: CRP    Assessment:      2-week status post right open carpal tunnel release     Plan:     Patient doing well overall. Patient was counseled that sensation recovery can take up to 9 months. Overall patient's current course is well within standard postop recovery. Okay for patient to begin light lifting activity around the house. Patient adamant that he wants to return to work and stated that if he was going to do so he should only do so with a glove to keep surgical hand covered to prevent contamination of a still healing surgical wound.

## 2021-08-23 ENCOUNTER — OFFICE VISIT (OUTPATIENT)
Dept: PRIMARY CARE CLINIC | Age: 56
End: 2021-08-23
Payer: COMMERCIAL

## 2021-08-23 VITALS
TEMPERATURE: 98.4 F | WEIGHT: 192 LBS | SYSTOLIC BLOOD PRESSURE: 124 MMHG | OXYGEN SATURATION: 98 % | HEART RATE: 88 BPM | BODY MASS INDEX: 27.49 KG/M2 | DIASTOLIC BLOOD PRESSURE: 82 MMHG | HEIGHT: 70 IN

## 2021-08-23 DIAGNOSIS — J45.40 MODERATE PERSISTENT ASTHMA, UNSPECIFIED WHETHER COMPLICATED: ICD-10-CM

## 2021-08-23 DIAGNOSIS — Z12.11 COLON CANCER SCREENING: ICD-10-CM

## 2021-08-23 DIAGNOSIS — K21.00 GASTROESOPHAGEAL REFLUX DISEASE WITH ESOPHAGITIS WITHOUT HEMORRHAGE: ICD-10-CM

## 2021-08-23 DIAGNOSIS — E78.00 PURE HYPERCHOLESTEROLEMIA: ICD-10-CM

## 2021-08-23 DIAGNOSIS — Z00.00 PREVENTATIVE HEALTH CARE: Primary | ICD-10-CM

## 2021-08-23 DIAGNOSIS — Z76.0 MEDICATION REFILL: ICD-10-CM

## 2021-08-23 DIAGNOSIS — L30.9 DERMATITIS: ICD-10-CM

## 2021-08-23 PROCEDURE — 99396 PREV VISIT EST AGE 40-64: CPT | Performed by: INTERNAL MEDICINE

## 2021-08-23 RX ORDER — TRIAMCINOLONE ACETONIDE 1 MG/G
CREAM TOPICAL
Qty: 240 G | Refills: 3 | Status: SHIPPED | OUTPATIENT
Start: 2021-08-23

## 2021-08-23 RX ORDER — PANTOPRAZOLE SODIUM 40 MG/1
40 TABLET, DELAYED RELEASE ORAL DAILY
Qty: 90 TABLET | Refills: 3 | Status: SHIPPED | OUTPATIENT
Start: 2021-08-23 | End: 2022-08-23

## 2021-08-23 ASSESSMENT — ENCOUNTER SYMPTOMS
VOICE CHANGE: 0
PHOTOPHOBIA: 0
SHORTNESS OF BREATH: 0
NAUSEA: 0
COUGH: 0
TROUBLE SWALLOWING: 0

## 2021-08-23 NOTE — PROGRESS NOTES
Linder Bloch 60 y.o. male presents today with   Chief Complaint   Patient presents with    Annual Exam     Pt would like his prostate physically tested       Gastroesophageal Reflux  He complains of heartburn and wheezing. He reports no chest pain, no coughing or no nausea. This is a recurrent problem. The current episode started more than 1 year ago. The problem has been waxing and waning. The heartburn duration is several minutes. Pertinent negatives include no fatigue. He has tried a PPI for the symptoms. The treatment provided moderate relief. Asthma  He complains of chest tightness and wheezing. There is no cough or shortness of breath. This is a recurrent problem. The problem has been waxing and waning. Associated symptoms include heartburn and postnasal drip. Pertinent negatives include no chest pain, fever, myalgias or trouble swallowing. His past medical history is significant for asthma.    annual exam      Past Medical History:   Diagnosis Date    Anal fistula     Arthritis     Asthma     Eczema     steroid christina work    GERD (gastroesophageal reflux disease)     Plantar fasciitis, bilateral     insoles work    Smoker     quit 2007     Patient Active Problem List    Diagnosis Date Noted    Bilateral carpal tunnel syndrome     Periradicular pathology associated with previous endodontic treatment 02/13/2018    Generalized anxiety disorder 02/13/2018    Obsessive-compulsive disorder 02/13/2018    Reflux esophagitis 02/13/2018    Hemorrhage of anus and rectum 02/13/2018    Perirectal abscess     Pain in left finger(s) 07/25/2017    Malingerer (conscious simulation) 07/25/2017    Cellulitis of left finger 07/25/2017    Dermatitis 10/04/2016    Dermatofibroma of right thigh 10/04/2016    Acquired equinus deformity of both feet 03/14/2016    Calcaneal spur 03/14/2016    Plantar fasciitis, left 03/14/2016    Mixed hyperlipidemia 10/08/2015    Dermatophytosis of foot 05/09/2012    Other seborrheic keratosis 11/11/2011    Idiopathic urticaria 04/03/2009    Allergic rhinitis due to pollen 01/23/2009    Moderate persistent asthma 08/10/0489    Umbilical hernia 07/03/5547    Constipation 12/04/2001    Depressive disorder, not elsewhere classified 12/04/2001     Past Surgical History:   Procedure Laterality Date    CARPAL TUNNEL RELEASE Right 8/2/2021    RIGHT OPEN CARPAL TUNNEL RELEASE performed by Vianey Carpio MD at [facility] I&D [address] [place] N/A 2/13/2018    INCISION AND DRAINAGE PERIRECTAL RECTAL ABSCESS performed by Tianna Duque MD at [facility] Right     dislocation      No family history on file. Social History     Socioeconomic History    Marital status: Single     Spouse name: None    Number of children: None    Years of education: None    Highest education level: None   Occupational History    None   Tobacco Use    Smoking status: Former Smoker     Packs/day: 1.50     Years: 26.00     Pack years: 39.00     Quit date: 7/11/2006     Years since quitting: 15.1    Smokeless tobacco: Never Used   Vaping Use    Vaping Use: Never used   Substance and Sexual Activity    Alcohol use: No    Drug use: No    Sexual activity: Never   Other Topics Concern    None   Social History Narrative    None     Social Determinants of Health     Financial Resource Strain:     Difficulty of Paying Living Expenses:    Food Insecurity:     Worried About Running Out of Food in the Last Year:     Ran Out of Food in the Last Year:    Transportation Needs:     Lack of Transportation (Medical):      Lack of Transportation (Non-Medical):    Physical Activity:     Days of Exercise per Week:     Minutes of Exercise per Session:    Stress:     Feeling of Stress :    Social Connections:     Frequency of Communication with Friends and Family:     Frequency of Social Gatherings with Friends and Family:     Attends [employer] Services:     Active Member of Clubs or Organizations:     Attends Club or Organization Meetings:     Marital Status:    Intimate Partner Violence:     Fear of Current or Ex-Partner:     Emotionally Abused:     Physically Abused:     Sexually Abused: Allergies   Allergen Reactions    Amoxicillin Diarrhea     Stomach problems     Clindamycin/Lincomycin Diarrhea     Stomach problmes       Review of Systems   Constitutional: Negative for fatigue and fever. HENT: Positive for postnasal drip. Negative for trouble swallowing and voice change. Eyes: Negative for photophobia and visual disturbance. Respiratory: Positive for wheezing. Negative for cough and shortness of breath. Cardiovascular: Negative for chest pain and palpitations. Gastrointestinal: Positive for heartburn. Negative for nausea. Genitourinary: Negative for decreased urine volume, testicular pain and urgency. Musculoskeletal: Negative for myalgias. Skin: Positive for rash. Neurological: Negative for tremors and syncope. Hematological: Does not bruise/bleed easily. Psychiatric/Behavioral: Negative for suicidal ideas. Vitals:    08/23/21 0919   BP: 124/82   Site: Right Upper Arm   Cuff Size: Large Adult   Pulse: 88   Temp: 98.4 °F (36.9 °C)   SpO2: 98%   Weight: 192 lb (87.1 kg)   Height: 5' 10\" (1.778 m)       Physical Exam  Constitutional:       Appearance: He is well-developed. HENT:      Head: Normocephalic and atraumatic. Eyes:      Conjunctiva/sclera: Conjunctivae normal.      Pupils: Pupils are equal, round, and reactive to light. Cardiovascular:      Rate and Rhythm: Normal rate and regular rhythm. Pulmonary:      Effort: Pulmonary effort is normal. No respiratory distress. Breath sounds: No wheezing. Abdominal:      General: Bowel sounds are normal. There is no distension. Tenderness: There is no abdominal tenderness.    Genitourinary:     Penis: Normal.       Testes: Normal.      Prostate: Normal.      Rectum: Normal.   Musculoskeletal:         General: Normal range of motion. Cervical back: Normal range of motion. Skin:     General: Skin is warm. Coloration: Skin is not jaundiced. Neurological:      Mental Status: He is alert. Cranial Nerves: No cranial nerve deficit. Psychiatric:         Mood and Affect: Mood normal.     I discuss his labs  Assessment/Plan  Mary Roman was seen today for annual exam.    Diagnoses and all orders for this visit:    Preventative health care    Moderate persistent asthma, unspecified whether complicated  -     fluticasone-salmeterol (ADVAIR) 250-50 MCG/DOSE AEPB; Inhale 1 puff into the lungs every 12 hours    Reflux esophagitis  -     pantoprazole (PROTONIX) 40 MG tablet; Take 1 tablet by mouth daily    Pure hypercholesterolemia  -     Lipid Panel; Future  -     Comprehensive Metabolic Panel; Future    Dermatitis  -     triamcinolone (KENALOG) 0.1 % cream; Apply a thin film to the affected area twice daily until resolved. Medication refill    Colon cancer screening  -     [facility], [place]        Return in about 1 year (around 8/23/2022), or if symptoms worsen or fail to improve.     Juvenal Aviles MD

## 2021-08-31 ASSESSMENT — ENCOUNTER SYMPTOMS
WHEEZING: 1
HEARTBURN: 1
CHEST TIGHTNESS: 1

## 2021-09-13 ENCOUNTER — OFFICE VISIT (OUTPATIENT)
Dept: ORTHOPEDIC SURGERY | Age: 56
End: 2021-09-13

## 2021-09-13 DIAGNOSIS — G56.03 BILATERAL CARPAL TUNNEL SYNDROME: Primary | ICD-10-CM

## 2021-09-13 PROCEDURE — 99024 POSTOP FOLLOW-UP VISIT: CPT | Performed by: ORTHOPAEDIC SURGERY

## 2021-09-13 NOTE — PROGRESS NOTES
Subjective:      Patient ID: Joaquín Mcnally is a 60 y.o. male who presents today for:  Chief Complaint   Patient presents with    Follow-up     Carpal Tunnel sx, Right hand on 8/2/2021. Patient complains of pain in his right hand that has improved. HPI  Patient approximately 5 weeks status post right open carpal tunnel release. 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 Patient has begun weight training again with 5 pound weights on the right hand and states this causes a mild amount of discomfort.     Past Medical History:   Diagnosis Date    Anal fistula     Arthritis     Asthma     Eczema     steroid christina work    GERD (gastroesophageal reflux disease)     Plantar fasciitis, bilateral     insoles work    Smoker     quit 2007      Past Surgical History:   Procedure Laterality Date    CARPAL TUNNEL RELEASE Right 8/2/2021    RIGHT OPEN CARPAL TUNNEL RELEASE performed by Armen Rodriguez MD at [address] [place] [address] I&D RECTAL SUBMUCOSAL ABSCESS N/A 2/13/2018    INCISION AND DRAINAGE PERIRECTAL RECTAL ABSCESS performed by Petra Schwab, MD at [facility] Right     dislocation      Social History     Socioeconomic History    Marital status: Single     Spouse name: Not on file    Number of children: Not on file    Years of education: Not on file    Highest education level: Not on file   Occupational History    Not on file   Tobacco Use    Smoking status: Former Smoker     Packs/day: 1.50     Years: 26.00     Pack years: 39.00     Quit date: 7/11/2006     Years since quitting: 15.1    Smokeless tobacco: Never Used   Vaping Use    Vaping Use: Never used   Substance and Sexual Activity    Alcohol use: No    Drug use: No    Sexual activity: Never   Other Topics Concern    Not on file   Social History Narrative    Not on file     Social Determinants of Health     Financial Resource Strain:     Difficulty of Paying Living Expenses:    Food Insecurity:     Worried About Running Out of Food in the Last Year:     [address] in the Last Year:    Transportation Needs:     Lack of Transportation (Medical):  Lack of Transportation (Non-Medical):    Physical Activity:     Days of Exercise per Week:     Minutes of Exercise per Session:    Stress:     Feeling of Stress :    Social Connections:     Frequency of Communication with Friends and Family:     Frequency of Social Gatherings with Friends and Family:     Attends Holiness Services:     Active Member of Clubs or Organizations:     Attends Club or Organization Meetings:     Marital Status:    Intimate Partner Violence:     Fear of Current or Ex-Partner:     Emotionally Abused:     Physically Abused:     Sexually Abused:      No family history on file. Allergies   Allergen Reactions    Amoxicillin Diarrhea     Stomach problems     Clindamycin/Lincomycin Diarrhea     Stomach problmes     Current Outpatient Medications on File Prior to Visit   Medication Sig Dispense Refill    pantoprazole (PROTONIX) 40 MG tablet Take 1 tablet by mouth daily 90 tablet 3    [START ON 10/23/2021] fluticasone-salmeterol (ADVAIR) 250-50 MCG/DOSE AEPB Inhale 1 puff into the lungs every 12 hours 3 each 3    triamcinolone (KENALOG) 0.1 % cream Apply a thin film to the affected area twice daily until resolved.  240 g 3    Ascorbic Acid (VITAMIN C) 250 MG tablet Take 250 mg by mouth daily      Zinc 100 MG TABS Take by mouth daily      albuterol sulfate  (90 Base) MCG/ACT inhaler Inhale 2 puffs into the lungs every 6 hours as needed for Wheezing 3 Inhaler 3    fluticasone (FLONASE) 50 MCG/ACT nasal spray 1 spray by Each Nare route daily 3 Bottle 3    cetirizine (ZYRTEC ALLERGY) 10 MG tablet Take 10 mg by mouth daily       acetaminophen (TYLENOL) 325 MG tablet Take 650 mg by mouth every 6 hours as needed for Pain       aspirin 81 MG tablet Take 81 mg by mouth daily      Multiple Vitamins-Minerals (THERAPEUTIC MULTIVITAMIN-MINERALS) tablet Take 1 tablet by mouth daily      Omega-3 Fatty Acids (FISH OIL) 1000 MG CPDR Take 1,000 mg by mouth daily       No current facility-administered medications on file prior to visit. Review of Systems  General: Denies fever, chills  Cardiovascular: Denies chest pain  Pulmonary: Denies shortness of breath  GI: Denies nausea or vomiting  Neuro: Denies numbness or tingling    Objective: There were no vitals taken for this visit. Ortho Exam  Right upper extremity: Patient has noted mild tenderness palpation about the surgical site. Patient sensation is intact light touch in the median nerve distribution that continues to improve from prior evaluation and exam.  Patient's  strength is significantly improved from prior exam as well. Patient has well-healed surgical incision with mature scar present. Radiographs and Laboratory Studies:     Diagnostic Imaging Studies:    None    Laboratory Studies:   Lab Results   Component Value Date    WBC 6.7 08/16/2021    HGB 16.1 08/16/2021    HCT 46.6 08/16/2021    MCV 84.9 08/16/2021     08/16/2021     No results found for: SEDRATE  No results found for: CRP    Assessment:      5 weeks status post right open carpal tunnel release     Plan:     Patient may resume all activities as tolerated about his right upper extremity and counseled patient that recovery may take several months to upwards of a year for full return of nerve function. Patient states that he has noted progressive increased pain and numbness about his left hand in the similar nerve distribution and is inquiring about possible surgical release of the left transverse carpal tunnel ligament. Patient wants to get some affairs in order and see me back in 3 weeks for repeat evaluation of both his right and left hand and address this further.     Efe Menendez MD

## 2021-09-14 ENCOUNTER — TELEPHONE (OUTPATIENT)
Dept: ORTHOPEDIC SURGERY | Age: 56
End: 2021-09-14

## 2021-09-14 NOTE — TELEPHONE ENCOUNTER
Patient called and would like to schedule surgery for carpal tunnel release left hand. Please put in surgery sheet, patient would like to have this surgery on or about oct.  11, 2021

## 2021-09-20 ENCOUNTER — TELEPHONE (OUTPATIENT)
Dept: ORTHOPEDIC SURGERY | Age: 56
End: 2021-09-20

## 2021-09-20 NOTE — TELEPHONE ENCOUNTER
Julia BEAR on patients VM to contact office. Surgery date has been changed to 10/18/2021 due to Dr. Jocy Correa not being available to do surgery on 10/11/2021. When patient calls office back. Please give patient all appts dated and new surgery date.     Surgery: 10/18/2021  Pre-op: 10/14/2021 @2pm with SIGIFREDO Araujo   Post-op: 11/1/2021 @9:30a with Dr. Jocy Correa

## 2021-09-20 NOTE — TELEPHONE ENCOUNTER
Previously had undergone discussion with patient in regards to proceeding with left open carpal tunnel release. Patient feels that he would like to proceed at this juncture after phone conversation. Surgery Phone: [phone]   [unfilled]   Surgery Fax: [phone]    Phone: [phone]          Fax: [phone]: Surgery Scheduling, PAT & PRE-OP Order Form  Call to advance Wylie at [phone] at least 24 hours prior to date of service     Surgery Location: [facility]: [address], [address] [facility]  Carolyne Bernard MD Surgery Date: 10/18/21  Time: to follow other case   Patient's Name: Kim Dennis : 1965    Gender: male   Home Phone:  [phone] Cell Phone: [phone]    #:  [id number]  Emergency Contact:  Jessica Manrique   Phone: [phone]  Payor: Shorty Maxwell [id number] /  /  /    ID No.: [unfilled]      PROVIDER TO COMPLETE:  Diagnosis: Left carpal tunnel syndrome  Procedure/Consent: Open left carpal tunnel release  CPT Codes: 33383  Case Comments/Implants: Hand tray  Surgery Scheduled as:  Outpatient  Anesthesia Requested: MAC/TIVA  Referring Family Doctor: Ritu Balderas MD   PAT  [x] [facility] Date/Time:                                                            [x] History & Physical [] Physician will Provide [] Attached [] Dictated [] Other  [x] Follow Anesthesia Pre-Op Orders for X-rays, [facility]     [x] SN & PT to evaluate and treat/educate disease management, medications, home safety & equipment needs for total joint patients  [] Other: ____________________________________________________  Consults: Medical/Cardiac Clearance done by  ____________________  PRE-OP ORDERS:   Allergies: Amoxicillin and Clindamycin/lincomycin Latex Allergies:             Diabetic:           [] IV ________________________  [x] IV Start with J-loop     Preprinted Orders: Attached [] Yes [] No   ANTIBIOTIC PRE-OP: [x] ANCEF 2 gram IVPB if > 120 kg 3 grams IVPB within 1 hour of incision, if ALLERGIC, use VANCOMYCIN 1 gram IV, 2 hours pre-op  [] TXA Protocol [] Other:   [x] NPO   [] Betablocker (if needed) _____________________________________   [] Knee high anti-embolic hose [] Thigh high anti-embolic hose   Other: ______________________________________________________    Physician Signature Required: Electronically signed by Megan Jimenez MD on 9/20/2021 at 9:10 AM     Date/Time: 9/20/2021

## 2021-09-20 NOTE — TELEPHONE ENCOUNTER
Patient called and stated that 10/18/2021 surgery date does not work for him. Patient would like to schedule surgery for 10/25/2021.

## 2021-10-04 ENCOUNTER — ANESTHESIA (OUTPATIENT)
Dept: ENDOSCOPY | Age: 56
End: 2021-10-04
Payer: COMMERCIAL

## 2021-10-04 ENCOUNTER — ANESTHESIA EVENT (OUTPATIENT)
Dept: ENDOSCOPY | Age: 56
End: 2021-10-04
Payer: COMMERCIAL

## 2021-10-04 ENCOUNTER — HOSPITAL ENCOUNTER (OUTPATIENT)
Age: 56
Setting detail: OUTPATIENT SURGERY
Discharge: HOME | End: 2021-10-04
Attending: INTERNAL MEDICINE | Admitting: INTERNAL MEDICINE
Payer: COMMERCIAL

## 2021-10-04 ENCOUNTER — ANCILLARY PROCEDURE (OUTPATIENT)
Dept: ENDOSCOPY | Age: 56
End: 2021-10-04
Attending: INTERNAL MEDICINE
Payer: COMMERCIAL

## 2021-10-04 VITALS
SYSTOLIC BLOOD PRESSURE: 158 MMHG | WEIGHT: 192 LBS | HEIGHT: 70 IN | RESPIRATION RATE: 16 BRPM | BODY MASS INDEX: 27.49 KG/M2 | TEMPERATURE: 97.8 F | OXYGEN SATURATION: 99 % | HEART RATE: 67 BPM | DIASTOLIC BLOOD PRESSURE: 96 MMHG

## 2021-10-04 VITALS — DIASTOLIC BLOOD PRESSURE: 67 MMHG | OXYGEN SATURATION: 97 % | SYSTOLIC BLOOD PRESSURE: 134 MMHG

## 2021-10-04 PROCEDURE — 7100000011 HC PHASE II RECOVERY - ADDTL 15 MIN: Performed by: INTERNAL MEDICINE

## 2021-10-04 PROCEDURE — 45385 COLONOSCOPY W/LESION REMOVAL: CPT | Performed by: INTERNAL MEDICINE

## 2021-10-04 PROCEDURE — 2580000003 HC RX 258: Performed by: INTERNAL MEDICINE

## 2021-10-04 PROCEDURE — 2580000003 HC RX 258

## 2021-10-04 PROCEDURE — 2709999900 HC NON-CHARGEABLE SUPPLY: Performed by: INTERNAL MEDICINE

## 2021-10-04 PROCEDURE — 3609027000 HC COLONOSCOPY: Performed by: INTERNAL MEDICINE

## 2021-10-04 PROCEDURE — 6360000002 HC RX W HCPCS: Performed by: NURSE ANESTHETIST, CERTIFIED REGISTERED

## 2021-10-04 PROCEDURE — 3700000001 HC ADD 15 MINUTES (ANESTHESIA): Performed by: INTERNAL MEDICINE

## 2021-10-04 PROCEDURE — 7100000010 HC PHASE II RECOVERY - FIRST 15 MIN: Performed by: INTERNAL MEDICINE

## 2021-10-04 PROCEDURE — 3700000000 HC ANESTHESIA ATTENDED CARE: Performed by: INTERNAL MEDICINE

## 2021-10-04 PROCEDURE — 88305 TISSUE EXAM BY PATHOLOGIST: CPT

## 2021-10-04 PROCEDURE — 45380 COLONOSCOPY AND BIOPSY: CPT | Performed by: INTERNAL MEDICINE

## 2021-10-04 PROCEDURE — 2500000003 HC RX 250 WO HCPCS: Performed by: NURSE ANESTHETIST, CERTIFIED REGISTERED

## 2021-10-04 RX ORDER — PROPOFOL 10 MG/ML
INJECTION, EMULSION INTRAVENOUS CONTINUOUS PRN
Status: DISCONTINUED | OUTPATIENT
Start: 2021-10-04 | End: 2021-10-04 | Stop reason: SDUPTHER

## 2021-10-04 RX ORDER — SODIUM CHLORIDE 9 MG/ML
INJECTION, SOLUTION INTRAVENOUS
Status: COMPLETED
Start: 2021-10-04 | End: 2021-10-04

## 2021-10-04 RX ORDER — ONDANSETRON 2 MG/ML
4 INJECTION INTRAMUSCULAR; INTRAVENOUS
Status: DISCONTINUED | OUTPATIENT
Start: 2021-10-04 | End: 2021-10-04 | Stop reason: HOSPADM

## 2021-10-04 RX ORDER — MAGNESIUM HYDROXIDE 1200 MG/15ML
LIQUID ORAL PRN
Status: DISCONTINUED | OUTPATIENT
Start: 2021-10-04 | End: 2021-10-04 | Stop reason: ALTCHOICE

## 2021-10-04 RX ORDER — SODIUM CHLORIDE 9 MG/ML
INJECTION, SOLUTION INTRAVENOUS CONTINUOUS
Status: DISCONTINUED | OUTPATIENT
Start: 2021-10-04 | End: 2021-10-04 | Stop reason: HOSPADM

## 2021-10-04 RX ORDER — LIDOCAINE HYDROCHLORIDE 20 MG/ML
INJECTION, SOLUTION INFILTRATION; PERINEURAL PRN
Status: DISCONTINUED | OUTPATIENT
Start: 2021-10-04 | End: 2021-10-04 | Stop reason: SDUPTHER

## 2021-10-04 RX ADMIN — SODIUM CHLORIDE: 9 INJECTION, SOLUTION INTRAVENOUS at 07:33

## 2021-10-04 RX ADMIN — SODIUM CHLORIDE 500 ML: 9 INJECTION, SOLUTION INTRAVENOUS at 07:09

## 2021-10-04 RX ADMIN — LIDOCAINE HYDROCHLORIDE 40 MG: 20 INJECTION, SOLUTION INFILTRATION; PERINEURAL at 07:45

## 2021-10-04 RX ADMIN — PROPOFOL 150 MCG/KG/MIN: 10 INJECTION, EMULSION INTRAVENOUS at 07:45

## 2021-10-04 ASSESSMENT — PULMONARY FUNCTION TESTS
PIF_VALUE: 0
PIF_VALUE: 1

## 2021-10-04 ASSESSMENT — PAIN - FUNCTIONAL ASSESSMENT: PAIN_FUNCTIONAL_ASSESSMENT: 0-10

## 2021-10-04 NOTE — ANESTHESIA POSTPROCEDURE EVALUATION
Department of Anesthesiology  Postprocedure Note    Patient: Barry Oliveira  MRN: [id number]  YOB: 1965  Date of evaluation: 10/4/2021  Time:  8:27 AM     Procedure Summary     Date: 10/04/21 Room / Location: [address] [place] [address]    Anesthesia Start: 1483 Anesthesia Stop:     Procedure: COLORECTAL CANCER SCREENING, NOT HIGH RISK (N/A ) Diagnosis: (Colon cancer screening Z12.11)    Surgeons: Andrei Weir MD Responsible Provider: MAULIK Ortega CRNA    Anesthesia Type: MAC ASA Status: 2          Anesthesia Type: No value filed. Zev Phase I: Zev Score: 10    Zev Phase II:      Last vitals: Reviewed and per EMR flowsheets.        Anesthesia Post Evaluation    Patient location during evaluation: PACU  Patient participation: complete - patient participated  Level of consciousness: awake and alert  Pain score: 0  Airway patency: patent  Nausea & Vomiting: no nausea and no vomiting  Complications: no  Cardiovascular status: blood pressure returned to baseline and hemodynamically stable  Respiratory status: acceptable  Hydration status: euvolemic

## 2021-10-04 NOTE — H&P
Patient Name: Barry Oliveira  : 1965  MRN: [id number]  DATE: 10/04/21      ENDOSCOPY  History and Physical    Procedure:    [] Diagnostic Colonoscopy       [x] Screening Colonoscopy  [] EGD      [] ERCP      [] EUS       [] Other    [x] Previous office notes/History and Physical reviewed from the patients chart. Please see EMR for further details of HPI. I have examined the patient's status immediately prior to the procedure and:      Indications/HPI:    []Abdominal Pain   []Cancer- GI/Lung  []Fhx of colon CA  []History of Polyps   []Jaureguis   []Melena  []Abnormal Imaging   []Dysphagia    []Persistent Pneumonia  []Anemia   []Food Impaction  []History of Polyps  []GI Bleed   []Pulmonary nodule/Mass  []Change in bowel habits  []Heartburn/Reflux  []Rectal Bleed (BRBPR)  []Chest Pain - Non Cardiac  []Heme (+) Stool  []Ulcers  []Constipation   []Hemoptysis   []Varices  []Diarrhea   []Hypoxemia  []Nausea/Vomiting   [x]Screening   []Crohns/Colitis  [x]Other: Patient reports history of anesthesia (?  Fistula) 9 years ago, underwent colonoscopy at the [facility], no findings of fistula or aphasia on examination. Patient also describes recent history of perirectal abscess. This was drained. Reports history of constipation, on daily fiber and stool softeners. Additionally describes history of hemorrhoids. Anesthesia:   [x] MAC [] Moderate Sedation   [] General   [] None     ROS: 12 pt Review of Symptoms was negative unless mentioned above    Medications:   Prior to Admission medications    Medication Sig Start Date End Date Taking? Authorizing Provider   pantoprazole (PROTONIX) 40 MG tablet Take 1 tablet by mouth daily 21 Yes Luis Felipe Watts MD   fluticasone-salmeterol (ADVAIR) 250-50 MCG/DOSE AEPB Inhale 1 puff into the lungs every 12 hours 10/23/21  Yes Luis Felipe Watts MD   triamcinolone (KENALOG) 0.1 % cream Apply a thin film to the affected area twice daily until resolved.  21 Yes Joe Mcdaniel MD   albuterol sulfate  (90 Base) MCG/ACT inhaler Inhale 2 puffs into the lungs every 6 hours as needed for Wheezing 4/28/21  Yes Joe Mcdaniel MD   acetaminophen (TYLENOL) 325 MG tablet Take 650 mg by mouth every 6 hours as needed for Pain    Yes Historical Provider, MD   Ascorbic Acid (VITAMIN C) 250 MG tablet Take 250 mg by mouth daily    Historical Provider, MD   Zinc 100 MG TABS Take by mouth daily    Historical Provider, MD   fluticasone Vonita Dire) 50 MCG/ACT nasal spray 1 spray by Each Nare route daily 2/4/19   Ana Vallecillo MD   cetirizine (ZYRTEC ALLERGY) 10 MG tablet Take 10 mg by mouth daily  8/23/13   Historical Provider, MD   aspirin 81 MG tablet Take 81 mg by mouth daily    Historical Provider, MD   Multiple Vitamins-Minerals (THERAPEUTIC MULTIVITAMIN-MINERALS) tablet Take 1 tablet by mouth daily    Historical Provider, MD   Omega-3 Fatty Acids (FISH OIL) 1000 MG CPDR Take 1,000 mg by mouth daily    Historical Provider, MD       Allergies:    Allergies   Allergen Reactions    Amoxicillin Diarrhea     Stomach problems     Clindamycin/Lincomycin Diarrhea     Stomach problmes      History of allergic reaction to anesthesia:  No    Past Medical History:  Past Medical History:   Diagnosis Date    Anal fistula     Arthritis     Asthma     Eczema     steroid christina work    GERD (gastroesophageal reflux disease)     Plantar fasciitis, bilateral     insoles work    Smoker     quit 2007     Past Surgical History:  Past Surgical History:   Procedure Laterality Date    CARPAL TUNNEL RELEASE Right 8/2/2021    RIGHT OPEN CARPAL TUNNEL RELEASE performed by Tova Phoenix, MD at [address] [place] [address]      2011, no polyps    IN I&D RECTAL SUBMUCOSAL ABSCESS N/A 2/13/2018    INCISION AND DRAINAGE PERIRECTAL RECTAL ABSCESS performed by Saira Jones MD at [facility] Right     dislocation      Social History:  Social History     Tobacco Use    Smoking status: Former Smoker     Packs/day: 1.50     Years: 26.00     Pack years: 39.00     Quit date: 7/11/2006     Years since quitting: 15.2    Smokeless tobacco: Never Used   Vaping Use    Vaping Use: Never used   Substance Use Topics    Alcohol use: No    Drug use: No     Vital Signs:   Vitals:    10/04/21 0701   BP: (!) 169/93   Pulse: 79   Resp: 18   Temp: 97.8 °F (36.6 °C)   SpO2: 99%     Physical Exam:  Cardiac:  [x]WNL  []Comments:  Pulmonary:  [x]WNL   []Comments:   Neuro/Mental Status:  [x]WNL  []Comments:  Abdominal:  [x]WNL    []Comments:  Other:   []WNL  []Comments:    Informed Consent:  The risks and benefits of the procedure have been discussed with either the patient or if they cannot consent, their representative. Assessment:  Patient examined and appropriate for planned sedation and procedure. Plan:  Proceed with planned sedation and procedure as above. The patient was counseled at length about risks of robbie COVID19 in the perioperative and any recovery window from the procedure. The patient was made aware that robbie [address] may worsen their prognosis for recovery from their procedure and lend to a higher morbidity andall mortality risk. The patient was given the option of postponing the procedure all material risks, benefits, and alternatives were discussed. The patient does wish to proceed with the procedure at this time.     Rojas Mcnair MD  7:46 AM

## 2021-10-04 NOTE — ANESTHESIA PRE PROCEDURE
Department of Anesthesiology  Preprocedure Note       Name:  Ana Fritz   Age:  60 y.o.  :  1965                                          MRN:  [id number]         Date:  10/4/2021      Surgeon: Gianni Edge):  Thao Tay MD    Procedure: Procedure(s):  COLORECTAL CANCER SCREENING, NOT HIGH RISK    Medications prior to admission:   Prior to Admission medications    Medication Sig Start Date End Date Taking? Authorizing Provider   pantoprazole (PROTONIX) 40 MG tablet Take 1 tablet by mouth daily 21 Yes Lionel Swift MD   fluticasone-salmeterol (ADVAIR) 250-50 MCG/DOSE AEPB Inhale 1 puff into the lungs every 12 hours 10/23/21  Yes Lionel Swift MD   triamcinolone (KENALOG) 0.1 % cream Apply a thin film to the affected area twice daily until resolved.  21  Yes Lionel Swift MD   albuterol sulfate  (90 Base) MCG/ACT inhaler Inhale 2 puffs into the lungs every 6 hours as needed for Wheezing 21  Yes Lionel Swift MD   acetaminophen (TYLENOL) 325 MG tablet Take 650 mg by mouth every 6 hours as needed for Pain    Yes Historical Provider, MD   Ascorbic Acid (VITAMIN C) 250 MG tablet Take 250 mg by mouth daily    Historical Provider, MD   Zinc 100 MG TABS Take by mouth daily    Historical Provider, MD   fluticasone [facility]) 50 MCG/ACT nasal spray 1 spray by Each Nare route daily 19   Douglas Franco MD   cetirizine (ZYRTEC ALLERGY) 10 MG tablet Take 10 mg by mouth daily  13   Historical Provider, MD   aspirin 81 MG tablet Take 81 mg by mouth daily    Historical Provider, MD   Multiple Vitamins-Minerals (THERAPEUTIC MULTIVITAMIN-MINERALS) tablet Take 1 tablet by mouth daily    Historical Provider, MD   Omega-3 Fatty Acids (FISH OIL) 1000 MG CPDR Take 1,000 mg by mouth daily    Historical Provider, MD       Current medications:    Current Facility-Administered Medications   Medication Dose Route Frequency Provider Last Rate Last Admin    sterile water for irrigation    PRN Rodrigo HARO Lakia Correa MD   1,000 mL at 10/04/21 0710    Simethicone SUSP    PRN Tim Elaine MD   60 mL at 10/04/21 0711    0.9 % sodium chloride infusion   IntraVENous Continuous Tim Elaine MD           Allergies:     Allergies   Allergen Reactions    Amoxicillin Diarrhea     Stomach problems     Clindamycin/Lincomycin Diarrhea     Stomach problmes       Problem List:    Patient Active Problem List   Diagnosis Code    Periradicular pathology associated with previous endodontic treatment M27.59    Acquired equinus deformity of both feet M21.6X1, M21.6X2    Allergic rhinitis due to pollen J30.1    Calcaneal spur M77.30    Constipation K59.00    Depressive disorder, not elsewhere classified F32.9    Dermatitis L30.9    Dermatofibroma of right thigh D23.71    Dermatophytosis of foot B35.3    Generalized anxiety disorder F41.1    Idiopathic urticaria L50.1    Mixed hyperlipidemia E78.2    Moderate persistent asthma J45.40    Obsessive-compulsive disorder F42.9    Other seborrheic keratosis L82.1    Plantar fasciitis, left M72.2    Reflux esophagitis D87.23    Umbilical hernia Z32.8    Perirectal abscess K61.1    Bilateral carpal tunnel syndrome G56.03    Pain in left finger(s) M79.645    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    Hemorrhage of anus and rectum K62.5    Cellulitis of left finger L03.012       Past Medical History:        Diagnosis Date    Anal fistula     Arthritis     Asthma     Eczema     steroid christina work    GERD (gastroesophageal reflux disease)     Plantar fasciitis, bilateral     insoles work    Smoker     quit 2007       Past Surgical History:        Procedure Laterality Date    CARPAL TUNNEL RELEASE Right 8/2/2021    RIGHT OPEN CARPAL TUNNEL RELEASE performed by Cedric Kerr MD at [address] [place] [address]      2011, no polyps    PA I&D RECTAL SUBMUCOSAL ABSCESS N/A 2/13/2018    INCISION AND DRAINAGE PERIRECTAL RECTAL ABSCESS performed by Meche Antonio MD at [address] [place] [address] SHOULDER SURGERY Right     dislocation        Social History:    Social History     Tobacco Use    Smoking status: Former Smoker     Packs/day: 1.50     Years: 26.00     Pack years: 39.00     Quit date: 7/11/2006     Years since quitting: 15.2    Smokeless tobacco: Never Used   Substance Use Topics    Alcohol use: No                                Counseling given: Not Answered      Vital Signs (Current):   Vitals:    10/04/21 0701   BP: (!) 169/93   Pulse: 79   Resp: 18   Temp: 36.6 °C (97.8 °F)   TempSrc: Temporal   SpO2: 99%   Weight: 192 lb (87.1 kg)   Height: 5' 10\" (1.778 m)                                              BP Readings from Last 3 Encounters:   10/04/21 (!) 169/93   08/23/21 124/82   08/02/21 (!) 155/76       NPO Status: Time of last liquid consumption: 2300                        Time of last solid consumption: 1900                        Date of last liquid consumption: 10/03/21                        Date of last solid food consumption: 10/02/21    BMI:   Wt Readings from Last 3 Encounters:   10/04/21 192 lb (87.1 kg)   08/23/21 192 lb (87.1 kg)   08/16/21 192 lb (87.1 kg)     Body mass index is 27.55 kg/m². CBC:   Lab Results   Component Value Date    WBC 6.7 08/16/2021    RBC 5.49 08/16/2021    HGB 16.1 08/16/2021    HCT 46.6 08/16/2021    MCV 84.9 08/16/2021    RDW 13.5 08/16/2021     08/16/2021       CMP:   Lab Results   Component Value Date     08/16/2021    K 4.8 08/16/2021     08/16/2021    CO2 25 08/16/2021    BUN 15 08/16/2021    CREATININE 0.71 08/16/2021    GFRAA >60.0 08/16/2021    LABGLOM >60.0 08/16/2021    GLUCOSE 97 08/16/2021    PROT 7.2 08/16/2021    CALCIUM 10.2 08/16/2021    BILITOT 0.4 08/16/2021    ALKPHOS 107 08/16/2021    AST 33 08/16/2021    ALT 36 08/16/2021       POC Tests: No results for input(s): POCGLU, POCNA, POCK, POCCL, POCBUN, POCHEMO, POCHCT in the last 72 hours.     Coags: No results found for: PROTIME, INR, APTT    HCG (If Applicable): No results found for: PREGTESTUR, PREGSERUM, HCG, HCGQUANT     ABGs: No results found for: PHART, PO2ART, NYN6ARJ, VQX7IAF, BEART, L2AQBSTL     Type & Screen (If Applicable):  No results found for: LABABO, LABRH    Drug/Infectious Status (If Applicable):  No results found for: HIV, HEPCAB    COVID-19 Screening (If Applicable):   Lab Results   Component Value Date    COVID19 Not Detected 07/26/2021           Anesthesia Evaluation  Patient summary reviewed and Nursing notes reviewed  Airway: Mallampati: II  TM distance: >3 FB   Neck ROM: full  Mouth opening: > = 3 FB Dental: normal exam         Pulmonary:normal exam    (+) asthma:                            Cardiovascular:Negative CV ROS                      Neuro/Psych:   (+) neuromuscular disease:, psychiatric history:            GI/Hepatic/Renal:   (+) GERD:,           Endo/Other: Negative Endo/Other ROS                    Abdominal:             Vascular: Other Findings:             Anesthesia Plan      MAC     ASA 2             Anesthetic plan and risks discussed with patient. Plan discussed with CRNA.                   MAULIK Garnica - CRNA   10/4/2021

## 2021-10-07 ENCOUNTER — TELEPHONE (OUTPATIENT)
Dept: GASTROENTEROLOGY | Age: 56
End: 2021-10-07

## 2021-10-07 NOTE — TELEPHONE ENCOUNTER
Called patient regarding results, he has questions the 5 year recall for his colonoscopy and the polyps that were removed. Please call him. He said ti is OK to leave a detailed message.

## 2021-10-08 ENCOUNTER — TELEPHONE (OUTPATIENT)
Dept: GASTROENTEROLOGY | Age: 56
End: 2021-10-08

## 2021-10-08 NOTE — TELEPHONE ENCOUNTER
Called patient, no answer and unable to leave VM due to VM box not being set up.  If patient calls back, please transfer to me

## 2021-10-11 ENCOUNTER — TELEPHONE (OUTPATIENT)
Dept: PRIMARY CARE CLINIC | Age: 56
End: 2021-10-11

## 2021-10-11 DIAGNOSIS — J06.9 UPPER RESPIRATORY TRACT INFECTION, UNSPECIFIED TYPE: Primary | ICD-10-CM

## 2021-10-11 RX ORDER — AZITHROMYCIN 250 MG/1
TABLET, FILM COATED ORAL
Qty: 1 PACKET | Refills: 0 | Status: SHIPPED | OUTPATIENT
Start: 2021-10-11 | End: 2021-10-21

## 2021-10-11 NOTE — TELEPHONE ENCOUNTER
Called and talked to patient. Talked at length, explained results.   Surveillance in 5 years    Mary Lou Barber MD

## 2021-10-11 NOTE — TELEPHONE ENCOUNTER
He is asking for an antibiotic for a sinus infection. He does have sensitivity to clindamycin and amoxicillin.

## 2021-10-21 ENCOUNTER — OFFICE VISIT (OUTPATIENT)
Dept: ORTHOPEDIC SURGERY | Age: 56
End: 2021-10-21
Payer: COMMERCIAL

## 2021-10-21 VITALS
WEIGHT: 192 LBS | HEART RATE: 78 BPM | BODY MASS INDEX: 27.49 KG/M2 | OXYGEN SATURATION: 97 % | DIASTOLIC BLOOD PRESSURE: 100 MMHG | TEMPERATURE: 97.4 F | HEIGHT: 70 IN | SYSTOLIC BLOOD PRESSURE: 135 MMHG

## 2021-10-21 DIAGNOSIS — G56.03 BILATERAL CARPAL TUNNEL SYNDROME: ICD-10-CM

## 2021-10-21 DIAGNOSIS — Z01.818 PREOP EXAMINATION: ICD-10-CM

## 2021-10-21 DIAGNOSIS — Z01.818 PREOP EXAMINATION: Primary | ICD-10-CM

## 2021-10-21 LAB
ALBUMIN SERPL-MCNC: 4.8 G/DL (ref 3.5–4.6)
ALP BLD-CCNC: 119 U/L (ref 35–104)
ALT SERPL-CCNC: 47 U/L (ref 0–41)
ANION GAP SERPL CALCULATED.3IONS-SCNC: 12 MEQ/L (ref 9–15)
AST SERPL-CCNC: 39 U/L (ref 0–40)
BILIRUB SERPL-MCNC: 0.4 MG/DL (ref 0.2–0.7)
BUN BLDV-MCNC: 13 MG/DL (ref 6–20)
CALCIUM SERPL-MCNC: 9.8 MG/DL (ref 8.5–9.9)
CHLORIDE BLD-SCNC: 101 MEQ/L (ref 95–107)
CO2: 28 MEQ/L (ref 20–31)
CREAT SERPL-MCNC: 0.73 MG/DL (ref 0.7–1.2)
GFR AFRICAN AMERICAN: >60
GFR NON-AFRICAN AMERICAN: >60
GLOBULIN: 1.8 G/DL (ref 2.3–3.5)
GLUCOSE BLD-MCNC: 101 MG/DL (ref 70–99)
HCT VFR BLD CALC: 46.5 % (ref 42–52)
HEMOGLOBIN: 16.2 G/DL (ref 14–18)
MCH RBC QN AUTO: 29.5 PG (ref 27–31.3)
MCHC RBC AUTO-ENTMCNC: 34.8 % (ref 33–37)
MCV RBC AUTO: 84.6 FL (ref 80–100)
PDW BLD-RTO: 13.2 % (ref 11.5–14.5)
PLATELET # BLD: 280 K/UL (ref 130–400)
POTASSIUM SERPL-SCNC: 4.3 MEQ/L (ref 3.4–4.9)
RBC # BLD: 5.5 M/UL (ref 4.7–6.1)
SODIUM BLD-SCNC: 141 MEQ/L (ref 135–144)
TOTAL PROTEIN: 6.6 G/DL (ref 6.3–8)
WBC # BLD: 5.4 K/UL (ref 4.8–10.8)

## 2021-10-21 PROCEDURE — 99203 OFFICE O/P NEW LOW 30 MIN: CPT | Performed by: PHYSICIAN ASSISTANT

## 2021-10-21 RX ORDER — HYDROCODONE BITARTRATE AND ACETAMINOPHEN 5; 325 MG/1; MG/1
1 TABLET ORAL EVERY 4 HOURS PRN
Qty: 18 TABLET | Refills: 0 | Status: SHIPPED | OUTPATIENT
Start: 2021-10-21 | End: 2021-10-24

## 2021-10-21 NOTE — PATIENT INSTRUCTIONS
-please walk over to our lab for your blood work, located right outside our office near the elevators    -if you are fully vaccinated, please bring your vaccination card with you the day of surgery  -if you are not fully vaccinated, you are required to have a COVID test prior to your surgery. please ensure that you have made arrangements to get this done.   -stop taking asprin and motrin until after your surgery. All blood thinners need to be stopped at least 5 days in advance prior to surgery. -our surgery department will reach out the you the day before your surgery and let you know what time to arrive at the [address] [place] [address] Road  -no eating / drinking the after midnight the night before surgery.

## 2021-10-21 NOTE — PROGRESS NOTES
[place] [address] and Sports Medicine    H&P: Preadmission Testing     Patient: Verneta Dubin  YOB: 1965  MRN: [id number]    Subjective:     Chief Complaint   Patient presents with    Pre-op Exam     surgery with Dr Merna Alvarado on 10/18/2021       HPI: Verneta Dubin is a 60 y.o. male w/ pertinent PMHx of esophagitis possibly from hernia, asthma is here for preop evaluation for left carpal tunnel release. Relatively healthy reza, no heart disease. No significant chest pain shortness of breath racing of the heart difficulty with ambulation. Does have moderate asthma, using multiple inhalers for this. No recent exasperations. No recent Covid infections. He is fully vaccinated. Not a smoker. He is not diabetic. He has no kidney disease. Past Medical History:        Diagnosis Date    Anal fistula     Arthritis     Asthma     Eczema     steroid champ work    GERD (gastroesophageal reflux disease)     Plantar fasciitis, bilateral     insoles work    Smoker     quit 2007     Past Surgical History:    Past Surgical History:   Procedure Laterality Date    CARPAL TUNNEL RELEASE Right 8/2/2021    RIGHT OPEN CARPAL TUNNEL RELEASE performed by Mc Dorman MD at [address] [place] [address]      2011, no polyps    COLONOSCOPY N/A 10/4/2021    COLORECTAL CANCER SCREENING, NOT HIGH RISK performed by Britney Freitas MD at [address] [place] [address] I&D RECTAL SUBMUCOSAL ABSCESS N/A 2/13/2018    INCISION AND DRAINAGE PERIRECTAL RECTAL ABSCESS performed by Lisa Jiang MD at [facility] Right     dislocation        Medications Prior to Admission:    Current Outpatient Medications   Medication Sig Dispense Refill    azithromycin (ZITHROMAX Z-CHAMP) 250 MG tablet Take as directed.  1 packet 0    pantoprazole (PROTONIX) 40 MG tablet Take 1 tablet by mouth daily 90 tablet 3    [START ON 10/23/2021] fluticasone-salmeterol (ADVAIR) 250-50 MCG/DOSE AEPB Inhale 1 puff into the lungs every 12 hours 3 each 3    triamcinolone (KENALOG) 0.1 % cream Apply a thin film to the affected area twice daily until resolved. 240 g 3    Ascorbic Acid (VITAMIN C) 250 MG tablet Take 250 mg by mouth daily      Zinc 100 MG TABS Take by mouth daily      albuterol sulfate  (90 Base) MCG/ACT inhaler Inhale 2 puffs into the lungs every 6 hours as needed for Wheezing 3 Inhaler 3    fluticasone (FLONASE) 50 MCG/ACT nasal spray 1 spray by Each Nare route daily 3 Bottle 3    cetirizine (ZYRTEC ALLERGY) 10 MG tablet Take 10 mg by mouth daily       acetaminophen (TYLENOL) 325 MG tablet Take 650 mg by mouth every 6 hours as needed for Pain       aspirin 81 MG tablet Take 81 mg by mouth daily      Multiple Vitamins-Minerals (THERAPEUTIC MULTIVITAMIN-MINERALS) tablet Take 1 tablet by mouth daily      Omega-3 Fatty Acids (FISH OIL) 1000 MG CPDR Take 1,000 mg by mouth daily       No current facility-administered medications for this visit.        Allergies:    Amoxicillin and Clindamycin/lincomycin    Social History:   Social History     Socioeconomic History    Marital status: Single     Spouse name: Not on file    Number of children: Not on file    Years of education: Not on file    Highest education level: Not on file   Occupational History    Not on file   Tobacco Use    Smoking status: Former Smoker     Packs/day: 1.50     Years: 26.00     Pack years: 39.00     Quit date: 7/11/2006     Years since quitting: 15.2    Smokeless tobacco: Never Used   Vaping Use    Vaping Use: Never used   Substance and Sexual Activity    Alcohol use: No    Drug use: No    Sexual activity: Never   Other Topics Concern    Not on file   Social History Narrative    Not on file     Social Determinants of Health     Financial Resource Strain:     Difficulty of Paying Living Expenses:    Food Insecurity:     Worried About Running Out of Food in the Last Year:     [address] in the Last Year:    Transportation Needs:     Lack of Transportation (Medical):  Lack of Transportation (Non-Medical):    Physical Activity:     Days of Exercise per Week:     Minutes of Exercise per Session:    Stress:     Feeling of Stress :    Social Connections:     Frequency of Communication with Friends and Family:     Frequency of Social Gatherings with Friends and Family:     Attends Buddhism Services:     Active Member of Clubs or Organizations:     Attends Club or Organization Meetings:     Marital Status:    Intimate Partner Violence:     Fear of Current or Ex-Partner:     Emotionally Abused:     Physically Abused:     Sexually Abused:        Family History:       Problem Relation Age of Onset    Cancer Father         Pancreatic    Cancer Paternal Grandfather         Liver    Colon Cancer Neg Hx        Objective: There were no vitals taken for this visit. Physical Exam  Constitutional:       General: He is not in acute distress. Appearance: Normal appearance. He is not ill-appearing. HENT:      Head: Normocephalic. Nose: Nose normal. No congestion or rhinorrhea. Mouth/Throat:      Mouth: Mucous membranes are moist.      Pharynx: Oropharynx is clear. No oropharyngeal exudate or posterior oropharyngeal erythema. Eyes:      Extraocular Movements: Extraocular movements intact. Pupils: Pupils are equal, round, and reactive to light. Cardiovascular:      Rate and Rhythm: Normal rate and regular rhythm. Pulses: Normal pulses. Heart sounds: Normal heart sounds. Pulmonary:      Effort: Pulmonary effort is normal.      Breath sounds: Normal breath sounds. No wheezing, rhonchi or rales. Abdominal:      General: Abdomen is flat. Bowel sounds are normal.      Palpations: Abdomen is soft. Tenderness: There is no abdominal tenderness. Skin:     General: Skin is warm and dry. Capillary Refill: Capillary refill takes less than 2 seconds.       Comments: No swelling or erythema over the incision site   Neurological:      General: No focal deficit present. Mental Status: He is alert and oriented to person, place, and time. Radiographs and Laboratory Studies:       Laboratory Studies:   Lab Results   Component Value Date    WBC 6.7 08/16/2021    HGB 16.1 08/16/2021    HCT 46.6 08/16/2021    MCV 84.9 08/16/2021     08/16/2021     No results found for: Shabbir Hoover  No results found for: CRP    Assessment and Plan:      Diagnosis Orders   1. Preop examination     2. Bilateral carpal tunnel syndrome         Fully vaccinated, instructed not to take his aspirin until after surgery  Blood work  was ordered and will be reviewed. I'll see them back 2 weeks postoperatively.     Mana Calvo PA-C  [address] [place] [address] and Sports Medicine  [phone]

## 2021-10-25 ENCOUNTER — ANESTHESIA (OUTPATIENT)
Dept: OPERATING ROOM | Age: 56
End: 2021-10-25
Payer: COMMERCIAL

## 2021-10-25 ENCOUNTER — HOSPITAL ENCOUNTER (OUTPATIENT)
Age: 56
Setting detail: OUTPATIENT SURGERY
Discharge: HOME | End: 2021-10-25
Attending: ORTHOPAEDIC SURGERY | Admitting: ORTHOPAEDIC SURGERY
Payer: COMMERCIAL

## 2021-10-25 ENCOUNTER — ANESTHESIA EVENT (OUTPATIENT)
Dept: OPERATING ROOM | Age: 56
End: 2021-10-25
Payer: COMMERCIAL

## 2021-10-25 VITALS
HEART RATE: 75 BPM | BODY MASS INDEX: 27.63 KG/M2 | RESPIRATION RATE: 18 BRPM | WEIGHT: 193 LBS | HEIGHT: 70 IN | TEMPERATURE: 97 F | DIASTOLIC BLOOD PRESSURE: 90 MMHG | SYSTOLIC BLOOD PRESSURE: 141 MMHG | OXYGEN SATURATION: 99 %

## 2021-10-25 VITALS — OXYGEN SATURATION: 96 % | DIASTOLIC BLOOD PRESSURE: 74 MMHG | SYSTOLIC BLOOD PRESSURE: 164 MMHG

## 2021-10-25 DIAGNOSIS — G56.03 BILATERAL CARPAL TUNNEL SYNDROME: Primary | ICD-10-CM

## 2021-10-25 PROCEDURE — 64721 CARPAL TUNNEL SURGERY: CPT | Performed by: ORTHOPAEDIC SURGERY

## 2021-10-25 PROCEDURE — 2500000003 HC RX 250 WO HCPCS: Performed by: ORTHOPAEDIC SURGERY

## 2021-10-25 PROCEDURE — 7100000011 HC PHASE II RECOVERY - ADDTL 15 MIN: Performed by: ORTHOPAEDIC SURGERY

## 2021-10-25 PROCEDURE — 3600000003 HC SURGERY LEVEL 3 BASE: Performed by: ORTHOPAEDIC SURGERY

## 2021-10-25 PROCEDURE — 6370000000 HC RX 637 (ALT 250 FOR IP): Performed by: ANESTHESIOLOGY

## 2021-10-25 PROCEDURE — 3700000000 HC ANESTHESIA ATTENDED CARE: Performed by: ORTHOPAEDIC SURGERY

## 2021-10-25 PROCEDURE — 3700000001 HC ADD 15 MINUTES (ANESTHESIA): Performed by: ORTHOPAEDIC SURGERY

## 2021-10-25 PROCEDURE — 2580000003 HC RX 258: Performed by: ANESTHESIOLOGY

## 2021-10-25 PROCEDURE — 7100000010 HC PHASE II RECOVERY - FIRST 15 MIN: Performed by: ORTHOPAEDIC SURGERY

## 2021-10-25 PROCEDURE — 2709999900 HC NON-CHARGEABLE SUPPLY: Performed by: ORTHOPAEDIC SURGERY

## 2021-10-25 PROCEDURE — 3600000013 HC SURGERY LEVEL 3 ADDTL 15MIN: Performed by: ORTHOPAEDIC SURGERY

## 2021-10-25 PROCEDURE — 2720000010 HC SURG SUPPLY STERILE: Performed by: ORTHOPAEDIC SURGERY

## 2021-10-25 PROCEDURE — 2580000003 HC RX 258: Performed by: ORTHOPAEDIC SURGERY

## 2021-10-25 PROCEDURE — 6360000002 HC RX W HCPCS: Performed by: ORTHOPAEDIC SURGERY

## 2021-10-25 RX ORDER — HYDROCODONE BITARTRATE AND ACETAMINOPHEN 5; 325 MG/1; MG/1
1 TABLET ORAL EVERY 4 HOURS PRN
Qty: 21 TABLET | Refills: 0 | Status: SHIPPED | OUTPATIENT
Start: 2021-10-25 | End: 2021-11-01

## 2021-10-25 RX ORDER — SODIUM CHLORIDE, SODIUM LACTATE, POTASSIUM CHLORIDE, CALCIUM CHLORIDE 600; 310; 30; 20 MG/100ML; MG/100ML; MG/100ML; MG/100ML
INJECTION, SOLUTION INTRAVENOUS CONTINUOUS PRN
Status: DISCONTINUED | OUTPATIENT
Start: 2021-10-25 | End: 2021-10-25 | Stop reason: SDUPTHER

## 2021-10-25 RX ORDER — MEPERIDINE HYDROCHLORIDE 25 MG/ML
12.5 INJECTION INTRAMUSCULAR; INTRAVENOUS; SUBCUTANEOUS EVERY 5 MIN PRN
Status: DISCONTINUED | OUTPATIENT
Start: 2021-10-25 | End: 2021-10-25 | Stop reason: HOSPADM

## 2021-10-25 RX ORDER — BUPIVACAINE HYDROCHLORIDE 5 MG/ML
INJECTION, SOLUTION EPIDURAL; INTRACAUDAL PRN
Status: DISCONTINUED | OUTPATIENT
Start: 2021-10-25 | End: 2021-10-25 | Stop reason: ALTCHOICE

## 2021-10-25 RX ORDER — HYDROCODONE BITARTRATE AND ACETAMINOPHEN 5; 325 MG/1; MG/1
1 TABLET ORAL PRN
Status: COMPLETED | OUTPATIENT
Start: 2021-10-25 | End: 2021-10-25

## 2021-10-25 RX ORDER — HYDROCODONE BITARTRATE AND ACETAMINOPHEN 5; 325 MG/1; MG/1
2 TABLET ORAL PRN
Status: COMPLETED | OUTPATIENT
Start: 2021-10-25 | End: 2021-10-25

## 2021-10-25 RX ORDER — PROPOFOL 10 MG/ML
INJECTION, EMULSION INTRAVENOUS CONTINUOUS PRN
Status: DISCONTINUED | OUTPATIENT
Start: 2021-10-25 | End: 2021-10-25 | Stop reason: SDUPTHER

## 2021-10-25 RX ORDER — SODIUM CHLORIDE, SODIUM LACTATE, POTASSIUM CHLORIDE, CALCIUM CHLORIDE 600; 310; 30; 20 MG/100ML; MG/100ML; MG/100ML; MG/100ML
INJECTION, SOLUTION INTRAVENOUS CONTINUOUS
Status: DISCONTINUED | OUTPATIENT
Start: 2021-10-25 | End: 2021-10-25 | Stop reason: HOSPADM

## 2021-10-25 RX ORDER — ONDANSETRON 2 MG/ML
4 INJECTION INTRAMUSCULAR; INTRAVENOUS
Status: DISCONTINUED | OUTPATIENT
Start: 2021-10-25 | End: 2021-10-25 | Stop reason: HOSPADM

## 2021-10-25 RX ORDER — MAGNESIUM HYDROXIDE 1200 MG/15ML
LIQUID ORAL CONTINUOUS PRN
Status: COMPLETED | OUTPATIENT
Start: 2021-10-25 | End: 2021-10-25

## 2021-10-25 RX ORDER — DIPHENHYDRAMINE HYDROCHLORIDE 50 MG/ML
12.5 INJECTION INTRAMUSCULAR; INTRAVENOUS
Status: DISCONTINUED | OUTPATIENT
Start: 2021-10-25 | End: 2021-10-25 | Stop reason: HOSPADM

## 2021-10-25 RX ORDER — PROPOFOL 10 MG/ML
INJECTION, EMULSION INTRAVENOUS PRN
Status: DISCONTINUED | OUTPATIENT
Start: 2021-10-25 | End: 2021-10-25 | Stop reason: SDUPTHER

## 2021-10-25 RX ORDER — FENTANYL CITRATE 50 UG/ML
50 INJECTION, SOLUTION INTRAMUSCULAR; INTRAVENOUS EVERY 10 MIN PRN
Status: DISCONTINUED | OUTPATIENT
Start: 2021-10-25 | End: 2021-10-25 | Stop reason: HOSPADM

## 2021-10-25 RX ORDER — METOCLOPRAMIDE HYDROCHLORIDE 5 MG/ML
10 INJECTION INTRAMUSCULAR; INTRAVENOUS
Status: DISCONTINUED | OUTPATIENT
Start: 2021-10-25 | End: 2021-10-25 | Stop reason: HOSPADM

## 2021-10-25 RX ADMIN — SODIUM CHLORIDE, POTASSIUM CHLORIDE, SODIUM LACTATE AND CALCIUM CHLORIDE: 600; 310; 30; 20 INJECTION, SOLUTION INTRAVENOUS at 11:39

## 2021-10-25 RX ADMIN — PROPOFOL 30 MG: 10 INJECTION, EMULSION INTRAVENOUS at 11:53

## 2021-10-25 RX ADMIN — HYDROCODONE BITARTRATE AND ACETAMINOPHEN 2 TABLET: 5; 325 TABLET ORAL at 12:25

## 2021-10-25 RX ADMIN — CEFAZOLIN 2000 MG: 10 INJECTION, POWDER, FOR SOLUTION INTRAVENOUS at 11:43

## 2021-10-25 RX ADMIN — PROPOFOL 40 MG: 10 INJECTION, EMULSION INTRAVENOUS at 11:59

## 2021-10-25 RX ADMIN — PROPOFOL 80 MG: 10 INJECTION, EMULSION INTRAVENOUS at 11:48

## 2021-10-25 RX ADMIN — SODIUM CHLORIDE, POTASSIUM CHLORIDE, SODIUM LACTATE AND CALCIUM CHLORIDE: 600; 310; 30; 20 INJECTION, SOLUTION INTRAVENOUS at 11:22

## 2021-10-25 RX ADMIN — PROPOFOL 100 MCG/KG/MIN: 10 INJECTION, EMULSION INTRAVENOUS at 11:48

## 2021-10-25 ASSESSMENT — PULMONARY FUNCTION TESTS
PIF_VALUE: 0
PIF_VALUE: 1
PIF_VALUE: 0
PIF_VALUE: 30
PIF_VALUE: 0
PIF_VALUE: 24
PIF_VALUE: 0
PIF_VALUE: 1
PIF_VALUE: 0
PIF_VALUE: 1
PIF_VALUE: 0

## 2021-10-25 ASSESSMENT — PAIN SCALES - GENERAL: PAINLEVEL_OUTOF10: 5

## 2021-10-25 NOTE — ANESTHESIA POSTPROCEDURE EVALUATION
Department of Anesthesiology  Postprocedure Note    Patient: Julia Lujan  MRN: [id number]  YOB: 1965  Date of evaluation: 10/25/2021  Time:  12:19 PM     Procedure Summary     Date: 10/25/21 Room / Location: [address] [place] [address]    Anesthesia Start: 5479 Anesthesia Stop: 1218    Procedure: OPEN LEFT CARPAL TUNNEL RELEASE (Left ) Diagnosis: (LEFT CARPAL TUNNEL SYNDROME)    Surgeons: Peyton Adam MD Responsible Provider: Suys Rodriguez MD    Anesthesia Type: MAC ASA Status: 2          Anesthesia Type: MAC    Zev Phase I:      Zev Phase II: Zev Score: 10    Last vitals: Reviewed and per EMR flowsheets.        Anesthesia Post Evaluation    Patient location during evaluation: PACU  Patient participation: complete - patient participated  Level of consciousness: awake  Pain score: 0  Airway patency: patent  Nausea & Vomiting: no nausea and no vomiting  Complications: no  Cardiovascular status: blood pressure returned to baseline and hemodynamically stable  Respiratory status: acceptable  Hydration status: euvolemic

## 2021-10-25 NOTE — H&P
History and Physical from 10/21/21 is correct with no interval change. Will proceed with left open carpal tunnel release.

## 2021-10-25 NOTE — ANESTHESIA PRE PROCEDURE
Department of Anesthesiology  Preprocedure Note       Name:  Rosa Sims   Age:  60 y.o.  :  1965                                          MRN:  [id number]         Date:  10/25/2021      Surgeon: Rk Jamison):  Ben Patel MD    Procedure: Procedure(s):  OPEN LEFT CARPAL TUNNEL RELEASE HAND TRAY (PAT WITH SHAZIA)  MAC TIVA  1ST CASE 12:30 PM    Medications prior to admission:   Prior to Admission medications    Medication Sig Start Date End Date Taking? Authorizing Provider   pantoprazole (PROTONIX) 40 MG tablet Take 1 tablet by mouth daily 21 Yes Timmy Garcia MD   fluticasone-salmeterol (ADVAIR) 250-50 MCG/DOSE AEPB Inhale 1 puff into the lungs every 12 hours 10/23/21  Yes Timmy Garcia MD   triamcinolone (KENALOG) 0.1 % cream Apply a thin film to the affected area twice daily until resolved.  21  Yes Timmy Garcia MD   Ascorbic Acid (VITAMIN C) 250 MG tablet Take 250 mg by mouth daily   Yes Historical Provider, MD   Zinc 100 MG TABS Take by mouth daily   Yes Historical Provider, MD   albuterol sulfate  (90 Base) MCG/ACT inhaler Inhale 2 puffs into the lungs every 6 hours as needed for Wheezing 21  Yes Timmy Garcia MD   cetirizine (ZYRTEC ALLERGY) 10 MG tablet Take 10 mg by mouth daily  13  Yes Historical Provider, MD   acetaminophen (TYLENOL) 325 MG tablet Take 650 mg by mouth every 6 hours as needed for Pain    Yes Historical Provider, MD   Multiple Vitamins-Minerals (THERAPEUTIC MULTIVITAMIN-MINERALS) tablet Take 1 tablet by mouth daily   Yes Historical Provider, MD   Omega-3 Fatty Acids (FISH OIL) 1000 MG CPDR Take 1,000 mg by mouth daily   Yes Historical Provider, MD   fluticasone [facility]) 50 MCG/ACT nasal spray 1 spray by Each Nare route daily 19   Pat Andre MD   aspirin 81 MG tablet Take 81 mg by mouth daily    Historical Provider, MD       Current medications:    Current Facility-Administered Medications   Medication Dose Route Frequency Provider Last Rate Last Admin    ceFAZolin (ANCEF) 2000 mg in dextrose 5 % 100 mL IVPB  2,000 mg IntraVENous On Call to [facility] MD Ashtyn        lactated ringers infusion   IntraVENous Continuous Lisa Jerome  mL/hr at 10/25/21 1122 New Bag at 10/25/21 1122       Allergies:     Allergies   Allergen Reactions    Amoxicillin Diarrhea     Stomach problems     Clindamycin/Lincomycin Diarrhea     Stomach problmes       Problem List:    Patient Active Problem List   Diagnosis Code    Periradicular pathology associated with previous endodontic treatment M27.59    Acquired equinus deformity of both feet M21.6X1, M21.6X2    Allergic rhinitis due to pollen J30.1    Calcaneal spur M77.30    Constipation K59.00    Depressive disorder, not elsewhere classified F32.9    Dermatitis L30.9    Dermatofibroma of right thigh D23.71    Dermatophytosis of foot B35.3    Generalized anxiety disorder F41.1    Idiopathic urticaria L50.1    Mixed hyperlipidemia E78.2    Moderate persistent asthma J45.40    Obsessive-compulsive disorder F42.9    Other seborrheic keratosis L82.1    Plantar fasciitis, left M72.2    Reflux esophagitis P05.67    Umbilical hernia T66.5    Perirectal abscess K61.1    Bilateral carpal tunnel syndrome G56.03    Pain in left finger(s) M79.645    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    Hemorrhage of anus and rectum K62.5    Cellulitis of left finger L03.012       Past Medical History:        Diagnosis Date    Anal fistula     Arthritis     Asthma     Eczema     steroid christina work    GERD (gastroesophageal reflux disease)     Plantar fasciitis, bilateral     insoles work    Smoker     quit 2007       Past Surgical History:        Procedure Laterality Date    CARPAL TUNNEL RELEASE Right 8/2/2021    RIGHT OPEN CARPAL TUNNEL RELEASE performed by Brittany Nguyen MD at [facility]      2011, no polyps    COLONOSCOPY N/A 10/4/2021    COLORECTAL CANCER SCREENING, NOT HIGH RISK performed by Marcus Aguilar MD at [address] [facility] I&D RECTAL SUBMUCOSAL ABSCESS N/A 2/13/2018    INCISION AND DRAINAGE PERIRECTAL RECTAL ABSCESS performed by Abdelrahman Tomas MD at [facility] Right     dislocation        Social History:    Social History     Tobacco Use    Smoking status: Former Smoker     Packs/day: 1.50     Years: 26.00     Pack years: 39.00     Quit date: 7/11/2006     Years since quitting: 15.3    Smokeless tobacco: Never Used   Substance Use Topics    Alcohol use: No                                Counseling given: Not Answered      Vital Signs (Current):   Vitals:    10/25/21 1123   BP: (!) 162/98   Pulse: 92   Resp: 16   Temp: 98 °F (36.7 °C)   TempSrc: Temporal   SpO2: 99%   Weight: 193 lb (87.5 kg)   Height: 5' 10\" (1.778 m)                                              BP Readings from Last 3 Encounters:   10/25/21 (!) 162/98   10/21/21 (!) 135/100   10/04/21 (!) 158/96       NPO Status:                                                                                 BMI:   Wt Readings from Last 3 Encounters:   10/25/21 193 lb (87.5 kg)   10/21/21 192 lb (87.1 kg)   10/04/21 192 lb (87.1 kg)     Body mass index is 27.69 kg/m². CBC:   Lab Results   Component Value Date    WBC 5.4 10/21/2021    RBC 5.50 10/21/2021    HGB 16.2 10/21/2021    HCT 46.5 10/21/2021    MCV 84.6 10/21/2021    RDW 13.2 10/21/2021     10/21/2021       CMP:   Lab Results   Component Value Date     10/21/2021    K 4.3 10/21/2021     10/21/2021    CO2 28 10/21/2021    BUN 13 10/21/2021    CREATININE 0.73 10/21/2021    GFRAA >60.0 10/21/2021    LABGLOM >60.0 10/21/2021    GLUCOSE 101 10/21/2021    PROT 6.6 10/21/2021    CALCIUM 9.8 10/21/2021    BILITOT 0.4 10/21/2021    ALKPHOS 119 10/21/2021    AST 39 10/21/2021    ALT 47 10/21/2021       POC Tests: No results for input(s): POCGLU, POCNA, POCK, POCCL, POCBUN, POCHEMO, POCHCT in the last 72 hours.     Coags: No results found for: PROTIME, INR, APTT    HCG (If Applicable): No results found for: PREGTESTUR, PREGSERUM, HCG, HCGQUANT     ABGs: No results found for: PHART, PO2ART, QHA6VWL, APR9URQ, BEART, T1ZLGLLH     Type & Screen (If Applicable):  No results found for: LABABO, LABRH    Drug/Infectious Status (If Applicable):  No results found for: HIV, HEPCAB    COVID-19 Screening (If Applicable):   Lab Results   Component Value Date    COVID19 Not Detected 07/26/2021           Anesthesia Evaluation    Airway: Mallampati: I  TM distance: >3 FB   Neck ROM: full  Mouth opening: > = 3 FB Dental: normal exam         Pulmonary:normal exam    (+) asthma: allergic asthma,                            Cardiovascular:Negative CV ROS          ECG reviewed                        Neuro/Psych:               GI/Hepatic/Renal:   (+) GERD: well controlled,           Endo/Other:                     Abdominal:             Vascular: Other Findings:             Anesthesia Plan      MAC     ASA 2       Induction: intravenous. Anesthetic plan and risks discussed with patient.         Attending anesthesiologist reviewed and agrees with Preprocedure content              Griffin Hernandez MD   10/25/2021

## 2021-10-25 NOTE — OP NOTE
proximally. This was subsequently then released with carpal tunnel blade with protective base. Small spreading with Metzenbaum scissors fully released the median nerve. Median nerve did show increased vascularity once transverse carpal ligament had been completely released. Wound was copiously irrigated with normal saline. Skin was closed with 4-0 Prolene suture. A soft compressive dressing was applied. Tourniquet was deflated after closure and dressing application. Patient tolerated the procedure well. No complications occurred.     Electronically signed by Tova Phoenix, MD on 10/25/2021 at 12:19 PM

## 2021-10-25 NOTE — PROGRESS NOTES
Dc instructions given to pt, verbalized understanding, medicated for pain via protocol by Aminta Arizmenid, see MAR, called pt's ride to come for , gave pt supplies for dressing change at home

## 2021-11-04 ENCOUNTER — TELEPHONE (OUTPATIENT)
Dept: ORTHOPEDIC SURGERY | Age: 56
End: 2021-11-04

## 2021-11-04 DIAGNOSIS — G56.03 BILATERAL CARPAL TUNNEL SYNDROME: Primary | ICD-10-CM

## 2021-11-04 RX ORDER — HYDROCODONE BITARTRATE AND ACETAMINOPHEN 5; 325 MG/1; MG/1
1 TABLET ORAL EVERY 6 HOURS PRN
Qty: 12 TABLET | Refills: 0 | Status: SHIPPED | OUTPATIENT
Start: 2021-11-04 | End: 2021-11-07

## 2021-11-04 NOTE — TELEPHONE ENCOUNTER
Patient calling in for an Rx refill.  Patient states Hand is throbbing and would like a refill for [place].

## 2021-11-08 ENCOUNTER — OFFICE VISIT (OUTPATIENT)
Dept: ORTHOPEDIC SURGERY | Age: 56
End: 2021-11-08

## 2021-11-08 ENCOUNTER — TELEPHONE (OUTPATIENT)
Dept: PRIMARY CARE CLINIC | Age: 56
End: 2021-11-08

## 2021-11-08 VITALS
BODY MASS INDEX: 27.63 KG/M2 | WEIGHT: 193 LBS | RESPIRATION RATE: 16 BRPM | HEART RATE: 80 BPM | OXYGEN SATURATION: 97 % | HEIGHT: 70 IN | TEMPERATURE: 97.3 F

## 2021-11-08 DIAGNOSIS — K64.4 EXTERNAL HEMORRHOIDS: ICD-10-CM

## 2021-11-08 DIAGNOSIS — Z98.890 STATUS POST CARPAL TUNNEL RELEASE OF BOTH WRISTS: Primary | ICD-10-CM

## 2021-11-08 PROCEDURE — 99024 POSTOP FOLLOW-UP VISIT: CPT | Performed by: ORTHOPAEDIC SURGERY

## 2021-11-08 RX ORDER — HYDROCORTISONE 25 MG/G
CREAM TOPICAL
Qty: 28 G | Refills: 1 | Status: SHIPPED | OUTPATIENT
Start: 2021-11-08 | End: 2022-04-20

## 2021-11-08 RX ORDER — HYDROCORTISONE ACETATE 25 MG/1
25 SUPPOSITORY RECTAL 2 TIMES DAILY PRN
Qty: 12 SUPPOSITORY | Refills: 1 | Status: SHIPPED | OUTPATIENT
Start: 2021-11-08 | End: 2022-04-20 | Stop reason: SDUPTHER

## 2021-11-08 NOTE — TELEPHONE ENCOUNTER
----- Message from Jolene Balbuena sent at 11/8/2021 11:54 AM EST -----  Subject: Refill Request    QUESTIONS  Name of Medication? Other - hydrocortisone acetate suppositories 25MG (12   in box)  Patient-reported dosage and instructions? insert one every 12 hours  How many days do you have left? 0  Preferred Pharmacy? [id number]  Pharmacy phone number (if available)? [phone]  ---------------------------------------------------------------------------  --------------  Fransisco Bizanga INFO  What is the best way for the office to contact you? OK to leave message on   voicemail  Preferred Call Back Phone Number?  [phone]

## 2021-11-08 NOTE — PROGRESS NOTES
Subjective:      Patient ID: Olesya Jiang is a 60 y.o. male who presents today for:  Chief Complaint   Patient presents with    Post-Op Check     S/p OPEN LEFT CARPAL [address] - Left with Maverick Murrell MD on 10/25/2021 . Pt denies any issues since his procedure. HPI  Patient 2 weeks out status post left open carpal tunnel release reports no current issues at this time. Pain is improving.     Past Medical History:   Diagnosis Date    Anal fistula     Arthritis     Asthma     Eczema     steroid christina work    GERD (gastroesophageal reflux disease)     Plantar fasciitis, bilateral     insoles work    Smoker     quit 2007      Past Surgical History:   Procedure Laterality Date    CARPAL TUNNEL RELEASE Right 8/2/2021    RIGHT OPEN CARPAL TUNNEL RELEASE performed by Maverick Murrell MD at [facility] Left 10/25/2021    OPEN LEFT CARPAL TUNNEL RELEASE performed by Maverick Murrell MD at [address] [place] [address]      2011, no polyps    COLONOSCOPY N/A 10/4/2021    COLORECTAL CANCER SCREENING, NOT HIGH RISK performed by Maciej Flores MD at [address] [place] [address] I&D RECTAL SUBMUCOSAL ABSCESS N/A 2/13/2018    INCISION AND DRAINAGE PERIRECTAL RECTAL ABSCESS performed by Joseph Bradley MD at [facility] Right     dislocation      Social History     Socioeconomic History    Marital status: Single     Spouse name: Not on file    Number of children: Not on file    Years of education: Not on file    Highest education level: Not on file   Occupational History    Not on file   Tobacco Use    Smoking status: Former Smoker     Packs/day: 1.50     Years: 26.00     Pack years: 39.00     Quit date: 7/11/2006     Years since quitting: 15.3    Smokeless tobacco: Never Used   Vaping Use    Vaping Use: Never used   Substance and Sexual Activity    Alcohol use: No    Drug use: No    Sexual activity: Never   Other Topics Concern    Not on file   Social History Narrative    Not on file     Social Determinants of Health     Financial Resource Strain:     Difficulty of Paying Living Expenses: Not on file   Food Insecurity:     Worried About Running Out of Food in the Last Year: Not on file    Genesis of Food in the Last Year: Not on file   Transportation Needs:     Lack of Transportation (Medical): Not on file    Lack of Transportation (Non-Medical):  Not on file   Physical Activity:     Days of Exercise per Week: Not on file    Minutes of Exercise per Session: Not on file   Stress:     Feeling of Stress : Not on file   Social Connections:     Frequency of Communication with Friends and Family: Not on file    Frequency of Social Gatherings with Friends and Family: Not on file    Attends Gnosticism Services: Not on file    Active Member of [address] [place] [address] [employer] or Organizations: Not on file    Attends Club or Organization Meetings: Not on file    Marital Status: Not on file   Intimate Partner Violence:     Fear of Current or Ex-Partner: Not on file    Emotionally Abused: Not on file    Physically Abused: Not on file    Sexually Abused: Not on file   Housing Stability:     Unable to Pay for Housing in the Last Year: Not on file    Number of [place] in the Last Year: Not on file    Unstable Housing in the Last Year: Not on file     Family History   Problem Relation Age of Onset    Cancer Father         Pancreatic    Cancer Paternal Grandfather         Liver    Colon Cancer Neg Hx      Allergies   Allergen Reactions    Amoxicillin Diarrhea     Stomach problems     Clindamycin/Lincomycin Diarrhea     Stomach problmes     Current Outpatient Medications on File Prior to Visit   Medication Sig Dispense Refill    pantoprazole (PROTONIX) 40 MG tablet Take 1 tablet by mouth daily 90 tablet 3    fluticasone-salmeterol (ADVAIR) 250-50 MCG/DOSE AEPB Inhale 1 puff into the lungs every 12 hours 3 each 3    triamcinolone (KENALOG) 0.1 % cream Apply a thin film to the affected area twice daily until resolved. 240 g 3    Ascorbic Acid (VITAMIN C) 250 MG tablet Take 250 mg by mouth daily      Zinc 100 MG TABS Take by mouth daily      albuterol sulfate  (90 Base) MCG/ACT inhaler Inhale 2 puffs into the lungs every 6 hours as needed for Wheezing 3 Inhaler 3    fluticasone (FLONASE) 50 MCG/ACT nasal spray 1 spray by Each Nare route daily 3 Bottle 3    cetirizine (ZYRTEC ALLERGY) 10 MG tablet Take 10 mg by mouth daily       acetaminophen (TYLENOL) 325 MG tablet Take 650 mg by mouth every 6 hours as needed for Pain       aspirin 81 MG tablet Take 81 mg by mouth daily      Multiple Vitamins-Minerals (THERAPEUTIC MULTIVITAMIN-MINERALS) tablet Take 1 tablet by mouth daily      Omega-3 Fatty Acids (FISH OIL) 1000 MG CPDR Take 1,000 mg by mouth daily       No current facility-administered medications on file prior to visit. Review of Systems      Objective:   Pulse 80   Temp 97.3 °F (36.3 °C) (Temporal)   Resp 16   Ht 5' 10\" (1.778 m)   Wt 193 lb (87.5 kg)   SpO2 97%   BMI 27.69 kg/m²     Ortho Exam  Left upper extremity:  Skin: Intact circumferentially with healing surgical incision with mild separation of the epidermal layer with healthy dermal tissue beneath it, no swelling, ecchymosis or edema is appreciated  Neuro: Sensation intact light touch in the radial, ulnar and median nerve distribution. Able to perform all cardinal hand movements. Vascular: Strong palpable radial pulse, brisk cap refill in all digits. MSK: Patient able to make a full fist and fully extend all digits. Expected tenderness palpation appreciated about the palm minimally in nature.     Radiographs and Laboratory Studies:     Diagnostic Imaging Studies:    None    Laboratory Studies:   Lab Results   Component Value Date    WBC 5.4 10/21/2021    HGB 16.2 10/21/2021    HCT 46.5 10/21/2021    MCV 84.6 10/21/2021     10/21/2021     No results found for: SEDRATE  No results found for: CRP    Assessment:      2-week status post left open carpal tunnel release     Plan:     Patient doing well overall. Recommend lifting restriction of 5 pounds or less the left upper extremity for the next month. We will see patient back in 4 weeks for repeat evaluation. No specific wound care is required about the left hand other than to not submerge in contaminated water such as dishwater.     Tova Phoenix, MD

## 2021-11-08 NOTE — TELEPHONE ENCOUNTER
----- Message from Sadaf Balbuena sent at 11/8/2021 11:59 AM EST -----  Subject: Refill Request    QUESTIONS  Name of Medication? Other - hydrocortisone cream 2.5 %  Patient-reported dosage and instructions? apply 2x's daily   How many days do you have left? 0  Preferred Pharmacy? [id number]  Pharmacy phone number (if available)? [phone]  ---------------------------------------------------------------------------  --------------,  Name of Medication? Other - hydrocortisone suppositories 25 mg. Patient-reported dosage and instructions? every 12 hours  How many days do you have left? 0  Preferred Pharmacy? [id number]  Pharmacy phone number (if available)? [phone]  ---------------------------------------------------------------------------  --------------  Cathy BUTLER  What is the best way for the office to contact you? OK to leave message on   voicemail  Preferred Call Back Phone Number?  [phone]

## 2021-11-10 DIAGNOSIS — L03.019 CELLULITIS OF FINGER, UNSPECIFIED LATERALITY: ICD-10-CM

## 2021-11-12 ENCOUNTER — TELEPHONE (OUTPATIENT)
Dept: PRIMARY CARE CLINIC | Age: 56
End: 2021-11-12

## 2021-11-12 DIAGNOSIS — E78.00 PURE HYPERCHOLESTEROLEMIA: Primary | ICD-10-CM

## 2021-11-12 RX ORDER — ROSUVASTATIN CALCIUM 5 MG/1
5 TABLET, COATED ORAL DAILY
Qty: 90 TABLET | Refills: 3 | Status: SHIPPED | OUTPATIENT
Start: 2021-11-12

## 2021-11-12 NOTE — TELEPHONE ENCOUNTER
----- Message from Mary Lou Lu sent at 11/12/2021  2:11 PM EST -----  Subject: Message to Provider    QUESTIONS  Information for Provider? Patient is requesting script for Simvastatin be   [facility] to help with his Cholesterol. Patient has   discussed Cholesterol medication at the last appointment on 8/23/2021. Patient is requesting a low dose 90 day supply with 3 refills because [facility]   [facility].   ---------------------------------------------------------------------------  --------------  CALL BACK INFO  What is the best way for the office to contact you? OK to leave message on   voicemail  Preferred Call Back Phone Number? [phone]  ---------------------------------------------------------------------------  --------------  SCRIPT ANSWERS  Relationship to Patient?  Self

## 2021-11-12 NOTE — TELEPHONE ENCOUNTER
----- Message from Baljeet Dawn sent at 11/12/2021  2:38 PM EST -----  Subject: Message to Provider    QUESTIONS  Information for Provider? In regards to previous message per pt? Pt is   calling to go ahead and have a low dose for cholesterol medication if   possible for a 90 day supply with 3 refills along with a script for   simvastatin with 90 day supply with refill's 3 [facility]. Pt stated he spoke with an employee to @ 1:58pm for 13.35   minutes. There is no TE at the time the pt called back. ---------------------------------------------------------------------------  --------------  Tay BUTLER  What is the best way for the office to contact you? OK to leave message on   voicemail  Preferred Call Back Phone Number? [phone]  ---------------------------------------------------------------------------  --------------  SCRIPT ANSWERS  Relationship to Patient?  Self

## 2021-11-15 NOTE — TELEPHONE ENCOUNTER
He said he does not want you to prescribe another medication, they are already working on the crestor for him.

## 2021-11-15 NOTE — TELEPHONE ENCOUNTER
Patient calling back to see why Simvastatin was not called in for him. This is a 0 copay for him.  He just wondered why this wasn't called in?

## 2021-11-20 ENCOUNTER — TELEPHONE (OUTPATIENT)
Dept: PRIMARY CARE CLINIC | Age: 56
End: 2021-11-20

## 2021-11-20 NOTE — TELEPHONE ENCOUNTER
----- Message from [employer] sent at 11/12/2021  2:38 PM EST -----  Subject: Message to Provider    QUESTIONS  Information for Provider? In regards to previous message per pt? Pt is   calling to go ahead and have a low dose for cholesterol medication if   possible for a 90 day supply with 3 refills along with a script for   simvastatin with 90 day supply with refill's 3 [facility]ills [facility]. Pt stated he spoke with an employee to @ 1:58pm for 13.35   minutes. There is no TE at the time the pt called back. ---------------------------------------------------------------------------  --------------  Gavin BUTLER  What is the best way for the office to contact you? OK to leave message on   voicemail  Preferred Call Back Phone Number? [phone]  ---------------------------------------------------------------------------  --------------  SCRIPT ANSWERS  Relationship to Patient?  Self

## 2021-11-20 NOTE — TELEPHONE ENCOUNTER
----- Message from Corinn Duane sent at 11/12/2021  2:11 PM EST -----  Subject: Message to Provider    QUESTIONS  Information for Provider? Patient is requesting script for Simvastatin be   sent to [facility] to help with his Cholesterol. Patient has   discussed Cholesterol medication at the last appointment on 8/23/2021. Patient is requesting a low dose 90 day supply with 3 refills because [facility]   [facility].   ---------------------------------------------------------------------------  --------------  CALL BACK INFO  What is the best way for the office to contact you? OK to leave message on   voicemail  Preferred Call Back Phone Number? [phone]  ---------------------------------------------------------------------------  --------------  SCRIPT ANSWERS  Relationship to Patient?  Self

## 2021-12-13 ENCOUNTER — OFFICE VISIT (OUTPATIENT)
Dept: ORTHOPEDIC SURGERY | Age: 56
End: 2021-12-13

## 2021-12-13 VITALS
WEIGHT: 193 LBS | BODY MASS INDEX: 27.63 KG/M2 | OXYGEN SATURATION: 99 % | HEIGHT: 70 IN | HEART RATE: 87 BPM | RESPIRATION RATE: 16 BRPM | TEMPERATURE: 96.8 F

## 2021-12-13 DIAGNOSIS — Z98.890 S/P BILATERAL CARPAL TUNNEL RELEASE: Primary | ICD-10-CM

## 2021-12-13 PROCEDURE — 99024 POSTOP FOLLOW-UP VISIT: CPT | Performed by: ORTHOPAEDIC SURGERY

## 2021-12-13 NOTE — PROGRESS NOTES
Subjective:      Patient ID: Barry Oliveira is a 60 y.o. male who presents today for:  Chief Complaint   Patient presents with    Follow-up     from 11/8/2021, Pt is s/p OPEN LEFT CARPAL TUNNEL RELEASE - Left with Katherine Melendez MD on 10/25/2021. Pt states everythging has been good since he was last in, no issues. Pt states he just has a few questions. HPI  Patient continues to do very well overall. Notes that scar continues to decrease in both hands. Patient denies any new trauma and wants to return to push-up another physical activity at this time.     Past Medical History:   Diagnosis Date    Anal fistula     Arthritis     Asthma     Eczema     steroid christina work    GERD (gastroesophageal reflux disease)     Plantar fasciitis, bilateral     insoles work    Smoker     quit 2007      Past Surgical History:   Procedure Laterality Date    CARPAL TUNNEL RELEASE Right 8/2/2021    RIGHT OPEN CARPAL TUNNEL RELEASE performed by [facility] MD at [facility] Left 10/25/2021    OPEN LEFT CARPAL TUNNEL RELEASE performed by Katherine Melendez MD at [address] [place] [address]      2011, no polyps    COLONOSCOPY N/A 10/4/2021    COLORECTAL CANCER SCREENING, NOT HIGH RISK performed by Andrei Weir MD at [address] [place] [address] I&D RECTAL SUBMUCOSAL ABSCESS N/A 2/13/2018    INCISION AND DRAINAGE PERIRECTAL RECTAL ABSCESS performed by Shelia Hale MD at [address] [place] [address] Right     dislocation      Social History     Socioeconomic History    Marital status: Single     Spouse name: Not on file    Number of children: Not on file    Years of education: Not on file    Highest education level: Not on file   Occupational History    Not on file   Tobacco Use    Smoking status: Former Smoker     Packs/day: 1.50     Years: 26.00     Pack years: 39.00     Quit date: 7/11/2006     Years since quitting: 15.4    Smokeless tobacco: Never Used   Vaping Use    Vaping Use: Never used Substance and Sexual Activity    Alcohol use: No    Drug use: No    Sexual activity: Never   Other Topics Concern    Not on file   Social History Narrative    Not on file     Social Determinants of Health     Financial Resource Strain:     Difficulty of Paying Living Expenses: Not on file   Food Insecurity:     Worried About Running Out of Food in the Last Year: Not on file    Genesis of Food in the Last Year: Not on file   Transportation Needs:     Lack of Transportation (Medical): Not on file    Lack of Transportation (Non-Medical):  Not on file   Physical Activity:     Days of Exercise per Week: Not on file    Minutes of Exercise per Session: Not on file   Stress:     Feeling of Stress : Not on file   Social Connections:     Frequency of Communication with Friends and Family: Not on file    Frequency of Social Gatherings with Friends and Family: Not on file    Attends Sabianism Services: Not on file    Active Member of [address] [place] [address] [employer] or Organizations: Not on file    Attends Club or Organization Meetings: Not on file    Marital Status: Not on file   Intimate Partner Violence:     Fear of Current or Ex-Partner: Not on file    Emotionally Abused: Not on file    Physically Abused: Not on file    Sexually Abused: Not on file   Housing Stability:     Unable to Pay for Housing in the Last Year: Not on file    Number of [place] in the Last Year: Not on file    Unstable Housing in the Last Year: Not on file     Family History   Problem Relation Age of Onset    Cancer Father         Pancreatic    Cancer Paternal Grandfather         Liver    Colon Cancer Neg Hx      Allergies   Allergen Reactions    Amoxicillin Diarrhea     Stomach problems     Clindamycin/Lincomycin Diarrhea     Stomach problmes     Current Outpatient Medications on File Prior to Visit   Medication Sig Dispense Refill    rosuvastatin (CRESTOR) 5 MG tablet Take 1 tablet by mouth daily 90 tablet 3    hydrocortisone (ANUSOL-HC) 25 MG suppository Place 1 suppository rectally 2 times daily as needed for Hemorrhoids 12 suppository 1    hydrocortisone (ANUSOL-HC) 2.5 % CREA rectal cream Apply topical bid prn 28 g 1    pantoprazole (PROTONIX) 40 MG tablet Take 1 tablet by mouth daily 90 tablet 3    fluticasone-salmeterol (ADVAIR) 250-50 MCG/DOSE AEPB Inhale 1 puff into the lungs every 12 hours 3 each 3    triamcinolone (KENALOG) 0.1 % cream Apply a thin film to the affected area twice daily until resolved. 240 g 3    Ascorbic Acid (VITAMIN C) 250 MG tablet Take 250 mg by mouth daily      Zinc 100 MG TABS Take by mouth daily      albuterol sulfate  (90 Base) MCG/ACT inhaler Inhale 2 puffs into the lungs every 6 hours as needed for Wheezing 3 Inhaler 3    fluticasone (FLONASE) 50 MCG/ACT nasal spray 1 spray by Each Nare route daily 3 Bottle 3    cetirizine (ZYRTEC ALLERGY) 10 MG tablet Take 10 mg by mouth daily       acetaminophen (TYLENOL) 325 MG tablet Take 650 mg by mouth every 6 hours as needed for Pain       aspirin 81 MG tablet Take 81 mg by mouth daily      Multiple Vitamins-Minerals (THERAPEUTIC MULTIVITAMIN-MINERALS) tablet Take 1 tablet by mouth daily      Omega-3 Fatty Acids (FISH OIL) 1000 MG CPDR Take 1,000 mg by mouth daily       No current facility-administered medications on file prior to visit.          Review of Systems  General: Denies fever, chills  Cardiovascular: Denies chest pain  Pulmonary: Denies shortness of breath  GI: Denies nausea or vomiting  Neuro: Denies numbness or tingling    Objective:   Pulse 87   Temp 96.8 °F (36 °C) (Temporal)   Resp 16   Ht 5' 10\" (1.778 m)   Wt 193 lb (87.5 kg)   SpO2 99%   BMI 27.69 kg/m²     Ortho Exam    Left upper extremity:  Skin: Intact circumferentially with well-healed surgical incision about the volar aspect of the left palm, no swelling, ecchymosis or edema is appreciated  Neuro: Sensation intact light touch in the radial, ulnar and median nerve distribution. Able to perform all cardinal hand movements. Vascular: Strong palpable radial pulse, brisk cap refill in all digits. MSK: Minimal scar tissue appreciated about the wound edges that appear to be diminishing from last visit. Good  strength and no hyperesthesias appreciated. Right upper extremity:  Skin: Intact circumferentially, no swelling, ecchymosis or edema is appreciated  Neuro: Sensation intact light touch in the radial, ulnar and median nerve distribution. Able to perform all cardinal hand movements. Vascular: Strong palpable radial pulse, brisk cap refill in all digits. MSK: Patient has well-healed surgical incision on the volar aspect of the palm with no scar tissue palpable at this time. Excellent  strength significantly improved from preoperative examination. Radiographs and Laboratory Studies:     Diagnostic Imaging Studies:    None    Laboratory Studies:   Lab Results   Component Value Date    WBC 5.4 10/21/2021    HGB 16.2 10/21/2021    HCT 46.5 10/21/2021    MCV 84.6 10/21/2021     10/21/2021     No results found for: SEDRATE  No results found for: CRP    Assessment:      6 weeks status post left open carpal tunnel release    4-month status post right open carpal tunnel release    Plan:     Patient doing very well overall. Patient happy with his results and feels that the majority of his symptoms have already resolved from his preoperative state. Recommend follow-up as needed this juncture with no restrictions from my standpoint would be happy to see the patient again on an as-needed basis.     Austin Sigala MD

## 2022-03-24 ENCOUNTER — TELEPHONE (OUTPATIENT)
Dept: ORTHOPEDIC SURGERY | Age: 57
End: 2022-03-24

## 2022-03-24 NOTE — TELEPHONE ENCOUNTER
Patient has contacted Dr. Noe Covert office. Patient states that it has been 6mts post left open carpal tunnel release with Dr. Esme Sampson and he is still experiencing numbness at the surface of the left hand. Patient states this has affected his ability to work. 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 Patient would like to know if this feeling is normal? Patient would like a call from provider if possible.      Please advise

## 2022-03-30 NOTE — TELEPHONE ENCOUNTER
Patient states he understands message below from Dr. Yanez Speaker. Patient states that the numbness is not carpal tunnel numbness. Patient states his concern is that at the incion line on left hand towards his left thumb the skin is thick and the surface feels dead. Patient states this area is sore to the touch. Patient wants to know is this scar tissue? Patient states he is only concerned because the Right hand was worse than the left pre-surgery and the right hand feels close to normal after surgery and the left hand is what he is concerned about. Please advise.

## 2022-04-04 ENCOUNTER — TELEPHONE (OUTPATIENT)
Dept: PRIMARY CARE CLINIC | Age: 57
End: 2022-04-04

## 2022-04-04 DIAGNOSIS — Z00.00 PREVENTATIVE HEALTH CARE: Primary | ICD-10-CM

## 2022-04-04 DIAGNOSIS — J45.40 MODERATE PERSISTENT ASTHMA, UNSPECIFIED WHETHER COMPLICATED: ICD-10-CM

## 2022-04-04 RX ORDER — ALBUTEROL SULFATE 90 UG/1
2 AEROSOL, METERED RESPIRATORY (INHALATION) 4 TIMES DAILY PRN
Qty: 3 EACH | Refills: 1 | Status: SHIPPED | OUTPATIENT
Start: 2022-04-04 | End: 2022-10-01

## 2022-04-04 NOTE — TELEPHONE ENCOUNTER
----- Message from Kellie Guzman sent at 4/4/2022 10:19 AM EDT -----  Subject: Message to Provider    QUESTIONS  Information for Provider? Pt called in to request the full prostate exam   at the date of physical. He would like to have blood work completed before   the time of the appt and would also full bloodwork order. Pt feels like   with his age its time to have a full exam. Please advise   ---------------------------------------------------------------------------  --------------  CALL BACK INFO  What is the best way for the office to contact you? OK to leave message on   voicemail  Preferred Call Back Phone Number? [phone]  ---------------------------------------------------------------------------  --------------  SCRIPT ANSWERS  Relationship to Patient?  Self

## 2022-04-04 NOTE — TELEPHONE ENCOUNTER
----- Message from Virl Schilder sent at 4/4/2022 10:17 AM EDT -----  Subject: Refill Request    QUESTIONS  Name of Medication? albuterol sulfate  (90 Base) MCG/ACT inhaler  Patient-reported dosage and instructions? 90 MCG as needed   How many days do you have left? 0  Preferred Pharmacy? [id number] phone number (if available)? [phone]  Additional Information for Provider? Pt would like a 90 day supply with 3   refills due to cost.   ---------------------------------------------------------------------------  --------------  CALL BACK INFO  What is the best way for the office to contact you? OK to leave message on   voicemail  Preferred Call Back Phone Number? [phone]  ---------------------------------------------------------------------------  --------------  SCRIPT ANSWERS  Relationship to Patient?  Self

## 2022-04-20 DIAGNOSIS — J45.40 MODERATE PERSISTENT ASTHMA, UNSPECIFIED WHETHER COMPLICATED: ICD-10-CM

## 2022-04-20 DIAGNOSIS — K64.4 EXTERNAL HEMORRHOIDS: ICD-10-CM

## 2022-04-20 RX ORDER — HYDROCORTISONE ACETATE 25 MG/1
25 SUPPOSITORY RECTAL 2 TIMES DAILY PRN
Qty: 12 SUPPOSITORY | Refills: 3 | Status: SHIPPED | OUTPATIENT
Start: 2022-04-20

## 2022-04-20 RX ORDER — HYDROCORTISONE 25 MG/G
CREAM TOPICAL
Qty: 28 G | Refills: 3 | Status: SHIPPED | OUTPATIENT
Start: 2022-04-20

## 2022-04-20 RX ORDER — ALBUTEROL SULFATE 90 UG/1
2 AEROSOL, METERED RESPIRATORY (INHALATION) EVERY 6 HOURS PRN
Qty: 1 EACH | Refills: 5 | Status: SHIPPED | OUTPATIENT
Start: 2022-04-20

## 2022-04-20 NOTE — TELEPHONE ENCOUNTER
Pt had physcial/yearly in August and has his appt scheduled for august already. Can you please do these rx for 90 days with 3 refills.   Please advise

## 2022-08-01 ENCOUNTER — OFFICE VISIT (OUTPATIENT)
Dept: ORTHOPEDIC SURGERY | Age: 57
End: 2022-08-01
Payer: COMMERCIAL

## 2022-08-01 VITALS — WEIGHT: 193 LBS | HEIGHT: 70 IN | HEART RATE: 92 BPM | BODY MASS INDEX: 27.63 KG/M2 | OXYGEN SATURATION: 98 %

## 2022-08-01 DIAGNOSIS — M65.4 RADIAL STYLOID TENOSYNOVITIS OF LEFT HAND: Primary | ICD-10-CM

## 2022-08-01 PROCEDURE — 20550 NJX 1 TENDON SHEATH/LIGAMENT: CPT | Performed by: PHYSICIAN ASSISTANT

## 2022-08-01 PROCEDURE — L3923 HFO WITHOUT JOINTS PRE CST: HCPCS | Performed by: PHYSICIAN ASSISTANT

## 2022-08-01 PROCEDURE — 99214 OFFICE O/P EST MOD 30 MIN: CPT | Performed by: PHYSICIAN ASSISTANT

## 2022-08-01 RX ORDER — BETAMETHASONE SODIUM PHOSPHATE AND BETAMETHASONE ACETATE 3; 3 MG/ML; MG/ML
6 INJECTION, SUSPENSION INTRA-ARTICULAR; INTRALESIONAL; INTRAMUSCULAR; SOFT TISSUE ONCE
Status: COMPLETED | OUTPATIENT
Start: 2022-08-01 | End: 2022-08-01

## 2022-08-01 RX ORDER — LIDOCAINE HYDROCHLORIDE 10 MG/ML
1 INJECTION, SOLUTION INFILTRATION; PERINEURAL ONCE
Status: COMPLETED | OUTPATIENT
Start: 2022-08-01 | End: 2022-08-01

## 2022-08-01 RX ADMIN — LIDOCAINE HYDROCHLORIDE 1 ML: 10 INJECTION, SOLUTION INFILTRATION; PERINEURAL at 12:26

## 2022-08-01 RX ADMIN — BETAMETHASONE SODIUM PHOSPHATE AND BETAMETHASONE ACETATE 6 MG: 3; 3 INJECTION, SUSPENSION INTRA-ARTICULAR; INTRALESIONAL; INTRAMUSCULAR; SOFT TISSUE at 12:31

## 2022-08-01 ASSESSMENT — ENCOUNTER SYMPTOMS: RESPIRATORY NEGATIVE: 1

## 2022-08-01 NOTE — PROGRESS NOTES
Surjit Lackey (:  1965) is a 60 y.o. male,New patient, here for evaluation of the following chief complaint(s):  Follow-up (Left hand pain, had surgery with Dr Anaebl Mcneil 10 months go, needed Monday morning appointment. )         ASSESSMENT/PLAN:  1. Radial styloid tenosynovitis of left hand  -     Hfo without joints pre cst 2      No follow-ups on file. 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 States he had right carpal tunnel release surgery performed 1 year ago and left carpal tunnel release surgery performed 10 months ago by Dr. Anabel Mcneil. States his numbness improved significantly. Complains of left thumb pain with certain movements. He denies weakness or change in sensation. Review of Systems   Constitutional: Negative. HENT: Negative. Respiratory: Negative. Skin: Negative. Neurological: Negative. Objective   Physical Exam  Musculoskeletal:      Comments: Left hand-no swelling or ecchymosis. No warmth, erythema, fluctuance or sign of infection. Full extension, good flexion range of motion. CMC joint is nontender. No CMC grind. Finkelstein's is positive. Sensation is intact distally to light touch. Capillary refill is brisk. Procedure-after alcohol prep 1 cc of 1% lidocaine and 1/2 cc of 12 mg/mL Celestone are injected into the extensor tendon sheath of the left thumb. Patient tolerated procedure well     Explained to the patient he has radial styloid tenosynovitis. Do not believe that is related to his carpal tunnel release surgery. I injected today with cortisone. He is placed into a supportive thumb spica brace. I printed out exercises for him to do. He will begin ice massages. He will begin taking 2 Aleve twice daily. He is to follow-up with me in 2 weeks. Should his symptoms not improve sufficiently Occupational Therapy may be warranted.   On this date 2022 I have spent 35 minutes reviewing previous notes, test results and face to face with the patient discussing the diagnosis and importance of compliance with the treatment plan as well as documenting on the day of the visit. An electronic signature was used to authenticate this note.     --CONNIE Lara

## 2022-08-15 ENCOUNTER — OFFICE VISIT (OUTPATIENT)
Dept: ORTHOPEDIC SURGERY | Age: 57
End: 2022-08-15
Payer: COMMERCIAL

## 2022-08-15 VITALS
WEIGHT: 193 LBS | TEMPERATURE: 97.8 F | HEIGHT: 70 IN | BODY MASS INDEX: 27.63 KG/M2 | OXYGEN SATURATION: 97 % | HEART RATE: 88 BPM

## 2022-08-15 DIAGNOSIS — M65.4 RADIAL STYLOID TENOSYNOVITIS OF LEFT HAND: Primary | ICD-10-CM

## 2022-08-15 DIAGNOSIS — J01.11 ACUTE RECURRENT FRONTAL SINUSITIS: ICD-10-CM

## 2022-08-15 PROCEDURE — 99214 OFFICE O/P EST MOD 30 MIN: CPT | Performed by: PHYSICIAN ASSISTANT

## 2022-08-15 RX ORDER — AZITHROMYCIN 250 MG/1
TABLET, FILM COATED ORAL
Qty: 5 TABLET | Refills: 0 | Status: SHIPPED | OUTPATIENT
Start: 2022-08-15 | End: 2022-11-03 | Stop reason: SDUPTHER

## 2022-08-15 ASSESSMENT — ENCOUNTER SYMPTOMS: RESPIRATORY NEGATIVE: 1

## 2022-08-15 NOTE — PROGRESS NOTES
Cloyd Osgood (:  1965) is a 60 y.o. male,Established patient, here for evaluation of the following chief complaint(s):  Follow-up (Pt presents to F/U on his left thumb pain. He had an injection. He states his thumb is better. He is having pain in his thumb pad.)         ASSESSMENT/PLAN:  1. Radial styloid tenosynovitis of left hand  2. Acute recurrent frontal sinusitis  -     azithromycin (ZITHROMAX) 250 MG tablet; 500mg on day 1 followed by 250mg on days 2 - 5, Disp-5 tablet, R-0Normal      Return if symptoms worsen or fail to improve. 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  He has been complaining of left sided thumb pain. I injected the extensor tendon sheath for radial styloid tenosynovitis. He doing much better. He has no pain presently. He is having some sinus pain and pressure. He states he was not feeling very good yesterday. He states he usually takes a Z-Junito and not helps considerably. He denies any numbness or tingling in either hand after his carpal tunnel release surgery. Review of Systems   Constitutional: Negative. HENT: Negative. Respiratory: Negative. Skin: Negative. Neurological: Negative. Objective   Physical Exam  Musculoskeletal:      Comments: Left hand-no swelling or ecchymosis. Hand grasp is strong and equal.  Finkelstein's is negative. CMC joint is nontender. There is no CMC grind. There is mild thenar wasting. Phalen's is negative. Tinel's is negative. Sensation is intact distally to light touch. Capillary refill is brisk. Patient's pain has entirely resolved since his cortisone injection for his radial styloid tenosynovitis. He is having some sinusitis type symptoms. He will begin a Z-Junito is at has been beneficial previously. We did discuss exercises to avoid to prevent the radial styloid tenosynovitis from returning. We will follow-up as his symptoms dictate.        On this date 8/15/2022 I have spent 33 minutes reviewing previous notes, test results and face to face with the patient discussing the diagnosis and importance of compliance with the treatment plan as well as documenting on the day of the visit. An electronic signature was used to authenticate this note.     --CONNIE Ramos

## 2022-11-03 ENCOUNTER — TELEPHONE (OUTPATIENT)
Dept: PRIMARY CARE CLINIC | Age: 57
End: 2022-11-03

## 2022-11-03 DIAGNOSIS — J01.11 ACUTE RECURRENT FRONTAL SINUSITIS: ICD-10-CM

## 2022-11-03 RX ORDER — AZITHROMYCIN 250 MG/1
TABLET, FILM COATED ORAL
Qty: 5 TABLET | Refills: 0 | Status: SHIPPED | OUTPATIENT
Start: 2022-11-03

## 2022-11-03 NOTE — TELEPHONE ENCOUNTER
Patient calling in requesting an antiobiotic/medication due to a sinus infection since Sunday. Patient is currently on an inhaler and wanted to mention it.      Patient has not been seen since august of 2021 due to insurance issue, now in network again so set up an appointment for Monday 11/07    Please advise

## 2023-01-23 ENCOUNTER — OFFICE VISIT (OUTPATIENT)
Dept: ORTHOPEDIC SURGERY | Age: 58
End: 2023-01-23
Payer: COMMERCIAL

## 2023-01-23 VITALS — WEIGHT: 193 LBS | HEIGHT: 63 IN | BODY MASS INDEX: 34.2 KG/M2

## 2023-01-23 DIAGNOSIS — M65.4 RADIAL STYLOID TENOSYNOVITIS OF LEFT HAND: Primary | ICD-10-CM

## 2023-01-23 PROCEDURE — 99214 OFFICE O/P EST MOD 30 MIN: CPT | Performed by: PHYSICIAN ASSISTANT

## 2023-01-23 PROCEDURE — 20550 NJX 1 TENDON SHEATH/LIGAMENT: CPT | Performed by: PHYSICIAN ASSISTANT

## 2023-01-23 RX ORDER — VALSARTAN 160 MG/1
TABLET ORAL
COMMUNITY
Start: 2023-01-19

## 2023-01-23 RX ORDER — LIDOCAINE HYDROCHLORIDE 10 MG/ML
1 INJECTION, SOLUTION INFILTRATION; PERINEURAL ONCE
Status: COMPLETED | OUTPATIENT
Start: 2023-01-23 | End: 2023-01-23

## 2023-01-23 RX ORDER — TRIAMCINOLONE ACETONIDE 40 MG/ML
40 INJECTION, SUSPENSION INTRA-ARTICULAR; INTRAMUSCULAR ONCE
Status: COMPLETED | OUTPATIENT
Start: 2023-01-23 | End: 2023-01-23

## 2023-01-23 RX ORDER — FLUTICASONE PROPIONATE AND SALMETEROL 50; 500 UG/1; UG/1
POWDER RESPIRATORY (INHALATION)
COMMUNITY
Start: 2022-11-14

## 2023-01-23 RX ADMIN — TRIAMCINOLONE ACETONIDE 40 MG: 40 INJECTION, SUSPENSION INTRA-ARTICULAR; INTRAMUSCULAR at 11:23

## 2023-01-23 RX ADMIN — LIDOCAINE HYDROCHLORIDE 1 ML: 10 INJECTION, SOLUTION INFILTRATION; PERINEURAL at 11:23

## 2023-01-23 ASSESSMENT — ENCOUNTER SYMPTOMS: RESPIRATORY NEGATIVE: 1

## 2023-01-23 NOTE — PROGRESS NOTES
Yang Winston (:  1965) is a 59 y.o. male,Established patient, here for evaluation of the following chief complaint(s):  Follow-up (Patient presents for a cortisone injection left thumb. )         ASSESSMENT/PLAN:  1. Radial styloid tenosynovitis of left hand      No follow-ups on file. 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  He complains of left thumb pain. I have treated him previously for radial styloid tenosynovitis. He has been performing his home exercise program.  He has been wearing his Comfort Cool brace at nighttime. He denies any new injury however he does a lot of repetitive actions with his left hand. Review of Systems   Constitutional: Negative. HENT: Negative. Respiratory: Negative. Skin: Negative. Neurological: Negative. Objective   Physical Exam  Musculoskeletal:      Comments: Left hand-no swelling or ecchymosis. No warmth, erythema, fluctuance or sign of infection. Hand grasp is strong and equal.  Finkelstein's is positive. Sensation is intact distally to light touch. No thenar wasting. Capillary refill is brisk. Procedure-after sterile prep and under aseptic technique 1 cc 1% lidocaine and 1 cc of 40 mg/mL Kenalog are injected into the extensor tendon sheath of the left thumb. Patient tolerated procedure well. Explained to the patient does have recurrent radial styloid tenosynovitis. I injected with cortisone today. I recommended a more supportive thumb spica brace the patient will purchase over-the-counter. He may use topical anti-inflammatory creams. We did discuss radial styloid tenosynovitis decompression. He does not want to proceed with surgery at this time.        On this date 2023 I have spent 35 minutes reviewing previous notes, test results and face to face with the patient discussing the diagnosis and importance of compliance with the treatment plan as well as documenting on the day of the visit. An electronic signature was used to authenticate this note.     --CONNIE Grayson

## 2023-03-19 PROBLEM — K21.9 GERD (GASTROESOPHAGEAL REFLUX DISEASE): Status: ACTIVE | Noted: 2023-03-19

## 2023-03-19 PROBLEM — F41.8 ANXIETY ABOUT HEALTH: Status: ACTIVE | Noted: 2023-03-19

## 2023-03-19 PROBLEM — R09.81 SINUS CONGESTION: Status: ACTIVE | Noted: 2023-03-19

## 2023-03-19 PROBLEM — R73.9 BLOOD GLUCOSE ELEVATED: Status: ACTIVE | Noted: 2023-03-19

## 2023-03-19 PROBLEM — G89.29 CHRONIC RIGHT SHOULDER PAIN: Status: ACTIVE | Noted: 2023-03-19

## 2023-03-19 PROBLEM — M25.519 SHOULDER PAIN: Status: ACTIVE | Noted: 2023-03-19

## 2023-03-19 PROBLEM — E78.5 HYPERLIPIDEMIA: Status: ACTIVE | Noted: 2023-03-19

## 2023-03-19 PROBLEM — R06.2 WHEEZING: Status: ACTIVE | Noted: 2023-03-19

## 2023-03-19 PROBLEM — K64.9 HEMORRHOIDS: Status: ACTIVE | Noted: 2023-03-19

## 2023-03-19 PROBLEM — M25.511 CHRONIC RIGHT SHOULDER PAIN: Status: ACTIVE | Noted: 2023-03-19

## 2023-03-19 PROBLEM — M19.011 PRIMARY OSTEOARTHRITIS OF RIGHT SHOULDER: Status: ACTIVE | Noted: 2023-03-19

## 2023-03-19 PROBLEM — I10 BENIGN ESSENTIAL HYPERTENSION: Status: ACTIVE | Noted: 2023-03-19

## 2023-03-19 PROBLEM — R05.9 COUGH: Status: ACTIVE | Noted: 2023-03-19

## 2023-03-19 PROBLEM — J45.909 ASTHMA (HHS-HCC): Status: ACTIVE | Noted: 2023-03-19

## 2023-03-19 PROBLEM — L30.9 ECZEMA: Status: ACTIVE | Noted: 2023-03-19

## 2023-03-19 RX ORDER — ACETAMINOPHEN 325 MG/1
650 TABLET ORAL EVERY 6 HOURS PRN
COMMUNITY
Start: 2022-05-09

## 2023-03-19 RX ORDER — TRIAMCINOLONE ACETONIDE 1 MG/G
CREAM TOPICAL
COMMUNITY
Start: 2022-05-09

## 2023-03-19 RX ORDER — CELECOXIB 200 MG/1
1 CAPSULE ORAL DAILY PRN
COMMUNITY
Start: 2023-02-22 | End: 2023-05-22

## 2023-03-19 RX ORDER — ROSUVASTATIN CALCIUM 5 MG/1
1 TABLET, COATED ORAL
COMMUNITY
Start: 2022-05-09 | End: 2024-03-01 | Stop reason: SDUPTHER

## 2023-03-19 RX ORDER — FLUTICASONE PROPIONATE 50 MCG
2 SPRAY, SUSPENSION (ML) NASAL
COMMUNITY
Start: 2022-05-09

## 2023-03-19 RX ORDER — PANTOPRAZOLE SODIUM 40 MG/1
1 TABLET, DELAYED RELEASE ORAL 2 TIMES DAILY
COMMUNITY
Start: 2022-05-09 | End: 2024-03-01 | Stop reason: SDUPTHER

## 2023-03-19 RX ORDER — ASPIRIN 81 MG/1
81 TABLET ORAL
COMMUNITY

## 2023-03-19 RX ORDER — ALBUTEROL SULFATE 90 UG/1
AEROSOL, METERED RESPIRATORY (INHALATION)
COMMUNITY
Start: 2022-05-09 | End: 2023-08-28 | Stop reason: SDUPTHER

## 2023-03-19 RX ORDER — HYDROCORTISONE 25 MG/G
CREAM TOPICAL
COMMUNITY
Start: 2022-05-23 | End: 2023-09-07 | Stop reason: SDUPTHER

## 2023-03-19 RX ORDER — VALSARTAN 160 MG/1
1 TABLET ORAL
COMMUNITY
Start: 2022-11-21 | End: 2023-11-30 | Stop reason: SDUPTHER

## 2023-03-19 RX ORDER — OMEGA-3/DHA/EPA/FISH OIL 300-1000MG
1 CAPSULE,DELAYED RELEASE (ENTERIC COATED) ORAL
COMMUNITY
Start: 2022-05-09

## 2023-03-19 RX ORDER — FLUTICASONE PROPIONATE AND SALMETEROL 500; 50 UG/1; UG/1
1 POWDER RESPIRATORY (INHALATION)
COMMUNITY
Start: 2022-05-09 | End: 2023-11-29 | Stop reason: SDUPTHER

## 2023-03-24 ENCOUNTER — TELEPHONE (OUTPATIENT)
Dept: PRIMARY CARE | Facility: CLINIC | Age: 58
End: 2023-03-24
Payer: COMMERCIAL

## 2023-03-24 DIAGNOSIS — F41.8 ANXIETY ABOUT HEALTH: ICD-10-CM

## 2023-03-24 NOTE — TELEPHONE ENCOUNTER
----- Message from ELMER Aguilar sent at 3/24/2023  1:28 PM EDT -----  Regarding: FW: Pt needs new COCM order referral placed in EPIC for this patient asap.  Hi,  Can you please assist with getting this referral to me before Monday at 9:30 when I'm scheduled to see the patient?     Thank you!  Court  ----- Message -----  From: ELMER Aguilar  Sent: 3/23/2023  12:59 PM EDT  To: Jan Darby MD  Subject: Pt needs new COCM order referral placed in E#    Hello,    Can someone assist with making sure Dr. Darby submits an order for COCM services for this patient this week?    Thank you,   Court

## 2023-03-27 ENCOUNTER — SOCIAL WORK (OUTPATIENT)
Dept: PRIMARY CARE | Facility: CLINIC | Age: 58
End: 2023-03-27
Payer: COMMERCIAL

## 2023-03-27 ASSESSMENT — PATIENT HEALTH QUESTIONNAIRE - PHQ9
8. MOVING OR SPEAKING SO SLOWLY THAT OTHER PEOPLE COULD HAVE NOTICED. OR THE OPPOSITE, BEING SO FIGETY OR RESTLESS THAT YOU HAVE BEEN MOVING AROUND A LOT MORE THAN USUAL: NOT AT ALL
2. FEELING DOWN, DEPRESSED OR HOPELESS: SEVERAL DAYS
1. LITTLE INTEREST OR PLEASURE IN DOING THINGS: SEVERAL DAYS
SUM OF ALL RESPONSES TO PHQ QUESTIONS 1-9: 6
SUM OF ALL RESPONSES TO PHQ9 QUESTIONS 1 & 2: 2
7. TROUBLE CONCENTRATING ON THINGS, SUCH AS READING THE NEWSPAPER OR WATCHING TELEVISION: MORE THAN HALF THE DAYS
4. FEELING TIRED OR HAVING LITTLE ENERGY: NOT AT ALL
6. FEELING BAD ABOUT YOURSELF - OR THAT YOU ARE A FAILURE OR HAVE LET YOURSELF OR YOUR FAMILY DOWN: MORE THAN HALF THE DAYS
5. POOR APPETITE OR OVEREATING: NOT AT ALL
9. THOUGHTS THAT YOU WOULD BE BETTER OFF DEAD, OR OF HURTING YOURSELF: NOT AT ALL
10. IF YOU CHECKED OFF ANY PROBLEMS, HOW DIFFICULT HAVE THESE PROBLEMS MADE IT FOR YOU TO DO YOUR WORK, TAKE CARE OF THINGS AT HOME, OR GET ALONG WITH OTHER PEOPLE: SOMEWHAT DIFFICULT
3. TROUBLE FALLING OR STAYING ASLEEP: NOT AT ALL

## 2023-03-27 ASSESSMENT — ANXIETY QUESTIONNAIRES
1. FEELING NERVOUS, ANXIOUS, OR ON EDGE: MORE THAN HALF THE DAYS
7. FEELING AFRAID AS IF SOMETHING AWFUL MIGHT HAPPEN: NOT AT ALL
6. BECOMING EASILY ANNOYED OR IRRITABLE: SEVERAL DAYS
IF YOU CHECKED OFF ANY PROBLEMS ON THIS QUESTIONNAIRE, HOW DIFFICULT HAVE THESE PROBLEMS MADE IT FOR YOU TO DO YOUR WORK, TAKE CARE OF THINGS AT HOME, OR GET ALONG WITH OTHER PEOPLE: SOMEWHAT DIFFICULT
5. BEING SO RESTLESS THAT IT IS HARD TO SIT STILL: MORE THAN HALF THE DAYS
4. TROUBLE RELAXING: MORE THAN HALF THE DAYS
GAD7 TOTAL SCORE: 10
2. NOT BEING ABLE TO STOP OR CONTROL WORRYING: SEVERAL DAYS
3. WORRYING TOO MUCH ABOUT DIFFERENT THINGS: MORE THAN HALF THE DAYS
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
## 2023-03-31 ENCOUNTER — DOCUMENTATION (OUTPATIENT)
Dept: BEHAVIORAL HEALTH | Facility: CLINIC | Age: 58
End: 2023-03-31

## 2023-03-31 ENCOUNTER — DOCUMENTATION (OUTPATIENT)
Dept: PRIMARY CARE | Facility: CLINIC | Age: 58
End: 2023-03-31
Payer: COMMERCIAL

## 2023-03-31 DIAGNOSIS — F41.1 GENERALIZED ANXIETY DISORDER: Primary | ICD-10-CM

## 2023-03-31 PROCEDURE — 99492 1ST PSYC COLLAB CARE MGMT: CPT | Performed by: FAMILY MEDICINE

## 2023-03-31 PROCEDURE — 99494 1ST/SBSQ PSYC COLLAB CARE: CPT | Performed by: FAMILY MEDICINE

## 2023-03-31 NOTE — PROGRESS NOTES
[facility] Psychiatry Consult Note     Ha Goldberg is a 57 y.o., referred to Collaborative Care for symptoms of anxiety & depression in the context of health challenges as well as remote trauma. I have reviewed the patient with the behavioral health manager and reviewed the patient's electronic record. Pertinent highlights of discussion and chart review include the following:   Referred by PCP after disclosing health-related concerns associated with upcoming shoulder surgery (ability to handle tasks in a sling), with resulting depressive symptoms (low mood, loss of interest, feeling broken / damaged). Longstanding focus on others' evaluation of him. Increased anxiety over the prior year due to conflict with a neighbor who now works the same job and shift as he does (leading to pt altering schedule / habits to minimize interactions. Longstanding need for cleanliness / handwashing; frequently checks phone alarm due to fear of oversleeping.   Childhood traumas due to poor relationship with father. Pt reported trying to get love / acceptance from dad but never got it; acted out at school as a result.   Sleep: some difficulty with sleep initiation; no significant problems with sleep maintenance - will sometimes wake up and snack overnight  Substances: not pertinent at present (prior substance use in adolescence)   Current psych medications: none; open to consideration but hesitant due to fears of side effects and concern for emotional blunting. Started seeing a therapist last week (community counseling).   Prior psych medications: multiple prior (remote) medications - possibly sertraline / clonazepam / buspirone; patient unsure of doses / duration / effects    Patient Health Questionnaire-9 Score: 6 (3/27/2023  9:35 AM)  SARAN-7 Total Score: 10 (3/27/2023  1:36 PM)     Additional data:   Recent labs: CBC not pertinent 8/15/22   Pertinent PMH: asthma, hypertension, GERD, hyperlipidemia     Recommendations:   Behavioral health  manager (M) to continue to engage with patient   If patient is interested in medication initiation, would recommend initiation of fluoxetine 20mg once daily. Continue treatment at that dose and expect further improvements over the next 4-6 weeks; if symptoms remain problematic would increase in 20mg increments every 4-6 weeks as tolerated until appropriate treatment response has been achieved, to maximum dose of 80mg daily. If this level of improvement is inadequate, it would be appropriate to try another SSRI/SNRI. Common adverse effects usually resolve within days to two weeks, and include GI upset / fatigue or insomnia / dizziness / agitation / tremors / dry mouth / sweating. If patient experiences worsening insomnia would recommend retiming administration to the morning. Sexual dysfunction may occur and is unlikely to self-resolve.  Recommend BHM use motivational interviewing to facilitate healthy lifestyle choices (e.g. reduction / abstinence from marijuana, adherence to medications, consistent engagement with mental health services), and provide support regarding identity struggle and childhood trauma as discussed in case review    The above treatment considerations and suggestions are based on consultations with the patient's care manager and a review of information available in the electronic medical record. I have not personally examined the patient. All recommendations should be implemented with consideration of the patient's relevant prior history and current clinical status. Please feel free to call me with any questions about the care of this patient. I can easily be reached through [employer], or via email ([email or web address]).

## 2023-03-31 NOTE — Clinical Note
FYI regarding my recommendations based on our team's discussion in collaborative care; in brief it's unclear whether patient desires pharmacotherapy at this time - if so, I'm recommending starting with fluoxetine 20mg.   Please feel free to reach out with any questions / comments / feedback / concerns.   Paulo Buchanan

## 2023-04-03 ENCOUNTER — DOCUMENTATION (OUTPATIENT)
Dept: PRIMARY CARE | Facility: CLINIC | Age: 58
End: 2023-04-03
Payer: COMMERCIAL

## 2023-04-03 NOTE — PROGRESS NOTES
Collaborative Care (CoCM)  Progress Note    Type of Interaction: Phone    Start Time: 1:00 PM    End Time: 1:13 PM        Appointment: Not Scheduled    Reason for Visit:   Chief Complaint   Patient presents with    Anxiety    Questions about services and if COCM services seem appropriate for him.      Interval History / Patient Symptoms:     Patient Health Questionnaire-9 Score: 6 (3/27/2023  9:35 AM)  SARAN-7 Total Score: 10 (3/27/2023  1:36 PM)        Interventions Provided: Psychoeducation and Treatment Planning  Psychoeducation provided on what to expect from COCM services.  [facility] answered pt's questions and encouraged pt to continue to COCM MH services.      Progress Made: None    Response to Intervention: Pt discussed having some apprehension about other MH services he has explored in the community.  Pt asking if COCM seems appropriate based on initial assessment.  Pt pleased to here that COCM is recommended and reported looking forward to next appointment.  Pt indicated he wants to work on not feeling so fearful inside.      Plan: Identify specific goals pt would like to work toward and/or things he would like to see changed in he next 3-6 months.      Follow Up / Next Appointment:Next appointment: 04/24/23

## 2023-04-10 ENCOUNTER — APPOINTMENT (OUTPATIENT)
Dept: PRIMARY CARE | Facility: CLINIC | Age: 58
End: 2023-04-10
Payer: COMMERCIAL

## 2023-04-24 ENCOUNTER — APPOINTMENT (OUTPATIENT)
Dept: PRIMARY CARE | Facility: CLINIC | Age: 58
End: 2023-04-24
Payer: COMMERCIAL

## 2023-04-24 ENCOUNTER — TELEPHONE (OUTPATIENT)
Dept: PRIMARY CARE | Facility: CLINIC | Age: 58
End: 2023-04-24

## 2023-04-28 ENCOUNTER — DOCUMENTATION (OUTPATIENT)
Dept: PRIMARY CARE | Facility: CLINIC | Age: 58
End: 2023-04-28
Payer: COMMERCIAL

## 2023-04-28 DIAGNOSIS — F41.8 ANXIETY ABOUT HEALTH: Primary | ICD-10-CM

## 2023-04-28 PROCEDURE — G2214 INIT/SUB PSYCH CARE M 1ST 30: HCPCS | Performed by: FAMILY MEDICINE

## 2023-05-02 ENCOUNTER — TELEPHONE (OUTPATIENT)
Dept: PRIMARY CARE | Facility: CLINIC | Age: 58
End: 2023-05-02
Payer: COMMERCIAL

## 2023-05-02 NOTE — TELEPHONE ENCOUNTER
Pt called [facility] back and said he couldn't reschedule for this week d/t work obligations.  Pt asked to be scheduled on 5/15 when he is coming in for lab blood draw.  Pt re-scheduled for 5/15/23 at 10:00 AM.

## 2023-05-08 ENCOUNTER — APPOINTMENT (OUTPATIENT)
Dept: PRIMARY CARE | Facility: CLINIC | Age: 58
End: 2023-05-08
Payer: COMMERCIAL

## 2023-05-15 ENCOUNTER — SOCIAL WORK (OUTPATIENT)
Dept: PRIMARY CARE | Facility: CLINIC | Age: 58
End: 2023-05-15
Payer: COMMERCIAL

## 2023-05-15 LAB
ESTIMATED AVERAGE GLUCOSE FOR HBA1C: 105 MG/DL
HEMOGLOBIN A1C/HEMOGLOBIN TOTAL IN BLOOD: 5.3 %

## 2023-05-15 ASSESSMENT — PATIENT HEALTH QUESTIONNAIRE - PHQ9
7. TROUBLE CONCENTRATING ON THINGS, SUCH AS READING THE NEWSPAPER OR WATCHING TELEVISION: NOT AT ALL
10. IF YOU CHECKED OFF ANY PROBLEMS, HOW DIFFICULT HAVE THESE PROBLEMS MADE IT FOR YOU TO DO YOUR WORK, TAKE CARE OF THINGS AT HOME, OR GET ALONG WITH OTHER PEOPLE: SOMEWHAT DIFFICULT
3. TROUBLE FALLING OR STAYING ASLEEP: NOT AT ALL
2. FEELING DOWN, DEPRESSED OR HOPELESS: SEVERAL DAYS
9. THOUGHTS THAT YOU WOULD BE BETTER OFF DEAD, OR OF HURTING YOURSELF: NOT AT ALL
4. FEELING TIRED OR HAVING LITTLE ENERGY: NOT AT ALL
6. FEELING BAD ABOUT YOURSELF - OR THAT YOU ARE A FAILURE OR HAVE LET YOURSELF OR YOUR FAMILY DOWN: SEVERAL DAYS
8. MOVING OR SPEAKING SO SLOWLY THAT OTHER PEOPLE COULD HAVE NOTICED. OR THE OPPOSITE, BEING SO FIGETY OR RESTLESS THAT YOU HAVE BEEN MOVING AROUND A LOT MORE THAN USUAL: NOT AT ALL
1. LITTLE INTEREST OR PLEASURE IN DOING THINGS: NOT AT ALL
SUM OF ALL RESPONSES TO PHQ9 QUESTIONS 1 & 2: 1
SUM OF ALL RESPONSES TO PHQ QUESTIONS 1-9: 2
5. POOR APPETITE OR OVEREATING: NOT AT ALL

## 2023-05-15 ASSESSMENT — ANXIETY QUESTIONNAIRES
1. FEELING NERVOUS, ANXIOUS, OR ON EDGE: NEARLY EVERY DAY
4. TROUBLE RELAXING: NEARLY EVERY DAY
3. WORRYING TOO MUCH ABOUT DIFFERENT THINGS: SEVERAL DAYS
5. BEING SO RESTLESS THAT IT IS HARD TO SIT STILL: MORE THAN HALF THE DAYS
7. FEELING AFRAID AS IF SOMETHING AWFUL MIGHT HAPPEN: NOT AT ALL
IF YOU CHECKED OFF ANY PROBLEMS ON THIS QUESTIONNAIRE, HOW DIFFICULT HAVE THESE PROBLEMS MADE IT FOR YOU TO DO YOUR WORK, TAKE CARE OF THINGS AT HOME, OR GET ALONG WITH OTHER PEOPLE: SOMEWHAT DIFFICULT
GAD7 TOTAL SCORE: 12
2. NOT BEING ABLE TO STOP OR CONTROL WORRYING: SEVERAL DAYS
6. BECOMING EASILY ANNOYED OR IRRITABLE: MORE THAN HALF THE DAYS
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
## 2023-05-19 NOTE — PROGRESS NOTES
Subjective   Patient ID: Ha Goldberg is a 57 y.o. male who presents for Results and Follow-up. I last saw the patient on 2/20/2023.     HPI   Patient has raised bump that started in his eyelash then moved to his eyelid. Patient has had the marking checked by an eye doctor and he would like you to look at it also. Patient states that it was sore when it was on his eyelash line and now it doesn't hurt since it has moved.    Patient would like his annual physical to be completed in Aug ans would like labs to be put in prior to this appt. Patient would also like a Prostate exam and Psa     Patient declined weight today.    He did see Dr. Steel for his shoulder and was told that it needs to be replaced, but he should wait at this time. He has enough mobility to continue with his job.     Review of Systems  Except positives as noted in the CC & HPI      Constitutional: Denies fevers, chills, night sweats, fatigue, weight changes, change in appetite    Eyes: Denies blurry vision, double vision    ENT: Denies otalgia, trouble hearing, tinnitus, vertigo, nasal congestion, rhinorrhea, sore throat    Neck: Denies swelling, masses    Cardiovascular: Denies chest pain, palpitations, edema, orthopnea, syncope    Respiratory: Denies dyspnea, cough, wheezing, postural nocturnal dyspnea    Gastrointestinal: Denies abdominal pain, nausea, vomiting, diarrhea, constipation, melena, hematochezia    Genitourinary: Denies dysuria, hematuria, frequency, urgency    Musculoskeletal: Denies back pain, neck pain, arthralgias, myalgias    Integumentary: Denies skin lesions, rashes  Neurological: Denies dizziness, headaches, confusion, limb weakness, paresthesias, syncope, convulsions    Psychiatric: Denies depression, anxiety, homicidal ideations, suicidal ideations, sleep disturbances    Endocrine: Denies polyphagia, polydipsia, polyuria, weakness, hair thinning, heat intolerance, cold intolerance, weight changes    Heme/Lymph: Denies  "easy bruising, easy bleeding, swollen glands     Objective   /86 (BP Location: Right arm, Patient Position: Sitting)   Pulse 103   Temp 34.9 °C (94.8 °F) (Temporal)   Resp 16   Ht 1.778 m (5' 10\")   SpO2 97%   BMI 27.98 kg/m²     Physical Exam  128/86 on recheck of BP in the right arm.     General Appearance - well-developed, well-nourished, 57 y.o., White male in no acute distress.     Skin - warm, pink and dry without rash or concerning lesions. Mild erythema of the dorsum of his hands, bilaterally.     Mental Status - alert and oriented x 3. Normal mood and affect appropriate to mood. Patient seems more relaxed today.     Eyes - PERRLA. EOMI. Sclera is clear without icterus. Right eye reveals a very slight area of soft tissue thickening and erythema of the mid upper eye lid. No tenderness to palpation.     Neck - supple without lymphadenopathy. Carotid pulses are normal without bruits. Thyroid is normal in midline without nodules.     Chest - lungs are clear to auscultation without rales, rhonchi or wheezes.     Heart - regular, rate and rhythm without murmurs, rubs or gallops.    Abdomen - soft, flat, nontender, nondistended. No masses, hepatomegaly or splenomegaly is noted. No rebound, rigidity or guarding is noted. Bowel sounds are normoactive.    Extremities - no cyanosis, clubbing or edema. Pedal pulses are 2+ normal at the dorsalis pedis and posterior pulses bilaterally.     Neurological - cranial nerves II through XII are grossly intact. Motor strength 5/5 at all fours.     Lab Results   Component Value Date    HGBA1C 5.3 05/15/2023     Assessment/Plan   1. Benign essential hypertension  CBC and Auto Differential    Comprehensive metabolic panel      2. Hyperglycemia  Hemoglobin A1c      3. Mixed hyperlipidemia  Lipid panel      4. Gastroesophageal reflux disease without esophagitis        5. Anxiety about health        6. Primary osteoarthritis of right shoulder        7. Overweight with body " mass index (BMI) of 27 to 27.9 in adult        8. Screening PSA (prostate specific antigen)  Prostate Spec.Ag,Screen      Patient to continue current medications (with any exceptions as noted) and diet. Follow-up on 8/28/2023 for annual physical, otherwise as needed.      Patient is to return for fasting CBC, CMP, lipid panel, A1c, PSA labs at their convenience prior to his next appointment. Fasting is nothing to eat or drink except water or black coffee for 8-12 hours. Will call patient with results when available.     Patient is to follow up with Court Hurley as scheduled, later today.       Scribe Attestation  By signing my name below, I, Bessy oDmínguez, Scribe   attest that this documentation has been prepared under the direction and in the presence of Jan Darby MD.

## 2023-05-22 ENCOUNTER — OFFICE VISIT (OUTPATIENT)
Dept: PRIMARY CARE | Facility: CLINIC | Age: 58
End: 2023-05-22
Payer: COMMERCIAL

## 2023-05-22 ENCOUNTER — SOCIAL WORK (OUTPATIENT)
Dept: PRIMARY CARE | Facility: CLINIC | Age: 58
End: 2023-05-22
Payer: COMMERCIAL

## 2023-05-22 VITALS
DIASTOLIC BLOOD PRESSURE: 86 MMHG | HEART RATE: 103 BPM | SYSTOLIC BLOOD PRESSURE: 128 MMHG | OXYGEN SATURATION: 97 % | RESPIRATION RATE: 16 BRPM | BODY MASS INDEX: 27.98 KG/M2 | TEMPERATURE: 94.8 F | HEIGHT: 70 IN

## 2023-05-22 DIAGNOSIS — M19.011 PRIMARY OSTEOARTHRITIS OF RIGHT SHOULDER: ICD-10-CM

## 2023-05-22 DIAGNOSIS — F41.8 ANXIETY ABOUT HEALTH: ICD-10-CM

## 2023-05-22 DIAGNOSIS — E78.2 MIXED HYPERLIPIDEMIA: ICD-10-CM

## 2023-05-22 DIAGNOSIS — K21.9 GASTROESOPHAGEAL REFLUX DISEASE WITHOUT ESOPHAGITIS: ICD-10-CM

## 2023-05-22 DIAGNOSIS — E66.3 OVERWEIGHT WITH BODY MASS INDEX (BMI) OF 27 TO 27.9 IN ADULT: ICD-10-CM

## 2023-05-22 DIAGNOSIS — Z12.5 SCREENING PSA (PROSTATE SPECIFIC ANTIGEN): ICD-10-CM

## 2023-05-22 DIAGNOSIS — R73.9 HYPERGLYCEMIA: ICD-10-CM

## 2023-05-22 DIAGNOSIS — I10 BENIGN ESSENTIAL HYPERTENSION: Primary | ICD-10-CM

## 2023-05-22 PROBLEM — G56.03 BILATERAL CARPAL TUNNEL SYNDROME: Status: ACTIVE | Noted: 2021-05-22

## 2023-05-22 PROCEDURE — 3079F DIAST BP 80-89 MM HG: CPT | Performed by: FAMILY MEDICINE

## 2023-05-22 PROCEDURE — 99213 OFFICE O/P EST LOW 20 MIN: CPT | Performed by: FAMILY MEDICINE

## 2023-05-22 PROCEDURE — 3008F BODY MASS INDEX DOCD: CPT | Performed by: FAMILY MEDICINE

## 2023-05-22 PROCEDURE — 1036F TOBACCO NON-USER: CPT | Performed by: FAMILY MEDICINE

## 2023-05-22 PROCEDURE — 3074F SYST BP LT 130 MM HG: CPT | Performed by: FAMILY MEDICINE

## 2023-05-22 RX ORDER — CHOLECALCIFEROL (VITAMIN D3) 125 MCG
220 CAPSULE ORAL
COMMUNITY

## 2023-05-22 ASSESSMENT — PATIENT HEALTH QUESTIONNAIRE - PHQ9
2. FEELING DOWN, DEPRESSED OR HOPELESS: NOT AT ALL
3. TROUBLE FALLING OR STAYING ASLEEP: NOT AT ALL
6. FEELING BAD ABOUT YOURSELF - OR THAT YOU ARE A FAILURE OR HAVE LET YOURSELF OR YOUR FAMILY DOWN: MORE THAN HALF THE DAYS
7. TROUBLE CONCENTRATING ON THINGS, SUCH AS READING THE NEWSPAPER OR WATCHING TELEVISION: SEVERAL DAYS
5. POOR APPETITE OR OVEREATING: NOT AT ALL
9. THOUGHTS THAT YOU WOULD BE BETTER OFF DEAD, OR OF HURTING YOURSELF: NOT AT ALL
SUM OF ALL RESPONSES TO PHQ9 QUESTIONS 1 & 2: 0
8. MOVING OR SPEAKING SO SLOWLY THAT OTHER PEOPLE COULD HAVE NOTICED. OR THE OPPOSITE, BEING SO FIGETY OR RESTLESS THAT YOU HAVE BEEN MOVING AROUND A LOT MORE THAN USUAL: NOT AT ALL
1. LITTLE INTEREST OR PLEASURE IN DOING THINGS: NOT AT ALL
10. IF YOU CHECKED OFF ANY PROBLEMS, HOW DIFFICULT HAVE THESE PROBLEMS MADE IT FOR YOU TO DO YOUR WORK, TAKE CARE OF THINGS AT HOME, OR GET ALONG WITH OTHER PEOPLE: SOMEWHAT DIFFICULT
4. FEELING TIRED OR HAVING LITTLE ENERGY: SEVERAL DAYS
SUM OF ALL RESPONSES TO PHQ QUESTIONS 1-9: 4

## 2023-05-22 ASSESSMENT — ANXIETY QUESTIONNAIRES
3. WORRYING TOO MUCH ABOUT DIFFERENT THINGS: MORE THAN HALF THE DAYS
5. BEING SO RESTLESS THAT IT IS HARD TO SIT STILL: MORE THAN HALF THE DAYS
4. TROUBLE RELAXING: MORE THAN HALF THE DAYS
1. FEELING NERVOUS, ANXIOUS, OR ON EDGE: MORE THAN HALF THE DAYS
6. BECOMING EASILY ANNOYED OR IRRITABLE: SEVERAL DAYS
2. NOT BEING ABLE TO STOP OR CONTROL WORRYING: MORE THAN HALF THE DAYS
GAD7 TOTAL SCORE: 11
7. FEELING AFRAID AS IF SOMETHING AWFUL MIGHT HAPPEN: NOT AT ALL
IF YOU CHECKED OFF ANY PROBLEMS ON THIS QUESTIONNAIRE, HOW DIFFICULT HAVE THESE PROBLEMS MADE IT FOR YOU TO DO YOUR WORK, TAKE CARE OF THINGS AT HOME, OR GET ALONG WITH OTHER PEOPLE: SOMEWHAT DIFFICULT

## 2023-05-22 ASSESSMENT — LIFESTYLE VARIABLES
HOW MANY STANDARD DRINKS CONTAINING ALCOHOL DO YOU HAVE ON A TYPICAL DAY: 1 OR 2
AUDIT-C TOTAL SCORE: 2
SKIP TO QUESTIONS 9-10: 1
HOW OFTEN DO YOU HAVE SIX OR MORE DRINKS ON ONE OCCASION: NEVER
HOW OFTEN DO YOU HAVE A DRINK CONTAINING ALCOHOL: 2-4 TIMES A MONTH

## 2023-05-22 ASSESSMENT — PAIN SCALES - GENERAL: PAINLEVEL: 0-NO PAIN

## 2023-05-22 NOTE — PATIENT INSTRUCTIONS
Patient to continue current medications (with any exceptions as noted) and diet. Follow-up on 8/28/2023 for annual physical, otherwise as needed.      Patient is to return for fasting CBC, CMP, lipid panel, A1c, PSA labs at their convenience prior to his next appointment. Fasting is nothing to eat or drink except water or black coffee for 8-12 hours. Will call patient with results when available.     Patient is to follow up with Court Hurley as scheduled, later today.

## 2023-05-22 NOTE — PATIENT INSTRUCTIONS
Practice deep breathing video daily.  [email or web address]

## 2023-05-22 NOTE — PROGRESS NOTES
"Collaborative Care (CoCM)  Progress Note    Type of Interaction: In Office    Start Time: 9:07 AM    End Time: 9:45 AM    Appointment: Scheduled    Reason for Visit:   Chief Complaint   Patient presents with    Anxiety    Review of psych consult recommendations; address anxiety concerns ongoing    Interval History / Patient Symptoms:   Pt struggles with anxiety; not interested in starting SSRI at this time.    Patient Health Questionnaire-9 Score: 4 (5/22/2023  9:52 AM)  SARAN-7 Total Score: 11 (5/22/2023  9:53 AM)      Interventions Provided: Strengths Exploration, Communication Training, Develop Coping Strategies, and Facilitated deep breathing intervention in session.  Psychoeducation on communication styles and identified success with patient showing assertive communication (standing up for himself) with a neighbor was beneficial for him.   Psychoeducation on normal \"freeze or flight\" response to stress.      Progress Made: Minimum    Response to Intervention: Pt reports continued struggles with anxiety but does not that conflict with neighbor has decreased.  Pt processed his success of \"standing his ground\" with a neighbor and identified ways, in the past year, he showed passive, aggressive and assertive communication.  [facility] applauded pt for standing up for himself and seeing that he took action to work to reduce conflict and \"bullying\" bx from a neighbor.  Pt discussed deep breathing exercise being hard for him and didn't notice much reduction in anxiety after doing.      Plan: Address cognitive distortions, anxiety reduction techniques, and identify values (helpful/unhelpful thinking patterns.      Patient Instructions   Practice deep breathing video daily.  [email or web address]      Follow Up / Next Appointment: Next appointment: 06/12/23      "

## 2023-05-31 ENCOUNTER — DOCUMENTATION (OUTPATIENT)
Dept: PRIMARY CARE | Facility: CLINIC | Age: 58
End: 2023-05-31
Payer: COMMERCIAL

## 2023-05-31 DIAGNOSIS — F41.8 ANXIETY ABOUT HEALTH: Primary | ICD-10-CM

## 2023-05-31 PROCEDURE — 99493 SBSQ PSYC COLLAB CARE MGMT: CPT | Performed by: FAMILY MEDICINE

## 2023-05-31 PROCEDURE — 99494 1ST/SBSQ PSYC COLLAB CARE: CPT | Performed by: FAMILY MEDICINE

## 2023-06-12 ENCOUNTER — APPOINTMENT (OUTPATIENT)
Dept: PRIMARY CARE | Facility: CLINIC | Age: 58
End: 2023-06-12
Payer: COMMERCIAL

## 2023-06-13 ENCOUNTER — TELEPHONE (OUTPATIENT)
Dept: PRIMARY CARE | Facility: CLINIC | Age: 58
End: 2023-06-13
Payer: COMMERCIAL

## 2023-06-16 ENCOUNTER — DOCUMENTATION (OUTPATIENT)
Dept: PRIMARY CARE | Facility: CLINIC | Age: 58
End: 2023-06-16
Payer: COMMERCIAL

## 2023-06-16 NOTE — TELEPHONE ENCOUNTER
C called and spoke to patient on 6/16/23 and patient not interested in scheduling another appointment with [facility] and interested in trauma therapy.  Patient asked for help getting connected with referrals.  [facility] will assist.

## 2023-06-16 NOTE — PROGRESS NOTES
"Collaborative Care (CoCM)  Progress Note    Type of Interaction: Phone    Start Time: 1:45 PM    End Time: 2:01 PM        Appointment: Not Scheduled    Reason for Visit:   Chief Complaint   Patient presents with    Anxiety    Discussion about future plans for COCM services.      Interval History / Patient Symptoms:   Patient struggles with past trauma and anxiety.      Interventions Provided: Referrals and Treatment Planning  [facility] called patient to see if he was interested in rescheduling with COCM.  [facility] educated pt on expectations for the COCM program and how it differs from traditional trauma therapy.  Offered and agreed to assist pt with finding local trauma therapy specialists in which to refer him to further process his past traumas.       Progress Made: Minimum    Response to Intervention: Pt expressed that he didn't think COCM services is what he is looking for in order to come to terms with his past traumas.  Pt reports he wants to \"get to the root cause\" of his past traumas and the impact it has on his life.  Pt feels this will be more beneficial to him than skill building/coping skills for anxiety.   Pt asked for assistance getting linked with a trauma specialist in the area.        Plan: [facility] will assist with finding resources for referral, per his request.      Follow Up / Next Appointment: No follow up appointment at this time.       "

## 2023-06-19 ENCOUNTER — TELEPHONE (OUTPATIENT)
Dept: PRIMARY CARE | Facility: CLINIC | Age: 58
End: 2023-06-19

## 2023-06-21 NOTE — TELEPHONE ENCOUNTER
Patient responded that he would explore the referrals provided by [facility].  [facility] will follow up with patient in July, upon return from time out of office.

## 2023-06-23 ENCOUNTER — DOCUMENTATION (OUTPATIENT)
Dept: PRIMARY CARE | Facility: CLINIC | Age: 58
End: 2023-06-23
Payer: COMMERCIAL

## 2023-06-23 DIAGNOSIS — F41.8 ANXIETY ABOUT HEALTH: Primary | ICD-10-CM

## 2023-06-23 PROCEDURE — G2214 INIT/SUB PSYCH CARE M 1ST 30: HCPCS | Performed by: FAMILY MEDICINE

## 2023-07-15 ENCOUNTER — TELEPHONE (OUTPATIENT)
Dept: PRIMARY CARE | Facility: CLINIC | Age: 58
End: 2023-07-15
Payer: COMMERCIAL

## 2023-07-15 DIAGNOSIS — J01.90 ACUTE NON-RECURRENT SINUSITIS, UNSPECIFIED LOCATION: Primary | ICD-10-CM

## 2023-07-15 RX ORDER — METHYLPREDNISOLONE 4 MG/1
TABLET ORAL
Qty: 21 TABLET | Refills: 0 | Status: SHIPPED | OUTPATIENT
Start: 2023-07-15 | End: 2023-07-22

## 2023-07-15 RX ORDER — AZITHROMYCIN 250 MG/1
TABLET, FILM COATED ORAL
Qty: 6 TABLET | Refills: 0 | Status: SHIPPED | OUTPATIENT
Start: 2023-07-15 | End: 2023-07-20

## 2023-07-15 NOTE — TELEPHONE ENCOUNTER
Patient calls noting sinus congestion with ear pressure.  He does have a history of sinus infections and ear infection.  He was treated in the recent past for a sinus infection with a Z-Davi and Medrol Dosepak.  It did seem to work well for him and he would like these medications called in.  They were called into his local pharmacy.  He is to drink plenty of fluids and take Tylenol or Advil for pain or fever.  He will follow-up if symptoms persist or worsen or go to the ER with significant worsening of his symptoms.

## 2023-07-26 ENCOUNTER — TELEPHONE (OUTPATIENT)
Dept: PRIMARY CARE | Facility: CLINIC | Age: 58
End: 2023-07-26
Payer: COMMERCIAL

## 2023-08-09 ENCOUNTER — TELEMEDICINE (OUTPATIENT)
Dept: PRIMARY CARE | Facility: CLINIC | Age: 58
End: 2023-08-09
Payer: COMMERCIAL

## 2023-08-09 DIAGNOSIS — J01.00 ACUTE NON-RECURRENT MAXILLARY SINUSITIS: Primary | ICD-10-CM

## 2023-08-09 DIAGNOSIS — R09.81 NASAL CONGESTION: ICD-10-CM

## 2023-08-09 PROCEDURE — 99213 OFFICE O/P EST LOW 20 MIN: CPT | Performed by: NURSE PRACTITIONER

## 2023-08-09 RX ORDER — CEFDINIR 300 MG/1
300 CAPSULE ORAL 2 TIMES DAILY
Qty: 20 CAPSULE | Refills: 0 | Status: SHIPPED | OUTPATIENT
Start: 2023-08-09 | End: 2023-08-19

## 2023-08-09 ASSESSMENT — ENCOUNTER SYMPTOMS
HEADACHES: 1
DIAPHORESIS: 0
SINUS PRESSURE: 1
ARTHRALGIAS: 0
ACTIVITY CHANGE: 0
CHILLS: 0
FEVER: 0
HOARSE VOICE: 1
COUGH: 1
RHINORRHEA: 1
PALPITATIONS: 0
SHORTNESS OF BREATH: 0
SINUS PAIN: 1

## 2023-08-09 ASSESSMENT — PATIENT HEALTH QUESTIONNAIRE - PHQ9
SUM OF ALL RESPONSES TO PHQ9 QUESTIONS 1 AND 2: 0
2. FEELING DOWN, DEPRESSED OR HOPELESS: NOT AT ALL
1. LITTLE INTEREST OR PLEASURE IN DOING THINGS: NOT AT ALL

## 2023-08-09 NOTE — PROGRESS NOTES
Subjective   Patient ID: Ha Goldberg is a 57 y.o. male who presents for Sinusitis.    Sinusitis  This is a new problem. The current episode started yesterday. Associated symptoms include congestion, coughing, ear pain, headaches, a hoarse voice, sinus pressure and sneezing. Pertinent negatives include no chills, diaphoresis or shortness of breath. Past treatments include nasal decongestants, oral decongestants and acetaminophen. The treatment provided mild relief.    Patient reports that he feels like he has a sinus infection. Reports that this has happened to him before and he does not want to be sick because his elderly mother is coming into town on Saturday.     Review of Systems   Constitutional:  Negative for activity change, chills, diaphoresis and fever.   HENT:  Positive for congestion, ear pain, hoarse voice, postnasal drip, rhinorrhea, sinus pressure, sinus pain and sneezing.    Respiratory:  Positive for cough. Negative for shortness of breath.    Cardiovascular:  Negative for chest pain and palpitations.   Musculoskeletal:  Negative for arthralgias and gait problem.   Neurological:  Positive for headaches.     Objective   There were no vitals taken for this visit.    Physical Exam  No physical exam was performed as this was a virtual visit.     Assessment/Plan   Problem List Items Addressed This Visit    None  Visit Diagnoses       Acute non-recurrent maxillary sinusitis    -  Primary          Discussed diagnosis, treatment and anticipated course of illness with the patient who verbalized understanding. Discussed watchful waiting as his symptoms may be viral. Instructed to take OTC medications to see if there is improvement for 48 hours prior to starting antibiotic. Instructed to take medications as prescribed. Follow up with primary care provider in the event signs and symptoms worsen or fail to improve with treatment plan.

## 2023-08-21 ENCOUNTER — LAB (OUTPATIENT)
Dept: LAB | Facility: LAB | Age: 58
End: 2023-08-21
Payer: COMMERCIAL

## 2023-08-21 DIAGNOSIS — R73.9 HYPERGLYCEMIA: ICD-10-CM

## 2023-08-21 DIAGNOSIS — E78.2 MIXED HYPERLIPIDEMIA: ICD-10-CM

## 2023-08-21 DIAGNOSIS — I10 BENIGN ESSENTIAL HYPERTENSION: ICD-10-CM

## 2023-08-21 DIAGNOSIS — Z12.5 SCREENING PSA (PROSTATE SPECIFIC ANTIGEN): ICD-10-CM

## 2023-08-21 LAB
ALANINE AMINOTRANSFERASE (SGPT) (U/L) IN SER/PLAS: 55 U/L (ref 10–52)
ALBUMIN (G/DL) IN SER/PLAS: 4.8 G/DL (ref 3.4–5)
ALKALINE PHOSPHATASE (U/L) IN SER/PLAS: 91 U/L (ref 33–120)
ANION GAP IN SER/PLAS: 13 MMOL/L (ref 10–20)
ASPARTATE AMINOTRANSFERASE (SGOT) (U/L) IN SER/PLAS: 41 U/L (ref 9–39)
BASOPHILS (10*3/UL) IN BLOOD BY AUTOMATED COUNT: 0.04 X10E9/L (ref 0–0.1)
BASOPHILS/100 LEUKOCYTES IN BLOOD BY AUTOMATED COUNT: 0.4 % (ref 0–2)
BILIRUBIN TOTAL (MG/DL) IN SER/PLAS: 0.8 MG/DL (ref 0–1.2)
CALCIUM (MG/DL) IN SER/PLAS: 10.3 MG/DL (ref 8.6–10.3)
CARBON DIOXIDE, TOTAL (MMOL/L) IN SER/PLAS: 29 MMOL/L (ref 21–32)
CHLORIDE (MMOL/L) IN SER/PLAS: 104 MMOL/L (ref 98–107)
CHOLESTEROL (MG/DL) IN SER/PLAS: 144 MG/DL (ref 0–199)
CHOLESTEROL IN HDL (MG/DL) IN SER/PLAS: 60.2 MG/DL
CHOLESTEROL/HDL RATIO: 2.4
CREATININE (MG/DL) IN SER/PLAS: 0.88 MG/DL (ref 0.5–1.3)
EOSINOPHILS (10*3/UL) IN BLOOD BY AUTOMATED COUNT: 0.14 X10E9/L (ref 0–0.7)
EOSINOPHILS/100 LEUKOCYTES IN BLOOD BY AUTOMATED COUNT: 1.4 % (ref 0–6)
ERYTHROCYTE DISTRIBUTION WIDTH (RATIO) BY AUTOMATED COUNT: 13.3 % (ref 11.5–14.5)
ERYTHROCYTE MEAN CORPUSCULAR HEMOGLOBIN CONCENTRATION (G/DL) BY AUTOMATED: 32.9 G/DL (ref 32–36)
ERYTHROCYTE MEAN CORPUSCULAR VOLUME (FL) BY AUTOMATED COUNT: 86 FL (ref 80–100)
ERYTHROCYTES (10*6/UL) IN BLOOD BY AUTOMATED COUNT: 5.48 X10E12/L (ref 4.5–5.9)
ESTIMATED AVERAGE GLUCOSE FOR HBA1C: 108 MG/DL
GFR MALE: >90 ML/MIN/1.73M2
GLUCOSE (MG/DL) IN SER/PLAS: 109 MG/DL (ref 74–99)
HEMATOCRIT (%) IN BLOOD BY AUTOMATED COUNT: 47.4 % (ref 41–52)
HEMOGLOBIN (G/DL) IN BLOOD: 15.6 G/DL (ref 13.5–17.5)
HEMOGLOBIN A1C/HEMOGLOBIN TOTAL IN BLOOD: 5.4 %
IMMATURE GRANULOCYTES/100 LEUKOCYTES IN BLOOD BY AUTOMATED COUNT: 1 % (ref 0–0.9)
LDL: 69 MG/DL (ref 0–99)
LEUKOCYTES (10*3/UL) IN BLOOD BY AUTOMATED COUNT: 10 X10E9/L (ref 4.4–11.3)
LYMPHOCYTES (10*3/UL) IN BLOOD BY AUTOMATED COUNT: 1.56 X10E9/L (ref 1.2–4.8)
LYMPHOCYTES/100 LEUKOCYTES IN BLOOD BY AUTOMATED COUNT: 15.6 % (ref 13–44)
MONOCYTES (10*3/UL) IN BLOOD BY AUTOMATED COUNT: 0.67 X10E9/L (ref 0.1–1)
MONOCYTES/100 LEUKOCYTES IN BLOOD BY AUTOMATED COUNT: 6.7 % (ref 2–10)
NEUTROPHILS (10*3/UL) IN BLOOD BY AUTOMATED COUNT: 7.49 X10E9/L (ref 1.2–7.7)
NEUTROPHILS/100 LEUKOCYTES IN BLOOD BY AUTOMATED COUNT: 74.9 % (ref 40–80)
PLATELETS (10*3/UL) IN BLOOD AUTOMATED COUNT: 283 X10E9/L (ref 150–450)
POTASSIUM (MMOL/L) IN SER/PLAS: 4.9 MMOL/L (ref 3.5–5.3)
PROSTATE SPECIFIC ANTIGEN,SCREEN: 1.79 NG/ML (ref 0–4)
PROTEIN TOTAL: 6.9 G/DL (ref 6.4–8.2)
SODIUM (MMOL/L) IN SER/PLAS: 141 MMOL/L (ref 136–145)
TRIGLYCERIDE (MG/DL) IN SER/PLAS: 72 MG/DL (ref 0–149)
UREA NITROGEN (MG/DL) IN SER/PLAS: 16 MG/DL (ref 6–23)
VLDL: 14 MG/DL (ref 0–40)

## 2023-08-21 PROCEDURE — 80053 COMPREHEN METABOLIC PANEL: CPT

## 2023-08-21 PROCEDURE — 85025 COMPLETE CBC W/AUTO DIFF WBC: CPT

## 2023-08-21 PROCEDURE — 36415 COLL VENOUS BLD VENIPUNCTURE: CPT

## 2023-08-21 PROCEDURE — 83036 HEMOGLOBIN GLYCOSYLATED A1C: CPT

## 2023-08-21 PROCEDURE — 84153 ASSAY OF PSA TOTAL: CPT

## 2023-08-21 PROCEDURE — 80061 LIPID PANEL: CPT

## 2023-08-23 NOTE — PROGRESS NOTES
Subjective   Patient ID: Ha Goldberg is a 57 y.o. male who presents for Annual Exam, Hypertension, and Hyperlipidemia. I last saw the patient on 5/22/2023.     HPI   Patient would like a prostate exam.     He notes that 3 weeks ago, today, he started experiencing sinus issues. He was placed on a different ATB and adds that it did not work very well. He states that his sinus infections occur more often now and he was unable to smell anything with this last episode.     Patient has labs to be discussed. He currently takes Rosuvastatin 5 mg, every other day. He wonders if his HDL should be higher. He adds that before his 12-hour fast, he did drink alcohol.     Review of Systems  Except positives as noted in the CC & HPI      Constitutional: Denies fevers, chills, night sweats, fatigue, weight changes, change in appetite    Eyes: Denies blurry vision, double vision    ENT: Denies otalgia, trouble hearing, tinnitus, vertigo, nasal congestion, rhinorrhea, sore throat    Neck: Denies swelling, masses    Cardiovascular: Denies chest pain, palpitations, edema, orthopnea, syncope    Respiratory: Denies dyspnea, cough, wheezing, postural nocturnal dyspnea    Gastrointestinal: Denies abdominal pain, nausea, vomiting, diarrhea, constipation, melena, hematochezia    Genitourinary: Denies dysuria, hematuria, frequency, urgency    Musculoskeletal: Denies back pain, neck pain, arthralgias, myalgias    Integumentary: Denies skin lesions, rashes, masses    Neurological: Denies dizziness, headaches, confusion, limb weakness, paresthesias, syncope, convulsions    Psychiatric: Denies depression, anxiety, homicidal ideations, suicidal ideations, sleep disturbances    Endocrine: Denies polyphagia, polydipsia, polyuria, weakness, hair thinning, heat intolerance, cold intolerance, weight changes    Heme/Lymph: Denies easy bruising, easy bleeding, swollen glands    Objective   /88 (BP Location: Right arm, Patient Position: Sitting)   " Pulse 84   Temp 35.6 °C (96 °F)   Resp 16   Ht 1.778 m (5' 10\")   SpO2 99%   BMI 27.98 kg/m²     Physical Exam  136/88 on recheck of BP in the right arm.     General Appearance - well-developed, well-nourished, 57 y.o., White male in no acute distress.     Skin - warm, pink and mildly diaphoretic without rash or concerning lesions.     Mental Status - alert and oriented x 3. Normal mood and affect appropriate to mood.     Ears - TMs shiny and move well with insufflation. Ear canals are clear bilaterally.     Nose - nasal passages are clear bilaterally without bleeding or nasal discharge.    Mouth - pharynx is pink with whitish spots in the right posterior pharynx and pharyngeal arch. Dentition is normal appearing. Tongue and uvula move in the midline. Throat culture was taken.     Neck - supple without lymphadenopathy. Carotid pulses are normal without bruits. Thyroid is normal in midline without nodules.     Chest - lungs are clear to auscultation without rales, rhonchi or wheezes.     Heart - regular, rate and rhythm without murmurs, rubs or gallops.    Abdomen - soft, flat, nontender, nondistended. No masses, hepatomegaly or splenomegaly is noted. No rebound, rigidity or guarding is noted. Bowel sounds are normoactive.     Rectal - normal anal opening. Normal sphincter tone. COY revealed mildly enlarged 1+ prostate without nodularity. Stool in the rectal vault was brown and guaiac negative. No masses appreciated.     Extremities - no cyanosis, clubbing or edema. Pedal pulses are 2+ normal at the dorsalis pedis and posterior pulses bilaterally.     Neurological - cranial nerves II through XII are grossly intact. Motor strength 5/5 at all fours.     Lab Results   Component Value Date    ALBUMIN 4.8 08/21/2023    ALT 55 (H) 08/21/2023    AST 41 (H) 08/21/2023    BUN 16 08/21/2023    CALCIUM 10.3 08/21/2023     08/21/2023    CHOL 144 08/21/2023    CO2 29 08/21/2023    CREATININE 0.88 08/21/2023    " GFRMALE >90 08/21/2023    GLUCOSE 109 (H) 08/21/2023    HDL 60.2 08/21/2023    HCT 47.4 08/21/2023    HGB 15.6 08/21/2023    HGBA1C 5.4 08/21/2023    K 4.9 08/21/2023    LDLF 69 08/21/2023     08/21/2023     08/21/2023    PSASCREEN 1.79 08/21/2023    TRIG 72 08/21/2023    WBC 10.0 08/21/2023     Assessment/Plan   1. Healthcare maintenance        2. Pharyngitis, unspecified etiology  Tissue/Wound Culture/Smear      3. Benign essential hypertension  CBC and Auto Differential    Comprehensive Metabolic Panel      4. Mixed hyperlipidemia  Lipid Panel      5. Hyperglycemia  Comprehensive Metabolic Panel    Hemoglobin A1C      6. Asthma, unspecified asthma severity, unspecified whether complicated, unspecified whether persistent  albuterol 90 mcg/actuation inhaler      7. Gastroesophageal reflux disease without esophagitis        8. Screening PSA (prostate specific antigen)  Prostate Specific Antigen, Screen      Patient to continue current medications (with any exceptions as noted) and diet. Follow-up in 12 month(s) otherwise as needed.     Patient is to return for fasting CBC, CMP, lipid panel, A1c, PSA labs at their convenience prior to his next appointment. Fasting is nothing to eat or drink except water or black coffee for 8-12 hours. Will call patient with results when available.     Patient was given refill(s) on:   Albuterol Sulfate HFA 90 mcg/act, INHALE 2 PUFFS EVERY 4-6 HOURS AS NEEDED     Rx(s) sent to pharmacy.     Flu vaccine given in the office today.    Recommend patient monitor his BP at home with an [employer] blood pressure monitor.      Suggest that patient inform treating physician that he generally does better on a Z-Davi and Medrol Dosepak for his sinus infections.       Scribe Attestation  By signing my name below, I, Beth Be   attest that this documentation has been prepared under the direction and in the presence of Jan Darby MD.

## 2023-08-28 ENCOUNTER — OFFICE VISIT (OUTPATIENT)
Dept: PRIMARY CARE | Facility: CLINIC | Age: 58
End: 2023-08-28
Payer: COMMERCIAL

## 2023-08-28 VITALS
TEMPERATURE: 96 F | SYSTOLIC BLOOD PRESSURE: 136 MMHG | RESPIRATION RATE: 16 BRPM | OXYGEN SATURATION: 99 % | HEART RATE: 84 BPM | BODY MASS INDEX: 27.98 KG/M2 | HEIGHT: 70 IN | DIASTOLIC BLOOD PRESSURE: 88 MMHG

## 2023-08-28 DIAGNOSIS — K21.9 GASTROESOPHAGEAL REFLUX DISEASE WITHOUT ESOPHAGITIS: ICD-10-CM

## 2023-08-28 DIAGNOSIS — I10 BENIGN ESSENTIAL HYPERTENSION: ICD-10-CM

## 2023-08-28 DIAGNOSIS — Z12.5 SCREENING PSA (PROSTATE SPECIFIC ANTIGEN): ICD-10-CM

## 2023-08-28 DIAGNOSIS — J45.909 ASTHMA, UNSPECIFIED ASTHMA SEVERITY, UNSPECIFIED WHETHER COMPLICATED, UNSPECIFIED WHETHER PERSISTENT (HHS-HCC): ICD-10-CM

## 2023-08-28 DIAGNOSIS — E78.2 MIXED HYPERLIPIDEMIA: ICD-10-CM

## 2023-08-28 DIAGNOSIS — J02.9 PHARYNGITIS, UNSPECIFIED ETIOLOGY: ICD-10-CM

## 2023-08-28 DIAGNOSIS — Z00.00 HEALTHCARE MAINTENANCE: Primary | ICD-10-CM

## 2023-08-28 DIAGNOSIS — R73.9 HYPERGLYCEMIA: ICD-10-CM

## 2023-08-28 PROBLEM — R09.81 SINUS CONGESTION: Status: RESOLVED | Noted: 2023-03-19 | Resolved: 2023-08-28

## 2023-08-28 PROCEDURE — 3075F SYST BP GE 130 - 139MM HG: CPT | Performed by: FAMILY MEDICINE

## 2023-08-28 PROCEDURE — 1036F TOBACCO NON-USER: CPT | Performed by: FAMILY MEDICINE

## 2023-08-28 PROCEDURE — 87070 CULTURE OTHR SPECIMN AEROBIC: CPT

## 2023-08-28 PROCEDURE — 87075 CULTR BACTERIA EXCEPT BLOOD: CPT

## 2023-08-28 PROCEDURE — 3079F DIAST BP 80-89 MM HG: CPT | Performed by: FAMILY MEDICINE

## 2023-08-28 PROCEDURE — 3008F BODY MASS INDEX DOCD: CPT | Performed by: FAMILY MEDICINE

## 2023-08-28 PROCEDURE — 99396 PREV VISIT EST AGE 40-64: CPT | Performed by: FAMILY MEDICINE

## 2023-08-28 PROCEDURE — 87205 SMEAR GRAM STAIN: CPT

## 2023-08-28 RX ORDER — ALBUTEROL SULFATE 90 UG/1
AEROSOL, METERED RESPIRATORY (INHALATION)
Qty: 25.5 G | Refills: 3 | Status: SHIPPED | OUTPATIENT
Start: 2023-08-28

## 2023-08-28 ASSESSMENT — PATIENT HEALTH QUESTIONNAIRE - PHQ9
2. FEELING DOWN, DEPRESSED OR HOPELESS: NOT AT ALL
SUM OF ALL RESPONSES TO PHQ9 QUESTIONS 1 & 2: 0
1. LITTLE INTEREST OR PLEASURE IN DOING THINGS: NOT AT ALL

## 2023-08-28 NOTE — PATIENT INSTRUCTIONS
Patient to continue current medications (with any exceptions as noted) and diet. Follow-up in 12 month(s) otherwise as needed.      Patient was given refill(s) on:   Albuterol Sulfate HFA 90 mcg/act, INHALE 2 PUFFS EVERY 4-6 HOURS AS NEEDED     Rx(s) sent to pharmacy.     Flu vaccine given in the office today.    Recommend patient monitor his BP at home with an [employer] blood pressure monitor.      Suggest that patient inform treating physician that he generally does better on a Z-Davi and Medrol Dosepak for his sinus issues.

## 2023-08-30 LAB
GRAM STN SPEC: NORMAL
TISSUE/WOUND CULTURE/SMEAR: NORMAL

## 2023-09-04 DIAGNOSIS — B37.0 ORAL THRUSH: Primary | ICD-10-CM

## 2023-09-04 RX ORDER — CLOTRIMAZOLE 10 MG/1
10 LOZENGE ORAL; TOPICAL
Qty: 70 TABLET | Refills: 0 | Status: SHIPPED | OUTPATIENT
Start: 2023-09-04 | End: 2023-09-19 | Stop reason: SDUPTHER

## 2023-09-04 NOTE — RESULT ENCOUNTER NOTE
Please call the patient regarding his abnormal result.  Normal oral nicolas and yeast.  A prescription was called into patient's pharmacy for Mycelex Troches, 5 times per day for 2 weeks.

## 2023-09-06 ENCOUNTER — TELEPHONE (OUTPATIENT)
Dept: PRIMARY CARE | Facility: CLINIC | Age: 58
End: 2023-09-06
Payer: COMMERCIAL

## 2023-09-06 NOTE — TELEPHONE ENCOUNTER
Pt states that BB put a note in his chart saying that Z-Davi and prednisone are the preferred medications for him when an antibiotic is needed. He is upset that an NP prescribed a different antibiotic for him and now he has thrush. He is upset that he was advised by 'somebody' that in the future he should question other antibiotics when prescribed. A blue note was added to chart with the antibiotic preference. I also advised pt that Z-Pac may not always be the best option depending on the infection and the best option would be to ask a prescriber questions about new medications.

## 2023-09-07 DIAGNOSIS — K64.9 HEMORRHOIDS, UNSPECIFIED HEMORRHOID TYPE: Primary | ICD-10-CM

## 2023-09-07 RX ORDER — HYDROCORTISONE 25 MG/G
CREAM TOPICAL
Qty: 28 G | Refills: 1 | Status: SHIPPED | OUTPATIENT
Start: 2023-09-07

## 2023-09-07 NOTE — TELEPHONE ENCOUNTER
Rx Refill Request Telephone Encounter    Name:  Ha Goldberg  :  [id number]  Medication Name:  hydrocortisone 2.5%    Specific Pharmacy location:  [facility]  Date of last appointment:  23  Date of next appointment:  24  Best number to reach patient:  [phone]      Patient also had a question about his test results he jus had a woun/tissue swab and he had abnormal results and he wanted to talk to an ma or his pcp in more detail about what would cause something like this. Please advise

## 2023-09-19 DIAGNOSIS — B37.0 ORAL THRUSH: Primary | ICD-10-CM

## 2023-09-19 RX ORDER — CLOTRIMAZOLE 10 MG/1
10 LOZENGE ORAL; TOPICAL
Qty: 70 TABLET | Refills: 0 | Status: SHIPPED | OUTPATIENT
Start: 2023-09-19 | End: 2023-10-03

## 2023-09-19 NOTE — TELEPHONE ENCOUNTER
Patient called in stating he has a yeast infection in his throat. He was given a medication that you put in your mouth and dissolve. He stated this medication helped start to break it up but he is out of this medication but he is not out of this medication. He is wondering what he should do? Can he get a second round of this? Should he follow up with Dr. Darby? Patient stated he never received any instructions on what to do and stated he has some questions. He is requesting a call back from someone with a medical background to answer questions for him  Please Advise

## 2023-09-19 NOTE — TELEPHONE ENCOUNTER
Patient was given Mycelex Troches on 9/4/2023. He ended this yesterday. Can he have more? Rx pended.     Upon calling pt back we also need to make sure the patient is rinsing his mouth out after using his advair inhaler twice a day. If he is not doing that it be the cause of his oral thrush.

## 2023-11-29 DIAGNOSIS — J45.909 UNCOMPLICATED ASTHMA, UNSPECIFIED ASTHMA SEVERITY, UNSPECIFIED WHETHER PERSISTENT (HHS-HCC): ICD-10-CM

## 2023-11-29 RX ORDER — FLUTICASONE PROPIONATE AND SALMETEROL 500; 50 UG/1; UG/1
1 POWDER RESPIRATORY (INHALATION)
Qty: 180 EACH | Refills: 3 | Status: SHIPPED | OUTPATIENT
Start: 2023-11-29

## 2023-11-29 NOTE — TELEPHONE ENCOUNTER
Rx Refill Request Telephone Encounter    Name:  Ha Goldberg  :  [id number]  Medication Name:  fluticasone propion-salmeteroL (Advair Diskus) 500-50 mcg/dose diskus inhaler   valsartan (Diovan) 160 mg   Specific Pharmacy location:  [facility]  Date of last appointment:    Date of next appointment:  2024  Best number to reach patient:  [phone]           Patient requesting a 90 day supply of both

## 2023-11-30 DIAGNOSIS — I10 BENIGN ESSENTIAL HYPERTENSION: ICD-10-CM

## 2023-11-30 DIAGNOSIS — M19.011 PRIMARY OSTEOARTHRITIS OF RIGHT SHOULDER: ICD-10-CM

## 2023-11-30 RX ORDER — VALSARTAN 160 MG/1
160 TABLET ORAL
Qty: 90 TABLET | Refills: 3 | Status: SHIPPED | OUTPATIENT
Start: 2023-11-30

## 2023-11-30 NOTE — TELEPHONE ENCOUNTER
Patient called in stating he got a notice from [facility] that only the inhaler was called in. Patient also expressed that he was frustrated with the phone call he had yesterday with the girl that took his original message. Patient states she got an attitude with him for no reason at all and does not think it was professional. Patient is requesting the valsartan 160 mg to be [facility] to [facility] as a 90 day supply with 3 refills per insurance coverage.     Last fill 2/20/23 per AE  Lov: 8/28  Future appointment 8/26/24    Please advise.

## 2024-03-01 DIAGNOSIS — E78.2 MIXED HYPERLIPIDEMIA: ICD-10-CM

## 2024-03-01 DIAGNOSIS — K21.9 GASTROESOPHAGEAL REFLUX DISEASE WITHOUT ESOPHAGITIS: ICD-10-CM

## 2024-03-01 NOTE — TELEPHONE ENCOUNTER
Rx Refill Request Telephone Encounter    Name:  Ha Goldberg  :  [id number]  Medication Name:  PLEASE SEND THE PRESCRIPTIONS AS A 90 DAY SUPPLY WITH 3 REFILLS FOR BOTH MEDS TO [facility]:    pantoprazole (ProtoNix) 40 mg EC tablet   rosuvastatin (Crestor) 5 mg tablet     Specific Pharmacy location:  [facility]   Date of last appointment:  23  Date of next appointment:  24  Best number to reach patient:  [phone]

## 2024-03-02 RX ORDER — PANTOPRAZOLE SODIUM 40 MG/1
40 TABLET, DELAYED RELEASE ORAL 2 TIMES DAILY
Qty: 180 TABLET | Refills: 3 | Status: SHIPPED | OUTPATIENT
Start: 2024-03-02

## 2024-03-02 RX ORDER — ROSUVASTATIN CALCIUM 5 MG/1
5 TABLET, COATED ORAL
Qty: 90 TABLET | Refills: 3 | Status: SHIPPED | OUTPATIENT
Start: 2024-03-02

## 2024-07-22 DIAGNOSIS — K64.9 HEMORRHOIDS, UNSPECIFIED HEMORRHOID TYPE: Primary | ICD-10-CM

## 2024-07-22 RX ORDER — HYDROCORTISONE 25 MG/G
CREAM TOPICAL
Qty: 28 G | Refills: 1 | Status: SHIPPED | OUTPATIENT
Start: 2024-07-22

## 2024-07-22 NOTE — TELEPHONE ENCOUNTER
Rx Refill Request Telephone Encounter    Name:  Ha Goldberg  :  [id number]  Medication Name:  hydrocortisone 2.5 % cream     Specific Pharmacy location:  [facility]  Date of last appointment:  23  Date of next appointment:  24  Best number to reach patient:  [phone]

## 2024-08-19 ENCOUNTER — LAB (OUTPATIENT)
Dept: LAB | Facility: LAB | Age: 59
End: 2024-08-19
Payer: COMMERCIAL

## 2024-08-19 DIAGNOSIS — I10 BENIGN ESSENTIAL HYPERTENSION: ICD-10-CM

## 2024-08-19 DIAGNOSIS — E78.2 MIXED HYPERLIPIDEMIA: ICD-10-CM

## 2024-08-19 DIAGNOSIS — Z12.5 SCREENING PSA (PROSTATE SPECIFIC ANTIGEN): ICD-10-CM

## 2024-08-19 DIAGNOSIS — R73.9 HYPERGLYCEMIA: ICD-10-CM

## 2024-08-19 LAB
ALBUMIN SERPL BCP-MCNC: 4.7 G/DL (ref 3.4–5)
ALP SERPL-CCNC: 100 U/L (ref 33–136)
ALT SERPL W P-5'-P-CCNC: 42 U/L (ref 10–52)
ANION GAP SERPL CALC-SCNC: 13 MMOL/L (ref 10–20)
AST SERPL W P-5'-P-CCNC: 35 U/L (ref 9–39)
BASOPHILS # BLD AUTO: 0.03 X10*3/UL (ref 0–0.1)
BASOPHILS NFR BLD AUTO: 0.5 %
BILIRUB SERPL-MCNC: 0.8 MG/DL (ref 0–1.2)
BUN SERPL-MCNC: 17 MG/DL (ref 6–23)
CALCIUM SERPL-MCNC: 10.1 MG/DL (ref 8.6–10.3)
CHLORIDE SERPL-SCNC: 103 MMOL/L (ref 98–107)
CHOLEST SERPL-MCNC: 157 MG/DL (ref 0–199)
CHOLESTEROL/HDL RATIO: 2.9
CO2 SERPL-SCNC: 28 MMOL/L (ref 21–32)
CREAT SERPL-MCNC: 0.78 MG/DL (ref 0.5–1.3)
EGFRCR SERPLBLD CKD-EPI 2021: >90 ML/MIN/1.73M*2
EOSINOPHIL # BLD AUTO: 0.21 X10*3/UL (ref 0–0.7)
EOSINOPHIL NFR BLD AUTO: 3.2 %
ERYTHROCYTE [DISTWIDTH] IN BLOOD BY AUTOMATED COUNT: 13.1 % (ref 11.5–14.5)
EST. AVERAGE GLUCOSE BLD GHB EST-MCNC: 105 MG/DL
GLUCOSE SERPL-MCNC: 99 MG/DL (ref 74–99)
HBA1C MFR BLD: 5.3 %
HCT VFR BLD AUTO: 47.5 % (ref 41–52)
HDLC SERPL-MCNC: 54.3 MG/DL
HGB BLD-MCNC: 15.8 G/DL (ref 13.5–17.5)
IMM GRANULOCYTES # BLD AUTO: 0.02 X10*3/UL (ref 0–0.7)
IMM GRANULOCYTES NFR BLD AUTO: 0.3 % (ref 0–0.9)
LDLC SERPL CALC-MCNC: 86 MG/DL
LYMPHOCYTES # BLD AUTO: 1.25 X10*3/UL (ref 1.2–4.8)
LYMPHOCYTES NFR BLD AUTO: 19.2 %
MCH RBC QN AUTO: 28.4 PG (ref 26–34)
MCHC RBC AUTO-ENTMCNC: 33.3 G/DL (ref 32–36)
MCV RBC AUTO: 85 FL (ref 80–100)
MONOCYTES # BLD AUTO: 0.46 X10*3/UL (ref 0.1–1)
MONOCYTES NFR BLD AUTO: 7.1 %
NEUTROPHILS # BLD AUTO: 4.55 X10*3/UL (ref 1.2–7.7)
NEUTROPHILS NFR BLD AUTO: 69.7 %
NON HDL CHOLESTEROL: 103 MG/DL (ref 0–149)
NRBC BLD-RTO: 0 /100 WBCS (ref 0–0)
PLATELET # BLD AUTO: 266 X10*3/UL (ref 150–450)
POTASSIUM SERPL-SCNC: 4.5 MMOL/L (ref 3.5–5.3)
PROT SERPL-MCNC: 7 G/DL (ref 6.4–8.2)
PSA SERPL-MCNC: 2.11 NG/ML
RBC # BLD AUTO: 5.57 X10*6/UL (ref 4.5–5.9)
SODIUM SERPL-SCNC: 139 MMOL/L (ref 136–145)
TRIGL SERPL-MCNC: 83 MG/DL (ref 0–149)
VLDL: 17 MG/DL (ref 0–40)
WBC # BLD AUTO: 6.5 X10*3/UL (ref 4.4–11.3)

## 2024-08-19 PROCEDURE — 80061 LIPID PANEL: CPT

## 2024-08-19 PROCEDURE — 36415 COLL VENOUS BLD VENIPUNCTURE: CPT

## 2024-08-19 PROCEDURE — 85025 COMPLETE CBC W/AUTO DIFF WBC: CPT

## 2024-08-19 PROCEDURE — 80053 COMPREHEN METABOLIC PANEL: CPT

## 2024-08-19 PROCEDURE — 83036 HEMOGLOBIN GLYCOSYLATED A1C: CPT

## 2024-08-19 PROCEDURE — 84153 ASSAY OF PSA TOTAL: CPT

## 2024-08-19 ASSESSMENT — PROMIS GLOBAL HEALTH SCALE
RATE_PHYSICAL_HEALTH: VERY GOOD
RATE_SOCIAL_SATISFACTION: VERY GOOD
RATE_GENERAL_HEALTH: VERY GOOD
CARRYOUT_PHYSICAL_ACTIVITIES: COMPLETELY
RATE_AVERAGE_PAIN: 0
RATE_QUALITY_OF_LIFE: VERY GOOD
CARRYOUT_SOCIAL_ACTIVITIES: VERY GOOD
EMOTIONAL_PROBLEMS: RARELY
RATE_MENTAL_HEALTH: VERY GOOD

## 2024-08-26 ENCOUNTER — APPOINTMENT (OUTPATIENT)
Dept: PRIMARY CARE | Facility: CLINIC | Age: 59
End: 2024-08-26
Payer: COMMERCIAL

## 2024-08-26 VITALS
DIASTOLIC BLOOD PRESSURE: 82 MMHG | TEMPERATURE: 97.4 F | OXYGEN SATURATION: 99 % | SYSTOLIC BLOOD PRESSURE: 130 MMHG | BODY MASS INDEX: 27.98 KG/M2 | HEIGHT: 70 IN | HEART RATE: 97 BPM

## 2024-08-26 DIAGNOSIS — E78.2 MIXED HYPERLIPIDEMIA: ICD-10-CM

## 2024-08-26 DIAGNOSIS — K21.9 GASTROESOPHAGEAL REFLUX DISEASE WITHOUT ESOPHAGITIS: ICD-10-CM

## 2024-08-26 DIAGNOSIS — K64.9 HEMORRHOIDS, UNSPECIFIED HEMORRHOID TYPE: ICD-10-CM

## 2024-08-26 DIAGNOSIS — J45.909 UNCOMPLICATED ASTHMA, UNSPECIFIED ASTHMA SEVERITY, UNSPECIFIED WHETHER PERSISTENT (HHS-HCC): ICD-10-CM

## 2024-08-26 DIAGNOSIS — J45.909 ASTHMA, UNSPECIFIED ASTHMA SEVERITY, UNSPECIFIED WHETHER COMPLICATED, UNSPECIFIED WHETHER PERSISTENT (HHS-HCC): ICD-10-CM

## 2024-08-26 DIAGNOSIS — I10 BENIGN ESSENTIAL HYPERTENSION: ICD-10-CM

## 2024-08-26 DIAGNOSIS — R73.9 HYPERGLYCEMIA: ICD-10-CM

## 2024-08-26 DIAGNOSIS — R94.31 ABNORMAL EKG: ICD-10-CM

## 2024-08-26 DIAGNOSIS — Z00.00 ANNUAL PHYSICAL EXAM: Primary | ICD-10-CM

## 2024-08-26 LAB — POC FECAL OCCULT BLOOD: NEGATIVE

## 2024-08-26 PROCEDURE — 1036F TOBACCO NON-USER: CPT | Performed by: FAMILY MEDICINE

## 2024-08-26 PROCEDURE — 82270 OCCULT BLOOD FECES: CPT | Performed by: FAMILY MEDICINE

## 2024-08-26 PROCEDURE — 3075F SYST BP GE 130 - 139MM HG: CPT | Performed by: FAMILY MEDICINE

## 2024-08-26 PROCEDURE — 3079F DIAST BP 80-89 MM HG: CPT | Performed by: FAMILY MEDICINE

## 2024-08-26 PROCEDURE — 93000 ELECTROCARDIOGRAM COMPLETE: CPT | Performed by: FAMILY MEDICINE

## 2024-08-26 PROCEDURE — 99396 PREV VISIT EST AGE 40-64: CPT | Performed by: FAMILY MEDICINE

## 2024-08-26 RX ORDER — VALSARTAN 160 MG/1
160 TABLET ORAL
Qty: 90 TABLET | Refills: 3 | Status: SHIPPED | OUTPATIENT
Start: 2024-08-26

## 2024-08-26 RX ORDER — PANTOPRAZOLE SODIUM 40 MG/1
40 TABLET, DELAYED RELEASE ORAL 2 TIMES DAILY
Qty: 180 TABLET | Refills: 3 | Status: SHIPPED | OUTPATIENT
Start: 2024-08-26

## 2024-08-26 RX ORDER — ROSUVASTATIN CALCIUM 5 MG/1
5 TABLET, COATED ORAL
Qty: 90 TABLET | Refills: 3 | Status: SHIPPED | OUTPATIENT
Start: 2024-08-26

## 2024-08-26 RX ORDER — HYDROCORTISONE 25 MG/G
CREAM TOPICAL
Qty: 28 G | Refills: 1 | Status: SHIPPED | OUTPATIENT
Start: 2024-08-26

## 2024-08-26 RX ORDER — ALBUTEROL SULFATE 90 UG/1
INHALANT RESPIRATORY (INHALATION)
Qty: 25.5 G | Refills: 3 | Status: SHIPPED | OUTPATIENT
Start: 2024-08-26

## 2024-08-26 RX ORDER — FLUTICASONE PROPIONATE AND SALMETEROL 500; 50 UG/1; UG/1
1 POWDER RESPIRATORY (INHALATION)
Qty: 180 EACH | Refills: 3 | Status: SHIPPED | OUTPATIENT
Start: 2024-08-26

## 2024-08-26 ASSESSMENT — PATIENT HEALTH QUESTIONNAIRE - PHQ9
2. FEELING DOWN, DEPRESSED OR HOPELESS: NOT AT ALL
1. LITTLE INTEREST OR PLEASURE IN DOING THINGS: NOT AT ALL
SUM OF ALL RESPONSES TO PHQ9 QUESTIONS 1 AND 2: 0

## 2024-08-26 NOTE — PROGRESS NOTES
Subjective   Patient ID: Ha Goldberg is a 58 y.o. male who presents for Annual Exam. I last saw the patient on 8/28/2023    HPI     Patient is here for a Physical exam     Needs advair written as fluticasone/metenol  [facility]    Patient mentions not having issue until the Advair dosage has been increased. He says having oral thrush with Advair.    Patient mentions having numbness in his left leg for which he is doing home stretches and exercises which does seem to help.     Patient mentions having trouble with his shoulders for which he has been seeing an orthopedic surgeon.    Past medical, surgical, and family history reviewed.  Reviewed and documented all medications   Pt eating well, exercising as tolerated and taking medications as directed      Review of Systems  Except positives as noted in the CC & HPI      Constitutional: Denies fevers, chills, night sweats, fatigue, weight changes, change in appetite    Eyes: Denies blurry vision, double vision    ENT: Denies otalgia, trouble hearing, tinnitus, vertigo, nasal congestion, rhinorrhea, sore throat    Neck: Denies swelling, masses    Cardiovascular: Denies chest pain, palpitations, edema, orthopnea, syncope    Respiratory: Denies dyspnea, cough, wheezing, postural nocturnal dyspnea    Gastrointestinal: Denies abdominal pain, nausea, vomiting, diarrhea, constipation, melena, hematochezia    Genitourinary: Denies dysuria, hematuria, frequency, urgency    Musculoskeletal: Denies back pain, neck pain, arthralgias, myalgias    Integumentary: Denies skin lesions, rashes, masses    Neurological: Denies dizziness, headaches, confusion, limb weakness, paresthesias, syncope, convulsions    Psychiatric: Denies depression, anxiety, homicidal ideations, suicidal ideations, sleep disturbances    Endocrine: Denies polyphagia, polydipsia, polyuria, weakness, hair thinning, heat intolerance, cold  "intolerance, weight changes    Heme/Lymph: Denies easy bruising, easy bleeding, swollen glands    Objective   /82 (BP Location: Right arm, Patient Position: Sitting, BP Cuff Size: Adult long)   Pulse 97   Temp 36.3 °C (97.4 °F)   Ht 1.778 m (5' 10\")   SpO2 99%   BMI 27.98 kg/m²     Physical Exam  130/82 on recheck of BP in the right arm.     General Appearance - well-developed, well-nourished, 58 y.o., White male in no acute distress.     Skin - warm, pink and mildly diaphoretic without rash or concerning lesions.     Mental Status - alert and oriented x 3. Normal mood and affect appropriate to mood.     Ears - TMs shiny and move well with insufflation. Ear canals are clear bilaterally.     Mouth - pharynx is pink with whitish discoloration at the buccal mucosa near the 3rd molars and angles of his mouth. Dentition is normal appearing. Tongue and uvula move in the midline.     Neck - supple without lymphadenopathy. Carotid pulses are normal without bruits. Thyroid is normal in midline without nodules.     Chest - lungs are clear to auscultation without rales, rhonchi or wheezes.     Heart - regular, rate and rhythm without murmurs, rubs or gallops.    Abdomen - soft, flat, nontender, nondistended. No masses, hepatomegaly or splenomegaly is noted. No rebound, rigidity or guarding is noted. Bowel sounds are normoactive.     Rectal - normal anal opening. Normal sphincter tone. COY revealed mildly enlarged 1+ prostate without nodularity. Stool in the rectal vault was brown and guaiac negative. No masses appreciated.     Extremities - no cyanosis, clubbing or edema. Pedal pulses are 2+ normal at the dorsalis pedis and posterior pulses bilaterally.     Neurological - cranial nerves II through XII are grossly intact. Motor strength 5/5 at all fours.     Lab Results   Component Value Date    ALBUMIN 4.7 08/19/2024    ALT 42 08/19/2024    AST 35 08/19/2024    BUN 17 08/19/2024    CALCIUM 10.1 08/19/2024     " 08/19/2024    CHOL 157 08/19/2024    CO2 28 08/19/2024    CREATININE 0.78 08/19/2024    GFRMALE >90 08/21/2023    GLUCOSE 99 08/19/2024    HDL 54.3 08/19/2024    HCT 47.5 08/19/2024    HGB 15.8 08/19/2024    HGBA1C 5.3 08/19/2024    K 4.5 08/19/2024    LDLF 69 08/21/2023     08/19/2024     08/19/2024    PSASCREEN 2.11 08/19/2024    TRIG 83 08/19/2024    WBC 6.5 08/19/2024     Assessment/Plan   1. Annual physical exam  ECG 12 Lead    POCT Fecal occult blood-guiac methodology screening manually resulted      2. Benign essential hypertension  valsartan (Diovan) 160 mg tablet    ECG 12 Lead      3. Mixed hyperlipidemia  rosuvastatin (Crestor) 5 mg tablet      4. Uncomplicated asthma, unspecified asthma severity, unspecified whether persistent (HHS-HCC)  fluticasone propion-salmeteroL (Advair Diskus) 500-50 mcg/dose diskus inhaler      5. Asthma, unspecified asthma severity, unspecified whether complicated, unspecified whether persistent (HHS-HCC)  albuterol 90 mcg/actuation inhaler      6. Gastroesophageal reflux disease without esophagitis  pantoprazole (ProtoNix) 40 mg EC tablet      7. Hemorrhoids, unspecified hemorrhoid type  hydrocortisone 2.5 % cream      8. Hyperglycemia        9. Abnormal EKG  Echocardiogram Stress Test          Patient to continue current medications (with any exceptions as noted) and diet. Follow-up in 12 month(s) otherwise as needed.      Patient was given refill(s) on:   Pantoprazole Sodium 40 mg, TAKE 1 TAB BY MOUTH DAILY   Valsartan 160 mg, TAKE 1 TAB BY MOUTH DAILY  Rosuvastatin Calcium 5 mg, TAKE 1 TAB BY MOUTH NIGHTLY   Albuterol Sulfate HFA 90 mcg/act, INHALE 2 PUFFS EVERY 4-6 HOURS AS NEEDED  Hydrocortisone 2.5 % cream,  APPLY SPARINGLY TO AFFECTED AREA(S) 2 TIMES A DAY   Fluticasone Propionate 50 mcg/act, INSTILL 2 SPRAYS IN EACH NOSTRIL ONE HOUR PRIOR TO BEDTIME     Rx(s) sent to pharmacy.      Encouraged patient to monitor blood pressures a home.     Ordered ECG to be  done today. If it is positive the patient will be sent for stress echo. If it is negative no follow up needed at this time.    Recommended to take FLU vaccine and RSV.    Scribe Attestation  By signing my name below, ICristal Scribe   attest that this documentation has been prepared under the direction and in the presence of Jan Darby MD.

## 2024-08-27 ENCOUNTER — TELEPHONE (OUTPATIENT)
Dept: PRIMARY CARE | Facility: CLINIC | Age: 59
End: 2024-08-27

## 2024-08-27 NOTE — TELEPHONE ENCOUNTER
Ha called in, concerned about the ECG results from yesterday. He would like Dr. Darby to contact him to go over and explain the results in more detail, as he is unsure why he needs to follow up with a cardiologist (he does have the appointment made, he's just worried). He doesn't understand why or what made the ECG classified as abnormal. Please advise.

## 2024-09-10 ENCOUNTER — APPOINTMENT (OUTPATIENT)
Dept: CARDIOLOGY | Facility: HOSPITAL | Age: 59
End: 2024-09-10
Payer: COMMERCIAL

## 2024-09-26 ENCOUNTER — HOSPITAL ENCOUNTER (OUTPATIENT)
Dept: CARDIOLOGY | Facility: HOSPITAL | Age: 59
Discharge: HOME | End: 2024-09-26
Payer: COMMERCIAL

## 2024-09-26 DIAGNOSIS — R94.31 ABNORMAL EKG: ICD-10-CM

## 2024-09-26 PROCEDURE — 93017 CV STRESS TEST TRACING ONLY: CPT

## 2024-09-26 PROCEDURE — 2500000004 HC RX 250 GENERAL PHARMACY W/ HCPCS (ALT 636 FOR OP/ED): Performed by: NURSE PRACTITIONER

## 2024-10-16 DIAGNOSIS — K64.9 HEMORRHOIDS, UNSPECIFIED HEMORRHOID TYPE: Primary | ICD-10-CM

## 2024-10-16 RX ORDER — HYDROCORTISONE 25 MG/G
CREAM TOPICAL
Qty: 28 G | Refills: 1 | Status: SHIPPED | OUTPATIENT
Start: 2024-10-16

## 2024-11-14 DIAGNOSIS — K64.9 HEMORRHOIDS, UNSPECIFIED HEMORRHOID TYPE: ICD-10-CM

## 2024-11-14 RX ORDER — HYDROCORTISONE 25 MG/G
CREAM TOPICAL
Qty: 84 G | Refills: 3 | Status: SHIPPED | OUTPATIENT
Start: 2024-11-14 | End: 2024-11-14 | Stop reason: SDUPTHER

## 2024-11-14 RX ORDER — HYDROCORTISONE 25 MG/G
CREAM TOPICAL
Qty: 84 G | Refills: 3 | Status: SHIPPED | OUTPATIENT
Start: 2024-11-14

## 2025-01-31 ENCOUNTER — OFFICE VISIT (OUTPATIENT)
Dept: PRIMARY CARE | Facility: CLINIC | Age: 60
End: 2025-01-31
Payer: COMMERCIAL

## 2025-01-31 ENCOUNTER — TELEPHONE (OUTPATIENT)
Dept: PRIMARY CARE | Facility: CLINIC | Age: 60
End: 2025-01-31

## 2025-01-31 ENCOUNTER — APPOINTMENT (OUTPATIENT)
Dept: URGENT CARE | Age: 60
End: 2025-01-31
Payer: COMMERCIAL

## 2025-01-31 VITALS
RESPIRATION RATE: 16 BRPM | TEMPERATURE: 96 F | HEART RATE: 93 BPM | DIASTOLIC BLOOD PRESSURE: 88 MMHG | OXYGEN SATURATION: 99 % | SYSTOLIC BLOOD PRESSURE: 142 MMHG | WEIGHT: 195 LBS | BODY MASS INDEX: 27.92 KG/M2 | HEIGHT: 70 IN

## 2025-01-31 DIAGNOSIS — R06.2 WHEEZING: ICD-10-CM

## 2025-01-31 DIAGNOSIS — R05.8 PRODUCTIVE COUGH: ICD-10-CM

## 2025-01-31 DIAGNOSIS — J40 BRONCHITIS: Primary | ICD-10-CM

## 2025-01-31 DIAGNOSIS — R06.09 DYSPNEA ON EXERTION: ICD-10-CM

## 2025-01-31 PROCEDURE — 99212 OFFICE O/P EST SF 10 MIN: CPT | Performed by: NURSE PRACTITIONER

## 2025-01-31 RX ORDER — AZITHROMYCIN 500 MG/1
500 TABLET, FILM COATED ORAL DAILY
Qty: 7 TABLET | Refills: 0 | Status: SHIPPED | OUTPATIENT
Start: 2025-01-31 | End: 2025-02-07

## 2025-01-31 RX ORDER — PREDNISONE 10 MG/1
TABLET ORAL
Qty: 30 TABLET | Refills: 0 | Status: SHIPPED | OUTPATIENT
Start: 2025-01-31 | End: 2025-02-10

## 2025-01-31 ASSESSMENT — PATIENT HEALTH QUESTIONNAIRE - PHQ9
1. LITTLE INTEREST OR PLEASURE IN DOING THINGS: NOT AT ALL
2. FEELING DOWN, DEPRESSED OR HOPELESS: NOT AT ALL
SUM OF ALL RESPONSES TO PHQ9 QUESTIONS 1 AND 2: 0

## 2025-01-31 NOTE — TELEPHONE ENCOUNTER
"Patient called in requesting an antibiotic and steroid pack for a sinus infection. He stated he has been sick for 1 1/2 weeks and it is ow spreading to his ears. Advised patient of C.C due to Dr. Darby being out of office on Fridays. Patient became extremely aggressive and started screaming at me stating \"I have a primary care doctor and I expect him to treat me\". Advised patient I can put in a message but I cannot guarantee a response today due to provider not being in office. Patient and his wife began yelling stating this is ridiculous and unacceptable. Call handed to [place] after patient requested to speak with someone else  Please Advise   "

## 2025-01-31 NOTE — PROGRESS NOTES
"Subjective   Patient ID: Ha Goldberg is a 59 y.o. male who presents for Sinusitis  Sinus   Symptoms: sinus pressure, headaches, ear ache right side, post nasal drip, plugged ear sensation, runny nose , congestion, cough, sputum is thick yellow and green.   Length of symptoms: 9 days ago  OTC:  mucinex expectorant with no help.  Related information:    HPI     Review of Systems    Objective   /88   Pulse 93   Temp 35.6 °C (96 °F)   Resp 16   Ht 1.778 m (5' 10\")   Wt 88.5 kg (195 lb)   SpO2 99%   BMI 27.98 kg/m²     Physical Exam    Assessment/Plan          "

## 2025-01-31 NOTE — PROGRESS NOTES
Subjective   Patient ID: Ha Goldberg is a 59 y.o. male who is with a chief complaint of productive cough.    HPI   Patient is a 59 y.o. male who CONSULTED AT [facility] today. Patient is with complaints of nasal congestion, nasal discharge, headache, sinus pain, right ear congestion, throat irritation, post nasal drip, productive cough, shortness of breath on exertion, intermittent wheezing, fatigue, muscle ache, mild loss of sense of smell, and chills. He denies having any loss of sense of taste, diarrhea, nor fever. Patient states that present condition started about 9 days ago. Patient denies history of recent travel, exposure to person/people who tested positive for COVID 19, nor exposure to person/people with flu like symptoms. he denies chest pain, palpitations, nor edema. he stated that he  tried OTC medications which afforded only slight relief of symptoms. he denies nausea, vomiting, abdominal pain, nor any other symptoms.    Patient states he had his COVID vaccine.  Patient states he had the flu shot for this season.    Review of Systems  General: no weight loss, generally healthy, (+) fatigue  Head: (+) headache, (+) sinus pain, no vertigo, no injury  Eyes: no diplopia, no tearing, no pain,   Ears: (+) right ear congestion, no tinnitus, no bleeding, no vertigo  Mouth:  no dental difficulties, no gingival bleeding, (+) throat irritation, no loss of sense of taste, (+) post nasal drip,   Nose: (+) congestion, (+) discharge, no bleeding, no obstruction, (+) mild loss of sense of smell  Neck: no stiffness, no pain, no tenderness, no masses, no bruit  Pulmonary: (+) dyspnea on exertion, (+) intermittent wheezing, no hemoptysis, (+) productive cough,  Cardiovascular: no chest pain, no palpitations, no syncope, no orthopnea  Gastrointestinal: no change in appetite, no dysphagia, no abdominal pains, no diarrhea, no emesis, no melena  Genito Urinary: no dysuria, no urinary urgency, no  "nocturia, no incontinence, no change in nature of urine  Musculoskeletal: (+) muscle ache, no joint pain, no limitation of range of motion, no paresthesia, no numbness  Constitutional: no fever, (+) chills, no night sweats    Objective   /88   Pulse 93   Temp 35.6 °C (96 °F)   Resp 16   Ht 1.778 m (5' 10\")   Wt 88.5 kg (195 lb)   SpO2 99%   BMI 27.98 kg/m²     Physical Exam  General: ambulatory, in no acute distress  Head: normocephalic, no lesions, no sinus tenderness  Eyes: pink palpebral conjunctiva, anicteric sclerae, PERRLA, EOM's full  Ears: clear external auditory canals, no ear discharge, no bleeding from the ears, tympanic membrane intact  Nose: (+) congested nasal mucosa, (+) yellow mucoid nasal discharge, no bleeding, no obstruction  Throat: (+) erythema, and (+) exudate on posterior pharyngeal wall, no lesion  Neck: supple, no masses, no bruits, no CLADP  Chest: symmetrical chest expansion, no lagging, no retractions, (+) harsh breath sounds, (+) diffuse rhonchi on both lung fields, no rales, no wheezes    Assessment/Plan   Problem List Items Addressed This Visit             ICD-10-CM    Wheezing R06.2    Relevant Medications    azithromycin (Zithromax) 500 mg tablet    predniSONE (Deltasone) 10 mg tablet     Other Visit Diagnoses         Codes    Bronchitis    -  Primary J40    Relevant Medications    azithromycin (Zithromax) 500 mg tablet    predniSONE (Deltasone) 10 mg tablet    Dyspnea on exertion     R06.09    Relevant Medications    azithromycin (Zithromax) 500 mg tablet    Productive cough     R05.8    Relevant Medications    azithromycin (Zithromax) 500 mg tablet        DISCHARGE SUMMARY:   Patient was seen and examined. Diagnosis, treatment, treatment options, and possible complications of today's illness discussed and explained to patient. Patient to take medication/s associated with this visit. Patient may take OTC decongestant of choice as needed. He may continue using his " inhaler as needed. Patient may also take OTC analgesic/ antipyretic if needed for pain/fever. Advised to increase oral fluid intake. Advised steam inhalation if needed to relieve congestion. Advised warm saline gargle if needed to relieve throat discomfort. Advised Listerine antiseptic mouthwash gargle TID. Patient may use Cepacol oral spray as needed to relieve throat discomfort. Patient was advised to discard the old toothbrush and use a new toothbrush beginning on the third of antibiotics. Advised to come back if with worsening or persistent symptoms. Patient verbalized understanding of plan of care.    Patient to come back in 7 - 10 days if needed for worsening symptoms.

## 2025-02-01 ASSESSMENT — PATIENT HEALTH QUESTIONNAIRE - PHQ9
1. LITTLE INTEREST OR PLEASURE IN DOING THINGS: NOT AT ALL
SUM OF ALL RESPONSES TO PHQ9 QUESTIONS 1 AND 2: 0
2. FEELING DOWN, DEPRESSED OR HOPELESS: NOT AT ALL

## 2025-02-01 ASSESSMENT — ENCOUNTER SYMPTOMS
OCCASIONAL FEELINGS OF UNSTEADINESS: 0
LOSS OF SENSATION IN FEET: 0
DEPRESSION: 0

## 2025-02-01 NOTE — PATIENT INSTRUCTIONS
DISCHARGE SUMMARY:   Patient was seen and examined. Diagnosis, treatment, treatment options, and possible complications of today's illness discussed and explained to patient. Patient to take medication/s associated with this visit. Patient may take OTC decongestant of choice as needed. He may continue using his inhaler as needed. Patient may also take OTC analgesic/ antipyretic if needed for pain/fever. Advised to increase oral fluid intake. Advised steam inhalation if needed to relieve congestion. Advised warm saline gargle if needed to relieve throat discomfort. Advised Listerine antiseptic mouthwash gargle TID. Patient may use Cepacol oral spray as needed to relieve throat discomfort. Patient was advised to discard the old toothbrush and use a new toothbrush beginning on the third of antibiotics. Advised to come back if with worsening or persistent symptoms. Patient verbalized understanding of plan of care.    Patient to come back in 7 - 10 days if needed for worsening symptoms.

## 2025-02-10 DIAGNOSIS — K64.9 HEMORRHOIDS, UNSPECIFIED HEMORRHOID TYPE: Primary | ICD-10-CM

## 2025-02-10 RX ORDER — HYDROCORTISONE 25 MG/G
CREAM TOPICAL
Qty: 20 G | Refills: 1 | Status: SHIPPED | OUTPATIENT
Start: 2025-02-10 | End: 2025-02-11 | Stop reason: SDUPTHER

## 2025-02-11 ENCOUNTER — TELEPHONE (OUTPATIENT)
Dept: PRIMARY CARE | Facility: CLINIC | Age: 60
End: 2025-02-11
Payer: COMMERCIAL

## 2025-02-11 DIAGNOSIS — K64.9 HEMORRHOIDS, UNSPECIFIED HEMORRHOID TYPE: ICD-10-CM

## 2025-02-11 RX ORDER — HYDROCORTISONE 25 MG/G
CREAM TOPICAL
Qty: 20 G | Refills: 1 | Status: SHIPPED | OUTPATIENT
Start: 2025-02-11

## 2025-02-11 NOTE — TELEPHONE ENCOUNTER
Rx Refill Request     Name: Ha Goldberg  :  1965   Medication Name:  HYDROCORTISONE 2.5  Specific Pharmacy location:  [facility]  Date of last appointment:  2024   Date of next appointment:  Visit date not found   Best number to reach patient:  [phone]       [facility]

## 2025-03-10 ENCOUNTER — TELEPHONE (OUTPATIENT)
Age: 60
End: 2025-03-10

## 2025-03-10 DIAGNOSIS — Z12.5 SCREENING FOR MALIGNANT NEOPLASM OF PROSTATE: ICD-10-CM

## 2025-03-10 DIAGNOSIS — Z00.00 ENCOUNTER FOR ROUTINE ADULT HEALTH EXAMINATION WITHOUT ABNORMAL FINDINGS: Primary | ICD-10-CM

## 2025-03-10 NOTE — TELEPHONE ENCOUNTER
Patient has an appt on 8/25 to establish care with Dr. Santos.    He is inquiring if the pcp would be willing to order blood work that he have done a week before the appt. He would like to make sure the results can be discussed during his visit.    Labs requested: CBC, CMP, Lipid, A1C and PSA

## 2025-06-02 DIAGNOSIS — E78.2 MIXED HYPERLIPIDEMIA: ICD-10-CM

## 2025-06-02 DIAGNOSIS — J45.909 ASTHMA, UNSPECIFIED ASTHMA SEVERITY, UNSPECIFIED WHETHER COMPLICATED, UNSPECIFIED WHETHER PERSISTENT (HHS-HCC): ICD-10-CM

## 2025-06-02 DIAGNOSIS — I10 BENIGN ESSENTIAL HYPERTENSION: ICD-10-CM

## 2025-06-02 DIAGNOSIS — K64.9 HEMORRHOIDS, UNSPECIFIED HEMORRHOID TYPE: ICD-10-CM

## 2025-06-02 DIAGNOSIS — J45.909 UNCOMPLICATED ASTHMA, UNSPECIFIED ASTHMA SEVERITY, UNSPECIFIED WHETHER PERSISTENT (HHS-HCC): ICD-10-CM

## 2025-06-02 DIAGNOSIS — K21.9 GASTROESOPHAGEAL REFLUX DISEASE WITHOUT ESOPHAGITIS: ICD-10-CM

## 2025-06-02 RX ORDER — FLUTICASONE PROPIONATE AND SALMETEROL 500; 50 UG/1; UG/1
1 POWDER RESPIRATORY (INHALATION)
Qty: 180 EACH | Refills: 3 | Status: SHIPPED | OUTPATIENT
Start: 2025-06-02

## 2025-06-02 RX ORDER — ROSUVASTATIN CALCIUM 5 MG/1
5 TABLET, COATED ORAL
Qty: 90 TABLET | Refills: 3 | Status: SHIPPED | OUTPATIENT
Start: 2025-06-02

## 2025-06-02 RX ORDER — HYDROCORTISONE 25 MG/G
CREAM TOPICAL
Qty: 20 G | Refills: 1 | Status: SHIPPED | OUTPATIENT
Start: 2025-06-02

## 2025-06-02 RX ORDER — PANTOPRAZOLE SODIUM 40 MG/1
40 TABLET, DELAYED RELEASE ORAL 2 TIMES DAILY
Qty: 180 TABLET | Refills: 3 | Status: SHIPPED | OUTPATIENT
Start: 2025-06-02

## 2025-06-02 RX ORDER — VALSARTAN 160 MG/1
160 TABLET ORAL
Qty: 90 TABLET | Refills: 3 | Status: SHIPPED | OUTPATIENT
Start: 2025-06-02

## 2025-06-02 RX ORDER — ALBUTEROL SULFATE 90 UG/1
INHALANT RESPIRATORY (INHALATION)
Qty: 54 G | Refills: 3 | Status: SHIPPED | OUTPATIENT
Start: 2025-06-02

## 2025-06-02 NOTE — TELEPHONE ENCOUNTER
Rx Refill Request Telephone Encounter    Name:  Ha Goldberg  :  [id number]  Medication Name:    albuterol 90 mcg/actuation inhaler     fluticasone propion-salmeteroL 500-50 mcg/dose diskus inhaler    pantoprazole (ProtoNix) 40 mg EC tablet     rosuvastatin (Crestor) 5 mg tablet     valsartan (Diovan) 160 mg tablet     Specific Pharmacy location:  [facility] - PT REQUESTED 90 DAY SUPPLY WITH 3 REFILLS EACH  Date of last appointment:  24 (25 W/ DELICIA DEWEY)  Date of next appointment:  25  Best number to reach patient:  [phone]

## 2025-06-02 NOTE — TELEPHONE ENCOUNTER
Rx Refill Request Telephone Encounter    Name:  Ha Goldberg  :  [id number]  Medication Name:  hydrocortisone 2.5 % cream     Specific Pharmacy location:  [facility]  Date of last appointment:  24 (25 w/ Maninder Domínguez)  Date of next appointment:  25  Best number to reach patient:  [phone]

## 2025-06-06 ENCOUNTER — APPOINTMENT (OUTPATIENT)
Dept: ORTHOPEDIC SURGERY | Facility: CLINIC | Age: 60
End: 2025-06-06
Payer: COMMERCIAL

## 2025-06-13 ENCOUNTER — APPOINTMENT (OUTPATIENT)
Dept: ORTHOPEDIC SURGERY | Facility: CLINIC | Age: 60
End: 2025-06-13
Payer: COMMERCIAL

## 2025-06-16 ENCOUNTER — APPOINTMENT (OUTPATIENT)
Dept: ORTHOPEDIC SURGERY | Facility: CLINIC | Age: 60
End: 2025-06-16
Payer: COMMERCIAL

## 2025-06-16 ENCOUNTER — HOSPITAL ENCOUNTER (OUTPATIENT)
Dept: RADIOLOGY | Facility: HOSPITAL | Age: 60
Discharge: HOME | End: 2025-06-16
Payer: COMMERCIAL

## 2025-06-16 DIAGNOSIS — M25.511 BILATERAL SHOULDER PAIN, UNSPECIFIED CHRONICITY: ICD-10-CM

## 2025-06-16 DIAGNOSIS — M19.011 ARTHRITIS OF BOTH SHOULDER REGIONS: Primary | ICD-10-CM

## 2025-06-16 DIAGNOSIS — M25.512 BILATERAL SHOULDER PAIN, UNSPECIFIED CHRONICITY: ICD-10-CM

## 2025-06-16 DIAGNOSIS — M19.012 ARTHRITIS OF BOTH SHOULDER REGIONS: Primary | ICD-10-CM

## 2025-06-16 PROCEDURE — 99214 OFFICE O/P EST MOD 30 MIN: CPT | Performed by: ORTHOPAEDIC SURGERY

## 2025-06-16 PROCEDURE — 73030 X-RAY EXAM OF SHOULDER: CPT | Mod: RIGHT SIDE | Performed by: RADIOLOGY

## 2025-06-16 PROCEDURE — 20610 DRAIN/INJ JOINT/BURSA W/O US: CPT | Performed by: ORTHOPAEDIC SURGERY

## 2025-06-16 PROCEDURE — 73030 X-RAY EXAM OF SHOULDER: CPT | Mod: RT

## 2025-06-16 PROCEDURE — 73030 X-RAY EXAM OF SHOULDER: CPT | Mod: LEFT SIDE | Performed by: RADIOLOGY

## 2025-06-16 PROCEDURE — 73030 X-RAY EXAM OF SHOULDER: CPT | Mod: LT

## 2025-06-16 RX ORDER — TRIAMCINOLONE ACETONIDE 40 MG/ML
40 INJECTION, SUSPENSION INTRA-ARTICULAR; INTRAMUSCULAR
Status: COMPLETED | OUTPATIENT
Start: 2025-06-16 | End: 2025-06-16

## 2025-06-16 RX ORDER — LIDOCAINE HYDROCHLORIDE 10 MG/ML
4 INJECTION, SOLUTION INFILTRATION; PERINEURAL
Status: COMPLETED | OUTPATIENT
Start: 2025-06-16 | End: 2025-06-16

## 2025-06-16 RX ADMIN — TRIAMCINOLONE ACETONIDE 40 MG: 40 INJECTION, SUSPENSION INTRA-ARTICULAR; INTRAMUSCULAR at 10:21

## 2025-06-16 RX ADMIN — LIDOCAINE HYDROCHLORIDE 4 ML: 10 INJECTION, SOLUTION INFILTRATION; PERINEURAL at 10:21

## 2025-06-16 NOTE — PROGRESS NOTES
History of Present Illness  Chief Complaint   Patient presents with    Left Shoulder - New Patient Visit     Xrays today    Right Shoulder - New Patient Visit     Xrays today         Patient presents for follow up regarding shoulder arthritis.  Patient reports ongoing dull, aching pain in the shoulder and pain with movement and lifting.  Patient notes pain is limiting function and activity level.  Patient has found minimal relief with anti inflammatory medications and activity modifications.    Medical History[1]    Medication Documentation Review Audit       Reviewed by Ximena Cheema MA (Medical Assistant) on 06/16/25 at 1002      Medication Order Taking? Sig Documenting Provider Last Dose Status   acetaminophen (Tylenol) 325 mg tablet [id number] No Take 2 tablets (650 mg) by mouth every 6 hours if needed (pain). Historical Provider, MD Taking Active   albuterol 90 mcg/actuation inhaler [id number]  INHALE 2 PUFFS EVERY 4-6 HOURS AS NEEDED AND AS DIRECTED. Jan Darby MD  Active   ascorbic acid, vitamin C, 100 mg chewable tablet [id number] No Chew 1 tablet (100 mg) once daily. Historical Provider, MD Taking Active   aspirin 81 mg EC tablet [id number]  Take 1 tablet (81 mg) by mouth once every day. Historical Provider, MD  Active   cetirizine (ZyrTEC) 10 mg capsule [id number] No Take 1 capsule (10 mg) by mouth once every day. Historical Provider, MD Taking Active   fluticasone (Flonase) 50 mcg/actuation nasal spray [id number] No Administer 2 sprays into affected nostril(s) once every day.   Patient not taking: Reported on 1/31/2025    Historical Provider, MD Taking Active   fluticasone propion-salmeteroL (Advair Diskus) 500-50 mcg/dose diskus inhaler [id number]  Inhale 1 puff 2 times a day. Rinse mouth with water and spit into the sink after each use. Jan Darby MD  Active   hydrocortisone 2.5 % cream [id number]  Apply as a thin film to the affected area twice a day. Jan Darby MD  Active    multivit-min/folic/vit K/lycop (MEN'S MULTIVITAMIN ORAL) [id number] No Take 1 tablet by mouth once every day. Historical Provider, MD Taking Active   naproxen sodium (Aleve) 220 mg capsule [id number] No Take 220 mg by mouth 2 times a day before meals. Renae Provider, MD Taking Active   omega 3-dha-epa-fish oil (Fish OiL) 300-1,000 mg capsule,delayed release(DR/EC) [id number] No Take 1 capsule by mouth once every day. Renae Cantrell MD Taking Active   pantoprazole (ProtoNix) 40 mg EC tablet [id number]  Take 1 tablet (40 mg) by mouth 2 times a day. Jan Darby MD  Active   rosuvastatin (Crestor) 5 mg tablet [id number]  Take 1 tablet (5 mg) by mouth once every day. Jan Darby MD  Active   triamcinolone (Kenalog) 0.1 % cream [id number] No APPLY  AND RUB  IN A THIN FILM TO AFFECTED AREAS TWICE DAILY.(AM AND PM). Renae Cantrell MD Not Taking Active   valsartan (Diovan) 160 mg tablet [id number]  Take 1 tablet (160 mg) by mouth once every day. Jan Darby MD  Active   ZINC ORAL [id number] No Take 1 tablet by mouth once every day. Zinc 100mg Historical Provider, MD Taking Active                    RX Allergies[2]    Social History     Socioeconomic History    Marital status: Single     Spouse name: Not on file    Number of children: Not on file    Years of education: Not on file    Highest education level: Not on file   Occupational History    Not on file   Tobacco Use    Smoking status: Former     Current packs/day: 1.50     Average packs/day: 1.5 packs/day for 15.0 years (22.5 ttl pk-yrs)     Types: Cigarettes     Passive exposure: Never    Smokeless tobacco: Never   Substance and Sexual Activity    Alcohol use: Never    Drug use: Never    Sexual activity: Not on file   Other Topics Concern    Not on file   Social History Narrative    Not on file     Social Drivers of Health     Financial Resource Strain: Low Risk  (8/24/2020)    [facility]    Overall Financial  Resource Strain (CARDIA)     Difficulty of Paying Living Expenses: Not hard at all   Food Insecurity: No Food Insecurity (8/24/2020)    Received from [facility]    Hunger Vital Sign     Worried About Running Out of Food in the Last Year: Never true     Ran Out of Food in the Last Year: Never true   Transportation Needs: Not on file   Physical Activity: Not on file   Stress: Not on file   Social Connections: Not on file   Intimate Partner Violence: Not on file   Housing Stability: Not on file       Surgical History[3]         Review of Systems   GENERAL: Negative for malaise, significant weight loss, fever  MUSCULOSKELETAL: see HPI  NEURO:  Negative      Exam  Right upper extremity:   Active external rotation of the right shoulder to 0 degrees  Passive internal rotation of the right shoulder to Sacrum  Forward flexion to 110 degrees  Tender to palpation about the Anterolateral Shoulder girdle  Painful range of motion with Terminal extents of limited motion.  Infraspinatus muscle appears to be intact, supraspinatus appears to be intact, subscapularis appears to be intact.  Neurovascularly intact distally     Left upper extremity:   Passive external rotation of the right shoulder to 0 degrees  Passive internal rotation of the right shoulder to Sacrum  Forward flexion to 130 degrees  Tender to palpation about the Anterolateral Shoulder girdle  Painful range of motion with Terminal extents of limited motion.  Infraspinatus muscle appears to be intact, supraspinatus appears to be intact, subscapularis appears to be intact.  Neurovascularly intact distally    Imaging:  AP, Grashey, scapular Y and axillary imaging of the right shoulder was obtained in office to evaluate for causes right shoulder pain    Imaging demonstrates advanced arthritic change about the right glenohumeral joint with substantial inferior osteophytes as well as substantial erosion of the glenoid.    , Grashey, scapular Y axillary  imaging of the left shoulder was obtained in office to evaluate for cause of the left shoulder pain    Imaging demonstrates moderate arthritic change with small to moderately sized osteophytes and near complete loss of glenohumeral joint space.      L Inj/Asp: bilateral glenohumeral on 6/16/2025 10:21 AM  Indications: pain  Details: 22 G needle, anterior approach  Medications (Right): 40 mg triamcinolone acetonide 40 mg/mL; 4 mL lidocaine 10 mg/mL (1 %)  Medications (Left): 40 mg triamcinolone acetonide 40 mg/mL; 4 mL lidocaine 10 mg/mL (1 %)  Outcome: tolerated well, no immediate complications  Procedure, treatment alternatives, risks and benefits explained, specific risks discussed. Consent was given by the patient. Patient was prepped and draped in the usual sterile fashion.               Assessment  Patient with osteoarthritis of the bilateral shoulders     Plan  Patient experiencing an acute flare of an underlying chronic condition in regards to shoulder arthritis.  Treatment options were discussed with patient and did elect to proceed with continued conservative measures to address this.  Patient's condition is likely to be chronic in nature.  This will result in intermittent flares of which ongoing treatment may be required as these acute flares present themselves.  As patient condition progresses this will continue to decrease patient's functional capacity to perform activities of daily living.  Patient underwent steroid injection and tolerated this well.  Patient was instructed to ice and gently range shoulder over the next 24 to 48 hours to allow medication to take effect and prevent local reaction.  Patient made aware that maximal effect of steroid medication takes place in 1 week's time and may last for up to several months.            [1]   Past Medical History:  Diagnosis Date    Allergic     Anxiety     Asthma     Hypertension    [2]   Allergies  Allergen Reactions    Amoxicillin Diarrhea and Other      Abdominal pain    Stomach problems    Clindamycin Diarrhea and Other     Abdominal pain   [3]   Past Surgical History:  Procedure Laterality Date    OTHER SURGICAL HISTORY  05/09/2022    Colonoscopy    OTHER SURGICAL HISTORY  05/09/2022    Carpal tunnel surgery

## 2025-06-30 ENCOUNTER — TELEPHONE (OUTPATIENT)
Dept: ORTHOPEDIC SURGERY | Facility: CLINIC | Age: 60
End: 2025-06-30
Payer: COMMERCIAL

## 2025-06-30 DIAGNOSIS — M19.011 ARTHRITIS OF BOTH SHOULDER REGIONS: ICD-10-CM

## 2025-06-30 DIAGNOSIS — M19.012 ARTHRITIS OF BOTH SHOULDER REGIONS: ICD-10-CM

## 2025-06-30 NOTE — TELEPHONE ENCOUNTER
Patient LVM stated that he got a phone call and would like to try the steroids and NSAID, for sure the steroids, but both if possible. Can be sent to [facility].

## 2025-06-30 NOTE — TELEPHONE ENCOUNTER
Patient lvm stating he was seen 2 weeks ago for B/L shoulder arthritis and was given CSI for both, he states the pain id worse since getting the injections, would like a call back to discuss other options.

## 2025-07-01 RX ORDER — DEXAMETHASONE 0.75 MG/1
0.75 TABLET ORAL 3 TIMES DAILY
Qty: 42 TABLET | Refills: 0 | Status: SHIPPED | OUTPATIENT
Start: 2025-07-01 | End: 2025-07-15

## 2025-07-11 ENCOUNTER — TELEPHONE (OUTPATIENT)
Dept: ORTHOPEDIC SURGERY | Facility: CLINIC | Age: 60
End: 2025-07-11
Payer: COMMERCIAL

## 2025-07-11 NOTE — TELEPHONE ENCOUNTER
LVM for Ha to let him know I spoke with Dr. Gurrola about his medication and that it may provide instant relief/may not depending on how it works for him. Also advised it would not be a long term medication and it's recommended he takes it the same as the meds directions states.

## 2025-07-11 NOTE — TELEPHONE ENCOUNTER
Patient left a message requesting a call back regarding a prescription Dr. Gurrola provided for him. Patient can be reached at [phone].

## 2025-07-24 NOTE — TELEPHONE ENCOUNTER
Last Visit- 8/26/2024   Next Visit- Visit date not found   Please Advise.     Subjective:       Patient ID: Andry Roque is a 52 y.o. male.    Chief Complaint: follow up    The patient location is: home  The chief complaint leading to consultation is: f/u on chronic issues    Visit type: audiovisual    Face to Face time with patient: 5-20   minutes of total time spent on the encounter, which includes face to face time and non-face to face time preparing to see the patient (eg, review of tests), Obtaining and/or reviewing separately obtained history, Documenting clinical information in the electronic or other health record, Independently interpreting results (not separately reported) and communicating results to the patient/family/caregiver, or Care coordination (not separately reported).         Each patient to whom he or she provides medical services by telemedicine is:  (1) informed of the relationship between the physician and patient and the respective role of any other health care provider with respect to management of the patient; and (2) notified that he or she may decline to receive medical services by telemedicine and may withdraw from such care at any time.    Notes:     5-10  HPI    Problem List[1]    No past medical history on file.    Past Surgical History:   Procedure Laterality Date    WISDOM TOOTH EXTRACTION      had one tooth in youth, the 2nd in his 30s.       Family History   Problem Relation Name Age of Onset    No Known Problems Mother      Heart disease Father Valerio 65        heart attack    Alcohol abuse Father Valerio     No Known Problems Brother      No Known Problems Brother      Liver disease Paternal Grandmother          alcohol    Rheumatic fever Paternal Grandfather          resulted in heart issue    Cancer Paternal Uncle Case         Prostate       Tobacco Use History[2]    Wt Readings from Last 5 Encounters:   06/26/24 96.6 kg (213 lb)   06/27/23 95.3 kg (210 lb 1.6 oz)   06/26/23 96.2 kg (212 lb 1.3 oz)       For further HPI details, see assessment and  plan.    Review of Systems   Constitutional:  Negative for activity change and unexpected weight change.   HENT:  Negative for hearing loss, rhinorrhea and trouble swallowing.    Eyes:  Negative for discharge and visual disturbance.   Respiratory:  Negative for chest tightness and wheezing.    Cardiovascular:  Negative for chest pain and palpitations.   Gastrointestinal:  Negative for blood in stool, constipation, diarrhea and vomiting.   Endocrine: Negative for polydipsia and polyuria.   Genitourinary:  Negative for difficulty urinating, hematuria and urgency.   Musculoskeletal:  Negative for arthralgias, joint swelling and neck pain.   Neurological:  Negative for weakness and headaches.   Psychiatric/Behavioral:  Negative for confusion and dysphoric mood.        Objective:      There were no vitals filed for this visit.    Physical Exam  Constitutional:       General: He is not in acute distress.     Appearance: He is not ill-appearing.   Pulmonary:      Effort: Pulmonary effort is normal. No respiratory distress.   Neurological:      General: No focal deficit present.      Mental Status: He is alert.   Psychiatric:         Mood and Affect: Mood normal.         Behavior: Behavior normal.         Assessment:       1. Annual physical exam    2. Lipoma, unspecified site    3. H/O cold sores    4. Allergic rhinitis, unspecified seasonality, unspecified trigger    5. Elevated bilirubin    6. Screening for prostate cancer        Plan:   Annual physical exam  -     CBC Auto Differential; Future; Expected date: 07/24/2025  -     Comprehensive Metabolic Panel; Future; Expected date: 07/24/2025  -     Hemoglobin A1C; Future; Expected date: 07/24/2025  -     TSH; Future; Expected date: 07/24/2025  -     Lipid Panel; Future; Expected date: 07/24/2025  -     PSA, Screening; Future; Expected date: 07/24/2025    Lipoma, unspecified site  -     US Soft Tissue Head Neck; Future; Expected date: 07/24/2025    H/O cold  sores    Allergic rhinitis, unspecified seasonality, unspecified trigger    Elevated bilirubin    Screening for prostate cancer  -     PSA, Screening; Future; Expected date: 07/24/2025    Other orders  -     valACYclovir (VALTREX) 1000 MG tablet; 2 grams twice for one day as needed for outbreak  Dispense: 30 tablet; Refill: 0      Patient presents for follow-up on chronic conditions     Reviewed recent labs   No active concern     Lipoma   No change   We will repeat ultrasound in December (roughly 1 year from previous and he is unable to do it November.)     History of cold sores   Doing well   Has Valtrex just in case but fortunately he has not needed such     Elevated bilirubin   Suspect that this is a benign constant given historical pattern and otherwise normal liver function tests.  We will continue to monitor     History of allergic rhinitis   Adequately controlled with p.r.n. allergy medications.  No change at present time     Plan to do all routine annual labs including PSA in 12 months and see each other soon after  This note was verbally dictated, please excuse any type errors.         [1] There is no problem list on file for this patient.   [2]   Social History  Tobacco Use   Smoking Status Never    Passive exposure: Never   Smokeless Tobacco Never

## 2025-08-04 ENCOUNTER — APPOINTMENT (OUTPATIENT)
Dept: ORTHOPEDIC SURGERY | Facility: CLINIC | Age: 60
End: 2025-08-04
Payer: COMMERCIAL

## 2025-08-18 ENCOUNTER — APPOINTMENT (OUTPATIENT)
Dept: PRIMARY CARE | Facility: CLINIC | Age: 60
End: 2025-08-18
Payer: COMMERCIAL

## 2025-08-18 DIAGNOSIS — Z12.5 SCREENING FOR MALIGNANT NEOPLASM OF PROSTATE: ICD-10-CM

## 2025-08-18 DIAGNOSIS — Z00.00 ENCOUNTER FOR ROUTINE ADULT HEALTH EXAMINATION WITHOUT ABNORMAL FINDINGS: ICD-10-CM

## 2025-08-18 LAB
ALBUMIN SERPL-MCNC: 4.7 G/DL (ref 3.5–4.6)
ALP SERPL-CCNC: 102 U/L (ref 35–104)
ALT SERPL-CCNC: 42 U/L (ref 0–41)
ANION GAP SERPL CALCULATED.3IONS-SCNC: 10 MEQ/L (ref 9–15)
AST SERPL-CCNC: 42 U/L (ref 0–40)
BASOPHILS # BLD: 0 K/UL (ref 0–0.2)
BASOPHILS NFR BLD: 0.3 %
BILIRUB SERPL-MCNC: 0.8 MG/DL (ref 0.2–0.7)
BUN SERPL-MCNC: 13 MG/DL (ref 8–23)
CALCIUM SERPL-MCNC: 9.7 MG/DL (ref 8.5–9.9)
CHLORIDE SERPL-SCNC: 103 MEQ/L (ref 95–107)
CHOLEST SERPL-MCNC: 166 MG/DL (ref 0–199)
CO2 SERPL-SCNC: 26 MEQ/L (ref 20–31)
CREAT SERPL-MCNC: 0.77 MG/DL (ref 0.7–1.2)
EOSINOPHIL # BLD: 0.1 K/UL (ref 0–0.7)
EOSINOPHIL NFR BLD: 2.1 %
ERYTHROCYTE [DISTWIDTH] IN BLOOD BY AUTOMATED COUNT: 12.9 % (ref 11.5–14.5)
ESTIMATED AVERAGE GLUCOSE: 105 MG/DL
GLOBULIN SER CALC-MCNC: 1.9 G/DL (ref 2.3–3.5)
GLUCOSE SERPL-MCNC: 105 MG/DL (ref 70–99)
HBA1C MFR BLD: 5.3 % (ref 4–6)
HCT VFR BLD AUTO: 44.1 % (ref 42–52)
HDLC SERPL-MCNC: 62 MG/DL (ref 40–59)
HGB BLD-MCNC: 15.1 G/DL (ref 14–18)
LDLC SERPL CALC-MCNC: 89 MG/DL (ref 0–129)
LYMPHOCYTES # BLD: 1.3 K/UL (ref 1–4.8)
LYMPHOCYTES NFR BLD: 19.2 %
MCH RBC QN AUTO: 29.1 PG (ref 27–31.3)
MCHC RBC AUTO-ENTMCNC: 34.2 % (ref 33–37)
MCV RBC AUTO: 85 FL (ref 79–92.2)
MONOCYTES # BLD: 0.5 K/UL (ref 0.2–0.8)
MONOCYTES NFR BLD: 7.8 %
NEUTROPHILS # BLD: 4.8 K/UL (ref 1.4–6.5)
NEUTS SEG NFR BLD: 70 %
PLATELET # BLD AUTO: 272 K/UL (ref 130–400)
POTASSIUM SERPL-SCNC: 4.3 MEQ/L (ref 3.4–4.9)
PROT SERPL-MCNC: 6.6 G/DL (ref 6.3–8)
PSA SERPL-MCNC: 2.2 NG/ML (ref 0–4)
RBC # BLD AUTO: 5.19 M/UL (ref 4.7–6.1)
SODIUM SERPL-SCNC: 139 MEQ/L (ref 135–144)
TRIGL SERPL-MCNC: 77 MG/DL (ref 0–150)
WBC # BLD AUTO: 6.8 K/UL (ref 4.8–10.8)

## 2025-08-22 SDOH — HEALTH STABILITY: PHYSICAL HEALTH: ON AVERAGE, HOW MANY MINUTES DO YOU ENGAGE IN EXERCISE AT THIS LEVEL?: 150+ MIN

## 2025-08-22 SDOH — HEALTH STABILITY: PHYSICAL HEALTH: ON AVERAGE, HOW MANY DAYS PER WEEK DO YOU ENGAGE IN MODERATE TO STRENUOUS EXERCISE (LIKE A BRISK WALK)?: 3 DAYS

## 2025-08-25 ENCOUNTER — OFFICE VISIT (OUTPATIENT)
Age: 60
End: 2025-08-25
Payer: COMMERCIAL

## 2025-08-25 VITALS
DIASTOLIC BLOOD PRESSURE: 88 MMHG | HEART RATE: 74 BPM | SYSTOLIC BLOOD PRESSURE: 138 MMHG | TEMPERATURE: 97.4 F | OXYGEN SATURATION: 97 % | HEIGHT: 63 IN | BODY MASS INDEX: 34.19 KG/M2

## 2025-08-25 DIAGNOSIS — E78.00 PURE HYPERCHOLESTEROLEMIA: ICD-10-CM

## 2025-08-25 DIAGNOSIS — I10 PRIMARY HYPERTENSION: ICD-10-CM

## 2025-08-25 DIAGNOSIS — Z00.00 ENCOUNTER FOR ROUTINE ADULT HEALTH EXAMINATION WITHOUT ABNORMAL FINDINGS: Primary | ICD-10-CM

## 2025-08-25 DIAGNOSIS — K21.00 GASTROESOPHAGEAL REFLUX DISEASE WITH ESOPHAGITIS WITHOUT HEMORRHAGE: ICD-10-CM

## 2025-08-25 PROCEDURE — 99386 PREV VISIT NEW AGE 40-64: CPT | Performed by: FAMILY MEDICINE

## 2025-08-25 PROCEDURE — 3079F DIAST BP 80-89 MM HG: CPT | Performed by: FAMILY MEDICINE

## 2025-08-25 PROCEDURE — 3075F SYST BP GE 130 - 139MM HG: CPT | Performed by: FAMILY MEDICINE

## 2025-08-25 RX ORDER — ROSUVASTATIN CALCIUM 5 MG/1
5 TABLET, COATED ORAL EVERY OTHER DAY
Qty: 90 TABLET | Refills: 3
Start: 2025-08-25

## 2025-08-25 RX ORDER — PANTOPRAZOLE SODIUM 40 MG/1
40 TABLET, DELAYED RELEASE ORAL 2 TIMES DAILY
Qty: 180 TABLET | Refills: 3
Start: 2025-08-25 | End: 2026-08-25

## 2025-08-25 SDOH — ECONOMIC STABILITY: FOOD INSECURITY: WITHIN THE PAST 12 MONTHS, YOU WORRIED THAT YOUR FOOD WOULD RUN OUT BEFORE YOU GOT MONEY TO BUY MORE.: NEVER TRUE

## 2025-08-25 SDOH — ECONOMIC STABILITY: FOOD INSECURITY: WITHIN THE PAST 12 MONTHS, THE FOOD YOU BOUGHT JUST DIDN'T LAST AND YOU DIDN'T HAVE MONEY TO GET MORE.: NEVER TRUE

## 2025-08-25 ASSESSMENT — PATIENT HEALTH QUESTIONNAIRE - PHQ9
6. FEELING BAD ABOUT YOURSELF - OR THAT YOU ARE A FAILURE OR HAVE LET YOURSELF OR YOUR FAMILY DOWN: NOT AT ALL
SUM OF ALL RESPONSES TO PHQ QUESTIONS 1-9: 0
7. TROUBLE CONCENTRATING ON THINGS, SUCH AS READING THE NEWSPAPER OR WATCHING TELEVISION: NOT AT ALL
3. TROUBLE FALLING OR STAYING ASLEEP: NOT AT ALL
8. MOVING OR SPEAKING SO SLOWLY THAT OTHER PEOPLE COULD HAVE NOTICED. OR THE OPPOSITE, BEING SO FIGETY OR RESTLESS THAT YOU HAVE BEEN MOVING AROUND A LOT MORE THAN USUAL: NOT AT ALL
SUM OF ALL RESPONSES TO PHQ QUESTIONS 1-9: 0
SUM OF ALL RESPONSES TO PHQ QUESTIONS 1-9: 0
1. LITTLE INTEREST OR PLEASURE IN DOING THINGS: NOT AT ALL
10. IF YOU CHECKED OFF ANY PROBLEMS, HOW DIFFICULT HAVE THESE PROBLEMS MADE IT FOR YOU TO DO YOUR WORK, TAKE CARE OF THINGS AT HOME, OR GET ALONG WITH OTHER PEOPLE: NOT DIFFICULT AT ALL
9. THOUGHTS THAT YOU WOULD BE BETTER OFF DEAD, OR OF HURTING YOURSELF: NOT AT ALL
SUM OF ALL RESPONSES TO PHQ QUESTIONS 1-9: 0
5. POOR APPETITE OR OVEREATING: NOT AT ALL
4. FEELING TIRED OR HAVING LITTLE ENERGY: NOT AT ALL
2. FEELING DOWN, DEPRESSED OR HOPELESS: NOT AT ALL

## 2025-09-15 ENCOUNTER — APPOINTMENT (OUTPATIENT)
Dept: ORTHOPEDIC SURGERY | Facility: CLINIC | Age: 60
End: 2025-09-15
Payer: COMMERCIAL

## 2025-09-22 ENCOUNTER — APPOINTMENT (OUTPATIENT)
Dept: ORTHOPEDIC SURGERY | Facility: CLINIC | Age: 60
End: 2025-09-22
Payer: COMMERCIAL

## 2025-10-13 ENCOUNTER — APPOINTMENT (OUTPATIENT)
Dept: PRIMARY CARE | Facility: CLINIC | Age: 60
End: 2025-10-13
Payer: COMMERCIAL

## (undated) DEVICE — BANDAGE COMPR W2INXL5YD WHT BGE POLY COT M E WRP WV HK AND

## (undated) DEVICE — APPLICATOR MEDICATED 26 CC SOLUTION HI LT ORNG CHLORAPREP

## (undated) DEVICE — FORCEPS BX L240CM JAW DIA2.4MM ORNG L CAP W/ NDL DISP RAD

## (undated) DEVICE — INTENDED FOR TISSUE SEPARATION, AND OTHER PROCEDURES THAT REQUIRE A SHARP SURGICAL BLADE TO PUNCTURE OR CUT.: Brand: BARD-PARKER ® CARBON RIB-BACK BLADES

## (undated) DEVICE — HAND II: Brand: MEDLINE INDUSTRIES, INC.

## (undated) DEVICE — 1010 S-DRAPE TOWEL DRAPE 10/BX: Brand: STERI-DRAPE™

## (undated) DEVICE — COVER LT HNDL BLU PLAS

## (undated) DEVICE — SPONGE,LAP,18"X18",DLX,XR,ST,5/PK,40/PK: Brand: MEDLINE

## (undated) DEVICE — TUBING, SUCTION, 1/4" X 10', STRAIGHT: Brand: MEDLINE

## (undated) DEVICE — SINGLE PORT MANIFOLD: Brand: NEPTUNE 2

## (undated) DEVICE — SPONGE GZ W4XL4IN COT 12 PLY TYP VII WVN C FLD DSGN

## (undated) DEVICE — 2000CC GUARDIAN II: Brand: GUARDIAN

## (undated) DEVICE — GLOVE ORANGE PI 7   MSG9070

## (undated) DEVICE — GLOVE ORANGE PI 7 1/2   MSG9075

## (undated) DEVICE — SUTURE NONABSORBABLE MONOFILAMENT 4-0 PS-2 18 IN BLU PROLENE 8682H

## (undated) DEVICE — SUTURE CHROMIC GUT SZ 3-0 L27IN ABSRB BRN L26MM SH 1/2 CIR G122H

## (undated) DEVICE — GOWN,AURORA,NONREINFORCED,LARGE: Brand: MEDLINE

## (undated) DEVICE — PADDING UNDERCAST W4INXL12FT RAYON POLY SYN NONADHESIVE

## (undated) DEVICE — TRAP POLYP BALEEN

## (undated) DEVICE — SET KNF FOR MINI CRPL TUNN REL SYS SFGRD 5PK

## (undated) DEVICE — TUBE SET 96 MM 64 MM H2O PERISTALTIC STD AUX CHANNEL

## (undated) DEVICE — SLEEVE CMPR SM STD CALF SCD ANEMB LF

## (undated) DEVICE — GLOVE SURG SZ 75 STD WHT LTX SYN POLYMER BEAD REINF ANTI RL

## (undated) DEVICE — TRAY PREP DRY W/ PREM GLV 2 APPL 6 SPNG 2 UNDPD 1 OVERWRAP

## (undated) DEVICE — SNARE ENDOSCP AD L240CM LOOP W10MM SHTH DIA2.4MM RND INSUL

## (undated) DEVICE — SKIN MARKER,REGULAR TIP WITH RULER: Brand: DEVON

## (undated) DEVICE — CORD,CAUTERY,BIPOLAR,STERILE: Brand: MEDLINE

## (undated) DEVICE — LABEL MED MINI W/ MARKER

## (undated) DEVICE — SMARTGOWN BREATHABLE SPECIALTY GOWN: Brand: CONVERTORS

## (undated) DEVICE — PENCIL ES L3M BTTN SWCH HOLSTER W/ BLDE ELECTRD EDGE

## (undated) DEVICE — Device: Brand: ENDO SMARTCAP

## (undated) DEVICE — COVER DUST PERIMETER HUMPREY

## (undated) DEVICE — GLOVE ORANGE PI 8   MSG9080

## (undated) DEVICE — PAD,ABDOMINAL,8"X10",ST,LF: Brand: MEDLINE

## (undated) DEVICE — ZIMMER® STERILE DISPOSABLE TOURNIQUET CUFF, DUAL PORT, SINGLE BLADDER, 18 IN. (46 CM)

## (undated) DEVICE — DRAPE,LAP,CHOLE,W/TROUGHS,STERILE: Brand: MEDLINE

## (undated) DEVICE — COTTON UNDERCAST PADDING,REGULAR FINISH: Brand: WEBRIL

## (undated) DEVICE — MEDI-VAC NON-CONDUCTIVE SUCTION TUBING: Brand: CARDINAL HEALTH

## (undated) DEVICE — ADAPTER FLSH PMP FLD MGMT GI IRRIG OFP 2 DISPOSABLE

## (undated) DEVICE — BRUSH ENDO CLN L90.5IN SHTH DIA1.7MM BRIST DIA5-7MM 2-6MM

## (undated) DEVICE — PACK,SET UP,DRAPE: Brand: MEDLINE

## (undated) DEVICE — ELECTRODE PT RET AD L9FT HI MOIST COND ADH HYDRGEL CORDED

## (undated) DEVICE — DRESSING GZ W1XL8IN COT XRFRM N ADH OVERWRAP CURAD

## (undated) DEVICE — ENDO CARRY-ON PROCEDURE KIT: Brand: ENDO CARRY-ON PROCEDURE KIT